# Patient Record
Sex: MALE | Race: WHITE | HISPANIC OR LATINO | URBAN - METROPOLITAN AREA
[De-identification: names, ages, dates, MRNs, and addresses within clinical notes are randomized per-mention and may not be internally consistent; named-entity substitution may affect disease eponyms.]

---

## 2017-10-23 ENCOUNTER — EMERGENCY (EMERGENCY)
Facility: HOSPITAL | Age: 82
LOS: 1 days | Discharge: HOME | End: 2017-10-23
Attending: EMERGENCY MEDICINE | Admitting: EMERGENCY MEDICINE
Payer: MEDICAID

## 2017-10-23 VITALS
HEART RATE: 89 BPM | SYSTOLIC BLOOD PRESSURE: 142 MMHG | DIASTOLIC BLOOD PRESSURE: 78 MMHG | RESPIRATION RATE: 18 BRPM | TEMPERATURE: 98 F | OXYGEN SATURATION: 99 %

## 2017-10-23 VITALS
WEIGHT: 139.99 LBS | TEMPERATURE: 98 F | HEIGHT: 68 IN | HEART RATE: 90 BPM | DIASTOLIC BLOOD PRESSURE: 76 MMHG | SYSTOLIC BLOOD PRESSURE: 145 MMHG | OXYGEN SATURATION: 100 % | RESPIRATION RATE: 16 BRPM

## 2017-10-23 DIAGNOSIS — S98.139A COMPLETE TRAUMATIC AMPUTATION OF ONE UNSPECIFIED LESSER TOE, INITIAL ENCOUNTER: Chronic | ICD-10-CM

## 2017-10-23 LAB
ALBUMIN SERPL ELPH-MCNC: 3.4 G/DL — SIGNIFICANT CHANGE UP (ref 3.3–5.2)
ALP SERPL-CCNC: 92 U/L — SIGNIFICANT CHANGE UP (ref 40–120)
ALT FLD-CCNC: 12 U/L — SIGNIFICANT CHANGE UP
ANION GAP SERPL CALC-SCNC: 13 MMOL/L — SIGNIFICANT CHANGE UP (ref 5–17)
AST SERPL-CCNC: 12 U/L — SIGNIFICANT CHANGE UP
BASOPHILS # BLD AUTO: 0 K/UL — SIGNIFICANT CHANGE UP (ref 0–0.2)
BASOPHILS NFR BLD AUTO: 0.1 % — SIGNIFICANT CHANGE UP (ref 0–2)
BILIRUB SERPL-MCNC: 0.1 MG/DL — LOW (ref 0.4–2)
BUN SERPL-MCNC: 27 MG/DL — HIGH (ref 8–20)
CALCIUM SERPL-MCNC: 8.4 MG/DL — LOW (ref 8.6–10.2)
CHLORIDE SERPL-SCNC: 101 MMOL/L — SIGNIFICANT CHANGE UP (ref 98–107)
CO2 SERPL-SCNC: 26 MMOL/L — SIGNIFICANT CHANGE UP (ref 22–29)
CREAT SERPL-MCNC: 2.1 MG/DL — HIGH (ref 0.5–1.3)
CRP SERPL-MCNC: 2.1 MG/DL — HIGH (ref 0–0.4)
EOSINOPHIL # BLD AUTO: 0.2 K/UL — SIGNIFICANT CHANGE UP (ref 0–0.5)
EOSINOPHIL NFR BLD AUTO: 1.7 % — SIGNIFICANT CHANGE UP (ref 0–5)
ERYTHROCYTE [SEDIMENTATION RATE] IN BLOOD: 56 MM/HR — HIGH (ref 0–20)
GLUCOSE SERPL-MCNC: 204 MG/DL — HIGH (ref 70–115)
HCT VFR BLD CALC: 30.5 % — LOW (ref 42–52)
HGB BLD-MCNC: 10 G/DL — LOW (ref 14–18)
LACTATE BLDV-MCNC: 2.1 MMOL/L — HIGH (ref 0.5–2)
LYMPHOCYTES # BLD AUTO: 16 % — LOW (ref 20–55)
LYMPHOCYTES # BLD AUTO: 2.4 K/UL — SIGNIFICANT CHANGE UP (ref 1–4.8)
MCHC RBC-ENTMCNC: 29.3 PG — SIGNIFICANT CHANGE UP (ref 27–31)
MCHC RBC-ENTMCNC: 32.8 G/DL — SIGNIFICANT CHANGE UP (ref 32–36)
MCV RBC AUTO: 89.4 FL — SIGNIFICANT CHANGE UP (ref 80–94)
MONOCYTES # BLD AUTO: 1 K/UL — HIGH (ref 0–0.8)
MONOCYTES NFR BLD AUTO: 6.8 % — SIGNIFICANT CHANGE UP (ref 3–10)
NEUTROPHILS # BLD AUTO: 11.1 K/UL — HIGH (ref 1.8–8)
NEUTROPHILS NFR BLD AUTO: 75.1 % — HIGH (ref 37–73)
PLATELET # BLD AUTO: 294 K/UL — SIGNIFICANT CHANGE UP (ref 150–400)
POTASSIUM SERPL-MCNC: 4.4 MMOL/L — SIGNIFICANT CHANGE UP (ref 3.5–5.3)
POTASSIUM SERPL-SCNC: 4.4 MMOL/L — SIGNIFICANT CHANGE UP (ref 3.5–5.3)
PROT SERPL-MCNC: 6.9 G/DL — SIGNIFICANT CHANGE UP (ref 6.6–8.7)
RBC # BLD: 3.41 M/UL — LOW (ref 4.6–6.2)
RBC # FLD: 17.1 % — HIGH (ref 11–15.6)
SODIUM SERPL-SCNC: 140 MMOL/L — SIGNIFICANT CHANGE UP (ref 135–145)
WBC # BLD: 14.8 K/UL — HIGH (ref 4.8–10.8)
WBC # FLD AUTO: 14.8 K/UL — HIGH (ref 4.8–10.8)

## 2017-10-23 PROCEDURE — 85652 RBC SED RATE AUTOMATED: CPT

## 2017-10-23 PROCEDURE — 73630 X-RAY EXAM OF FOOT: CPT

## 2017-10-23 PROCEDURE — 86140 C-REACTIVE PROTEIN: CPT

## 2017-10-23 PROCEDURE — 93925 LOWER EXTREMITY STUDY: CPT | Mod: 26

## 2017-10-23 PROCEDURE — 71046 X-RAY EXAM CHEST 2 VIEWS: CPT

## 2017-10-23 PROCEDURE — 83605 ASSAY OF LACTIC ACID: CPT

## 2017-10-23 PROCEDURE — 85027 COMPLETE CBC AUTOMATED: CPT

## 2017-10-23 PROCEDURE — 99285 EMERGENCY DEPT VISIT HI MDM: CPT

## 2017-10-23 PROCEDURE — 80053 COMPREHEN METABOLIC PANEL: CPT

## 2017-10-23 PROCEDURE — 99284 EMERGENCY DEPT VISIT MOD MDM: CPT | Mod: 25

## 2017-10-23 PROCEDURE — 73630 X-RAY EXAM OF FOOT: CPT | Mod: 26,LT

## 2017-10-23 PROCEDURE — T1013: CPT

## 2017-10-23 PROCEDURE — 36415 COLL VENOUS BLD VENIPUNCTURE: CPT

## 2017-10-23 PROCEDURE — 83690 ASSAY OF LIPASE: CPT

## 2017-10-23 PROCEDURE — 71020: CPT | Mod: 26

## 2017-10-23 PROCEDURE — 87040 BLOOD CULTURE FOR BACTERIA: CPT

## 2017-10-23 PROCEDURE — 93925 LOWER EXTREMITY STUDY: CPT

## 2017-10-23 RX ORDER — MUPIROCIN 20 MG/G
1 OINTMENT TOPICAL
Qty: 1 | Refills: 0 | OUTPATIENT
Start: 2017-10-23 | End: 2017-11-02

## 2017-10-23 RX ORDER — MUPIROCIN 20 MG/G
1 OINTMENT TOPICAL ONCE
Qty: 0 | Refills: 0 | Status: COMPLETED | OUTPATIENT
Start: 2017-10-23 | End: 2017-10-23

## 2017-10-23 RX ADMIN — MUPIROCIN 1 APPLICATION(S): 20 OINTMENT TOPICAL at 21:55

## 2017-10-23 NOTE — ED ADULT TRIAGE NOTE - CHIEF COMPLAINT QUOTE
Pt moved here from [facility] 8 days ago, and had the toes off the left foot removed.  Daughter states the wound has a "foul smell and looks very fresh"

## 2017-10-23 NOTE — ED STATDOCS - MEDICAL DECISION MAKING DETAILS
I, Reta Pate, performed the initial face to face bedside interview with this patient regarding history of present illness and determined that the patient should be seen in the main ED.  The history, was documented by the scribe in my presence and I attest to the accuracy of the documentation.

## 2017-10-23 NOTE — ED ADULT NURSE NOTE - CHIEF COMPLAINT QUOTE
Pt moved here from [facility] 8 days ago, and had the toes off the left foot removed.  Daughter states the wound has a "foul smell and looks very fresh"

## 2017-10-23 NOTE — ED ADULT NURSE NOTE - OBJECTIVE STATEMENT
Pt moved here from [place] 8 days ago, and had the all his toes amputated from his left foot.  Daughter stated that his wound has a "foul smell and looks very fresh".  pt is A/Ox3.

## 2017-10-23 NOTE — ED PROVIDER NOTE - OBJECTIVE STATEMENT
84 yo male hx of diabetes, BPH, and PVD; s/p mid-foot amputation approx 3 months ago while in [place]; healed well; recently moved to [place] to live with daughter; BIB daughter for concerns of 1 day of loose stools, and noted wound to left foot that's been draining clear liquid; +has some mild to moderate aching pain when pressure is applied while walking with walker; no fever; non radiating

## 2017-10-23 NOTE — ED STATDOCS - PROGRESS NOTE DETAILS
83 year old male, with hx of DM, HTN, and HLD, with daughter at bedside presenting to the ED for a wound check. Pt's daughter states that the pt had an amputation to his left foot approximately 2 months and 2 weeks ago. His daughter states that the pt moved from [place] to [place] 8 days ago. Pt's daughter also states that she noticed an ulcer to the amputated foot when the pt arrived from [place]. As per daughter, the pt has had a foul odor from the amputation site that she noticed yesterday. His daughter states that the pt referred to a nursing home, but did not want to go there. Pt denies having any nausea, vomiting, or pain to the area. Upon examination in the intake area, pt was found to have an ulceration to his lateral left foot with slight surrounding erythema. Will transfer to the main ED for further evaluation by another provider.

## 2017-10-23 NOTE — ED PROVIDER NOTE - PROGRESS NOTE DETAILS
no acute findings on labs or imaging today; OK for d/c; will send home with bactroban cream and outpt f/u with [facility] and podiatry

## 2017-10-23 NOTE — ED PROVIDER NOTE - SKIN, MLM
left foot with amputation at midfoot; lateral aspect of well-healed surgical wound with 1cm round ulceration with +granulation tissue

## 2017-10-28 LAB
CULTURE RESULTS: SIGNIFICANT CHANGE UP
CULTURE RESULTS: SIGNIFICANT CHANGE UP
SPECIMEN SOURCE: SIGNIFICANT CHANGE UP
SPECIMEN SOURCE: SIGNIFICANT CHANGE UP

## 2017-11-02 ENCOUNTER — INPATIENT (INPATIENT)
Facility: HOSPITAL | Age: 82
LOS: 13 days | Discharge: NURSING, REHAB OR LONG-TERM CARE | DRG: 638 | End: 2017-11-16
Attending: HOSPITALIST | Admitting: HOSPITALIST
Payer: MEDICAID

## 2017-11-02 VITALS — WEIGHT: 130.07 LBS | HEIGHT: 67 IN

## 2017-11-02 DIAGNOSIS — S98.139A COMPLETE TRAUMATIC AMPUTATION OF ONE UNSPECIFIED LESSER TOE, INITIAL ENCOUNTER: Chronic | ICD-10-CM

## 2017-11-02 LAB
ALBUMIN SERPL ELPH-MCNC: 3.2 G/DL — LOW (ref 3.3–5.2)
ALP SERPL-CCNC: 88 U/L — SIGNIFICANT CHANGE UP (ref 40–120)
ALT FLD-CCNC: 8 U/L — SIGNIFICANT CHANGE UP
ANION GAP SERPL CALC-SCNC: 14 MMOL/L — SIGNIFICANT CHANGE UP (ref 5–17)
APPEARANCE UR: CLEAR — SIGNIFICANT CHANGE UP
AST SERPL-CCNC: 14 U/L — SIGNIFICANT CHANGE UP
BASOPHILS # BLD AUTO: 0 K/UL — SIGNIFICANT CHANGE UP (ref 0–0.2)
BASOPHILS NFR BLD AUTO: 0.2 % — SIGNIFICANT CHANGE UP (ref 0–2)
BILIRUB SERPL-MCNC: 0.1 MG/DL — LOW (ref 0.4–2)
BILIRUB UR-MCNC: NEGATIVE — SIGNIFICANT CHANGE UP
BUN SERPL-MCNC: 37 MG/DL — HIGH (ref 8–20)
CALCIUM SERPL-MCNC: 8.9 MG/DL — SIGNIFICANT CHANGE UP (ref 8.6–10.2)
CHLORIDE SERPL-SCNC: 100 MMOL/L — SIGNIFICANT CHANGE UP (ref 98–107)
CO2 SERPL-SCNC: 21 MMOL/L — LOW (ref 22–29)
COLOR SPEC: SIGNIFICANT CHANGE UP
CREAT SERPL-MCNC: 2.5 MG/DL — HIGH (ref 0.5–1.3)
DIFF PNL FLD: ABNORMAL
EOSINOPHIL # BLD AUTO: 0.4 K/UL — SIGNIFICANT CHANGE UP (ref 0–0.5)
EOSINOPHIL NFR BLD AUTO: 4.2 % — SIGNIFICANT CHANGE UP (ref 0–5)
EPI CELLS # UR: SIGNIFICANT CHANGE UP
GLUCOSE SERPL-MCNC: 312 MG/DL — HIGH (ref 70–115)
GLUCOSE UR QL: 100 MG/DL
HCT VFR BLD CALC: 30.7 % — LOW (ref 42–52)
HGB BLD-MCNC: 10.1 G/DL — LOW (ref 14–18)
KETONES UR-MCNC: NEGATIVE — SIGNIFICANT CHANGE UP
LACTATE BLDV-MCNC: 1.2 MMOL/L — SIGNIFICANT CHANGE UP (ref 0.5–2)
LEUKOCYTE ESTERASE UR-ACNC: NEGATIVE — SIGNIFICANT CHANGE UP
LIDOCAIN IGE QN: 71 U/L — HIGH (ref 22–51)
LYMPHOCYTES # BLD AUTO: 2.2 K/UL — SIGNIFICANT CHANGE UP (ref 1–4.8)
LYMPHOCYTES # BLD AUTO: 25.1 % — SIGNIFICANT CHANGE UP (ref 20–55)
MCHC RBC-ENTMCNC: 29.1 PG — SIGNIFICANT CHANGE UP (ref 27–31)
MCHC RBC-ENTMCNC: 32.9 G/DL — SIGNIFICANT CHANGE UP (ref 32–36)
MCV RBC AUTO: 88.5 FL — SIGNIFICANT CHANGE UP (ref 80–94)
MONOCYTES # BLD AUTO: 0.8 K/UL — SIGNIFICANT CHANGE UP (ref 0–0.8)
MONOCYTES NFR BLD AUTO: 9.6 % — SIGNIFICANT CHANGE UP (ref 3–10)
NEUTROPHILS # BLD AUTO: 5.3 K/UL — SIGNIFICANT CHANGE UP (ref 1.8–8)
NEUTROPHILS NFR BLD AUTO: 60.6 % — SIGNIFICANT CHANGE UP (ref 37–73)
NITRITE UR-MCNC: NEGATIVE — SIGNIFICANT CHANGE UP
PH UR: 6.5 — SIGNIFICANT CHANGE UP (ref 5–8)
PLATELET # BLD AUTO: 335 K/UL — SIGNIFICANT CHANGE UP (ref 150–400)
POTASSIUM SERPL-MCNC: 4.8 MMOL/L — SIGNIFICANT CHANGE UP (ref 3.5–5.3)
POTASSIUM SERPL-SCNC: 4.8 MMOL/L — SIGNIFICANT CHANGE UP (ref 3.5–5.3)
PROT SERPL-MCNC: 7.2 G/DL — SIGNIFICANT CHANGE UP (ref 6.6–8.7)
PROT UR-MCNC: 500 MG/DL
RBC # BLD: 3.47 M/UL — LOW (ref 4.6–6.2)
RBC # FLD: 16.8 % — HIGH (ref 11–15.6)
RBC CASTS # UR COMP ASSIST: SIGNIFICANT CHANGE UP /HPF (ref 0–4)
SODIUM SERPL-SCNC: 135 MMOL/L — SIGNIFICANT CHANGE UP (ref 135–145)
SP GR SPEC: 1.01 — SIGNIFICANT CHANGE UP (ref 1.01–1.02)
TROPONIN T SERPL-MCNC: 0.03 NG/ML — SIGNIFICANT CHANGE UP (ref 0–0.06)
UROBILINOGEN FLD QL: NEGATIVE MG/DL — SIGNIFICANT CHANGE UP
WBC # BLD: 8.7 K/UL — SIGNIFICANT CHANGE UP (ref 4.8–10.8)
WBC # FLD AUTO: 8.7 K/UL — SIGNIFICANT CHANGE UP (ref 4.8–10.8)
WBC UR QL: SIGNIFICANT CHANGE UP

## 2017-11-02 PROCEDURE — 93010 ELECTROCARDIOGRAM REPORT: CPT

## 2017-11-02 PROCEDURE — 99285 EMERGENCY DEPT VISIT HI MDM: CPT

## 2017-11-02 PROCEDURE — 71010: CPT | Mod: 26

## 2017-11-02 PROCEDURE — 74176 CT ABD & PELVIS W/O CONTRAST: CPT | Mod: 26

## 2017-11-02 RX ORDER — FAMOTIDINE 10 MG/ML
20 INJECTION INTRAVENOUS ONCE
Qty: 0 | Refills: 0 | Status: COMPLETED | OUTPATIENT
Start: 2017-11-02 | End: 2017-11-02

## 2017-11-02 RX ADMIN — Medication 30 MILLILITER(S): at 22:27

## 2017-11-02 RX ADMIN — FAMOTIDINE 20 MILLIGRAM(S): 10 INJECTION INTRAVENOUS at 22:27

## 2017-11-02 NOTE — ED ADULT NURSE NOTE - OBJECTIVE STATEMENT
pt arrived in from [place], as per family pt has had abd pain with nausea for several month pt does not really have a doctor and is in from [place], has a history of diabetes pt arrived in from [place], as per family pt has had abd pain with nausea for several month pt does not really have a doctor and is in from [place], has a history of diabetes along with left foot and leg pain and left foot amputation,

## 2017-11-02 NOTE — ED PROVIDER NOTE - MUSCULOSKELETAL, MLM
Spine appears normal, range of motion is not limited, no muscle or joint tenderness. S/p amputation at left midfoot

## 2017-11-02 NOTE — CHART NOTE - NSCHARTNOTEFT_GEN_A_CORE
WILMER NOTE: WILMER requested to meet with pt and dtr Camilla for pt concerns. pt was a [place] resident until 2014 when he moved to [place] he had medicaid in [place] and then in [place]. In October pt returned to [place] to live with dtr Camilla (medicaid application filed)  and his medicaid is pending. pt has poor ambulation skills which have deteriorated further more recently (toe amputation in Sept. in [place]) pt has become weakened and bedbound. pt is unable to care for self and dtr is finding it difficult to manage as she herself is also disabled and uses a cane to ambulate. pt had been walking with RW s/p amputation but recent issues have kept pt in bed. pt dtr states pt has not been compliant with meds due to insurance issues. WILMER did discuss generic prescriptions with MD who agreed that was a partial solution. family would like help at home or some assistance with pt care. pt may require a PT consult for poss placement.  Discussed same with MD.

## 2017-11-02 NOTE — ED PROVIDER NOTE - MEDICAL DECISION MAKING DETAILS
Pt unstable on feet, with multiple falls. Unsafe for dischaerge as unabel to care for self and daughter, who is cancer pt with chonic back pain, cannot care for him either. SW to assist with placement

## 2017-11-02 NOTE — ED PROVIDER NOTE - OBJECTIVE STATEMENT
83 year old male with PMh DM, HTN, PVD s/p amputation who presents with abd pain and leg pain. Sx began yesterday as a cramping epigastric abd pain, constant, with associated diarrhea. no associated nausea, vomiting, fever, chills, chest pain, SOB, numbness, tinglign, weakness. pt also reports chronic pain to his left leg since his surgery, mildly worse than baseline.  Of note, the pt's daughter states that the pt was ambulatory up to 2 weeks ago, now bed bound, frequent falls, unable to care for self.

## 2017-11-03 DIAGNOSIS — R27.0 ATAXIA, UNSPECIFIED: ICD-10-CM

## 2017-11-03 DIAGNOSIS — R06.02 SHORTNESS OF BREATH: ICD-10-CM

## 2017-11-03 DIAGNOSIS — E11.9 TYPE 2 DIABETES MELLITUS WITHOUT COMPLICATIONS: ICD-10-CM

## 2017-11-03 DIAGNOSIS — I73.9 PERIPHERAL VASCULAR DISEASE, UNSPECIFIED: ICD-10-CM

## 2017-11-03 DIAGNOSIS — R29.6 REPEATED FALLS: ICD-10-CM

## 2017-11-03 DIAGNOSIS — N18.3 CHRONIC KIDNEY DISEASE, STAGE 3 (MODERATE): ICD-10-CM

## 2017-11-03 DIAGNOSIS — I10 ESSENTIAL (PRIMARY) HYPERTENSION: ICD-10-CM

## 2017-11-03 DIAGNOSIS — E11.8 TYPE 2 DIABETES MELLITUS WITH UNSPECIFIED COMPLICATIONS: ICD-10-CM

## 2017-11-03 DIAGNOSIS — L97.509 NON-PRESSURE CHRONIC ULCER OF OTHER PART OF UNSPECIFIED FOOT WITH UNSPECIFIED SEVERITY: ICD-10-CM

## 2017-11-03 LAB
GLUCOSE BLDC GLUCOMTR-MCNC: 162 MG/DL — HIGH (ref 70–99)
GLUCOSE BLDC GLUCOMTR-MCNC: 170 MG/DL — HIGH (ref 70–99)
GLUCOSE BLDC GLUCOMTR-MCNC: 196 MG/DL — HIGH (ref 70–99)
GLUCOSE BLDC GLUCOMTR-MCNC: 290 MG/DL — HIGH (ref 70–99)
T3 SERPL-MCNC: 86 NG/DL — SIGNIFICANT CHANGE UP (ref 80–200)
T4 AB SER-ACNC: 5.2 UG/DL — SIGNIFICANT CHANGE UP (ref 4.5–12)
TSH SERPL-MCNC: 0.42 UIU/ML — SIGNIFICANT CHANGE UP (ref 0.27–4.2)

## 2017-11-03 PROCEDURE — 99223 1ST HOSP IP/OBS HIGH 75: CPT

## 2017-11-03 PROCEDURE — 73620 X-RAY EXAM OF FOOT: CPT | Mod: 26,50

## 2017-11-03 RX ORDER — ACETAMINOPHEN 500 MG
650 TABLET ORAL EVERY 6 HOURS
Qty: 0 | Refills: 0 | Status: DISCONTINUED | OUTPATIENT
Start: 2017-11-03 | End: 2017-11-16

## 2017-11-03 RX ORDER — METOPROLOL TARTRATE 50 MG
25 TABLET ORAL
Qty: 0 | Refills: 0 | Status: DISCONTINUED | OUTPATIENT
Start: 2017-11-03 | End: 2017-11-04

## 2017-11-03 RX ORDER — INSULIN LISPRO 100/ML
VIAL (ML) SUBCUTANEOUS
Qty: 0 | Refills: 0 | Status: DISCONTINUED | OUTPATIENT
Start: 2017-11-03 | End: 2017-11-16

## 2017-11-03 RX ORDER — DEXTROSE 50 % IN WATER 50 %
25 SYRINGE (ML) INTRAVENOUS ONCE
Qty: 0 | Refills: 0 | Status: DISCONTINUED | OUTPATIENT
Start: 2017-11-03 | End: 2017-11-16

## 2017-11-03 RX ORDER — ASPIRIN/CALCIUM CARB/MAGNESIUM 324 MG
81 TABLET ORAL DAILY
Qty: 0 | Refills: 0 | Status: DISCONTINUED | OUTPATIENT
Start: 2017-11-03 | End: 2017-11-04

## 2017-11-03 RX ORDER — DEXTROSE 50 % IN WATER 50 %
1 SYRINGE (ML) INTRAVENOUS ONCE
Qty: 0 | Refills: 0 | Status: DISCONTINUED | OUTPATIENT
Start: 2017-11-03 | End: 2017-11-16

## 2017-11-03 RX ORDER — INSULIN GLARGINE 100 [IU]/ML
10 INJECTION, SOLUTION SUBCUTANEOUS EVERY MORNING
Qty: 0 | Refills: 0 | Status: DISCONTINUED | OUTPATIENT
Start: 2017-11-03 | End: 2017-11-07

## 2017-11-03 RX ORDER — ATORVASTATIN CALCIUM 80 MG/1
10 TABLET, FILM COATED ORAL AT BEDTIME
Qty: 0 | Refills: 0 | Status: DISCONTINUED | OUTPATIENT
Start: 2017-11-03 | End: 2017-11-04

## 2017-11-03 RX ORDER — TAMSULOSIN HYDROCHLORIDE 0.4 MG/1
0.4 CAPSULE ORAL AT BEDTIME
Qty: 0 | Refills: 0 | Status: DISCONTINUED | OUTPATIENT
Start: 2017-11-03 | End: 2017-11-16

## 2017-11-03 RX ORDER — AMLODIPINE BESYLATE 2.5 MG/1
5 TABLET ORAL DAILY
Qty: 0 | Refills: 0 | Status: DISCONTINUED | OUTPATIENT
Start: 2017-11-03 | End: 2017-11-03

## 2017-11-03 RX ORDER — SODIUM CHLORIDE 9 MG/ML
1000 INJECTION, SOLUTION INTRAVENOUS
Qty: 0 | Refills: 0 | Status: DISCONTINUED | OUTPATIENT
Start: 2017-11-03 | End: 2017-11-16

## 2017-11-03 RX ORDER — AMLODIPINE BESYLATE 2.5 MG/1
10 TABLET ORAL DAILY
Qty: 0 | Refills: 0 | Status: DISCONTINUED | OUTPATIENT
Start: 2017-11-03 | End: 2017-11-16

## 2017-11-03 RX ORDER — ENOXAPARIN SODIUM 100 MG/ML
30 INJECTION SUBCUTANEOUS DAILY
Qty: 0 | Refills: 0 | Status: DISCONTINUED | OUTPATIENT
Start: 2017-11-03 | End: 2017-11-16

## 2017-11-03 RX ORDER — DEXTROSE 50 % IN WATER 50 %
12.5 SYRINGE (ML) INTRAVENOUS ONCE
Qty: 0 | Refills: 0 | Status: DISCONTINUED | OUTPATIENT
Start: 2017-11-03 | End: 2017-11-16

## 2017-11-03 RX ORDER — HYDRALAZINE HCL 50 MG
10 TABLET ORAL EVERY 4 HOURS
Qty: 0 | Refills: 0 | Status: DISCONTINUED | OUTPATIENT
Start: 2017-11-03 | End: 2017-11-16

## 2017-11-03 RX ORDER — GLUCAGON INJECTION, SOLUTION 0.5 MG/.1ML
1 INJECTION, SOLUTION SUBCUTANEOUS ONCE
Qty: 0 | Refills: 0 | Status: DISCONTINUED | OUTPATIENT
Start: 2017-11-03 | End: 2017-11-16

## 2017-11-03 RX ORDER — PANTOPRAZOLE SODIUM 20 MG/1
40 TABLET, DELAYED RELEASE ORAL
Qty: 0 | Refills: 0 | Status: DISCONTINUED | OUTPATIENT
Start: 2017-11-03 | End: 2017-11-16

## 2017-11-03 RX ADMIN — Medication 3: at 12:54

## 2017-11-03 RX ADMIN — TAMSULOSIN HYDROCHLORIDE 0.4 MILLIGRAM(S): 0.4 CAPSULE ORAL at 22:30

## 2017-11-03 RX ADMIN — INSULIN GLARGINE 10 UNIT(S): 100 INJECTION, SOLUTION SUBCUTANEOUS at 08:01

## 2017-11-03 RX ADMIN — Medication 81 MILLIGRAM(S): at 11:39

## 2017-11-03 RX ADMIN — ENOXAPARIN SODIUM 30 MILLIGRAM(S): 100 INJECTION SUBCUTANEOUS at 22:30

## 2017-11-03 RX ADMIN — Medication 25 MILLIGRAM(S): at 06:08

## 2017-11-03 RX ADMIN — Medication 10 MILLIGRAM(S): at 22:32

## 2017-11-03 RX ADMIN — ATORVASTATIN CALCIUM 10 MILLIGRAM(S): 80 TABLET, FILM COATED ORAL at 22:30

## 2017-11-03 RX ADMIN — Medication 25 MILLIGRAM(S): at 17:08

## 2017-11-03 RX ADMIN — AMLODIPINE BESYLATE 5 MILLIGRAM(S): 2.5 TABLET ORAL at 06:08

## 2017-11-03 RX ADMIN — Medication 1: at 17:08

## 2017-11-03 RX ADMIN — Medication 1: at 08:01

## 2017-11-03 RX ADMIN — PANTOPRAZOLE SODIUM 40 MILLIGRAM(S): 20 TABLET, DELAYED RELEASE ORAL at 06:08

## 2017-11-03 NOTE — PROGRESS NOTE ADULT - SUBJECTIVE AND OBJECTIVE BOX
HPI: 82 y/o male with h/o DM II, HTN, PVD, CRI, pacemaker  who was recently moved from [place], started to have generalized abdominal pain and diarrhea 1 day ago. no fever. no nausea or vomiting. no blood in the stools. symptoms resolved spontaneously today. patient also c/o SOB on walking. as per ER notes, Daughter added that the patient is bed for the last 2 weeks feeling weak and had multiple falls. Labs shows Glu: 312 mg/dl (2017 01:46)    Interval: Pt seen and examined by me in ERHR. No acute overnight events as per nursing staff. Pt is in no acute distress at time of exam, sitting up in stretcher. Pt interviewed through interpeter. Pt is mildly confused but answers most questions appropriately. Only complaint offered is pain of left foot. Pt has a midfoot amputation w v  d     REVIEW OF SYSTEMS:    Patient admits left foot pain. Pt denies fever, chills, abdominal pain, nausea, vomiting, cough, shortness of breath, chest pain or palpitations    Vital Signs Last 24 Hrs  T(C): 37.2 (2017 07:13), Max: 37.2 (2017 07:13)  T(F): 98.9 (2017 07:13), Max: 98.9 (2017 07:13)  HR: 62 (2017 08:02) (60 - 73)  BP: 147/87 (2017 08:02) (147/87 - 179/78)  BP(mean): --  RR: 20 (2017 07:13) (18 - 20)  SpO2: 98% (2017 07:13) (98% - 100%)I&O's Summary  CAPILLARY BLOOD GLUCOSE    PHYSICAL EXAM:  GENERAL: NAD, well-groomed  HEENT: PERRL, +EOMI, anicteric  NECK: Supple, No JVD   CHEST/LUNG: CTA bilaterally; Normal effort  HEART: S1S2 Normal intensity, no murmurs, gallops or rubs noted  ABDOMEN: Soft, BS Normoactive, NT, ND, no HSM noted  EXTREMITIES:  left mid foot amputation, surgical incision healed, 1-2 cm diameter round ulcer to lateral proximal foot, covered eschcar. Right foot noted w medial great toe dry ulcer 1 cm diameter. LLE noted w longitudinal scar from bypass surgery, well healed.  2+ radial and 1+ DP pulses noted, no clubbing, cyanosis, or edema noted, FROM x 4.   SKIN: No rashes or lesions noted  NEURO: A&Ox3, no focal deficits noted, CN II-XII intact  PSYCH: normal mood and affect; insight/judgement appropriate  LABS:                        10.1   8.7   )-----------( 335      ( 2017 17:15 )             30.7         135  |  100  |  37.0<H>  ----------------------------<  312<H>  4.8   |  21.0<L>  |  2.50<H>    Ca    8.9      2017 17:15    TPro  7.2  /  Alb  3.2<L>  /  TBili  0.1<L>  /  DBili  x   /  AST  14  /  ALT  8   /  AlkPhos  88        Urinalysis Basic - ( 2017 18:38 )    Color: Pale Yellow / Appearance: Clear / S.010 / pH: x  Gluc: x / Ketone: Negative  / Bili: Negative / Urobili: Negative mg/dL   Blood: x / Protein: 500 mg/dL / Nitrite: Negative   Leuk Esterase: Negative / RBC: 0-2 /HPF / WBC 0-2   Sq Epi: x / Non Sq Epi: Occasional / Bacteria: x      RADIOLOGY & ADDITIONAL TESTS:  CT abd/pel 17  IMPRESSION:  No acute abdomen or pelvic pathology. Additional findings above.    CXR 17  Impression: Interstitial pulmonary edema. Pacemaker . Findings compatible   with left ventricular failure. Exclude interstitial pneumonia              MEDICATIONS:  MEDICATIONS  (STANDING):  amLODIPine   Tablet 10 milliGRAM(s) Oral daily  aspirin  chewable 81 milliGRAM(s) Oral daily  atorvastatin 10 milliGRAM(s) Oral at bedtime  dextrose 5%. 1000 milliLiter(s) (50 mL/Hr) IV Continuous <Continuous>  dextrose 50% Injectable 12.5 Gram(s) IV Push once  dextrose 50% Injectable 25 Gram(s) IV Push once  dextrose 50% Injectable 25 Gram(s) IV Push once  enoxaparin Injectable 30 milliGRAM(s) SubCutaneous daily  insulin glargine Injectable (LANTUS) 10 Unit(s) SubCutaneous every morning  insulin lispro (HumaLOG) corrective regimen sliding scale   SubCutaneous three times a day before meals  metoprolol     tartrate 25 milliGRAM(s) Oral two times a day  pantoprazole    Tablet 40 milliGRAM(s) Oral before breakfast  tamsulosin 0.4 milliGRAM(s) Oral at bedtime    MEDICATIONS  (PRN):  dextrose Gel 1 Dose(s) Oral once PRN Blood Glucose LESS THAN 70 milliGRAM(s)/deciliter  glucagon  Injectable 1 milliGRAM(s) IntraMuscular once PRN Glucose LESS THAN 70 milligrams/deciliter HPI: 82 y/o male with h/o DM II, HTN, PVD, CRI, pacemaker  who was recently moved from [place], started to have generalized abdominal pain and diarrhea 1 day ago. no fever. no nausea or vomiting. no blood in the stools. symptoms resolved spontaneously today. patient also c/o SOB on walking. as per ER notes, Daughter added that the patient is bed for the last 2 weeks feeling weak and had multiple falls. Labs shows Glu: 312 mg/dl (2017 01:46)    Interval: Pt seen and examined by me in ERHR. No acute overnight events as per nursing staff. Pt is in no acute distress at time of exam, sitting up in stretcher. Pt interviewed through  Pt is mildly confused but answers most questions appropriately. Only complaint offered is pain of left foot. Pt had a midfoot amputation four weeks ago in [place], recently moved to [place].     REVIEW OF SYSTEMS:    Patient admits left foot pain. Pt denies fever, chills, abdominal pain, nausea, vomiting, cough, shortness of breath, chest pain or palpitations    Vital Signs Last 24 Hrs  T(C): 37.2 (2017 07:13), Max: 37.2 (2017 07:13)  T(F): 98.9 (2017 07:13), Max: 98.9 (2017 07:13)  HR: 62 (2017 08:02) (60 - 73)  BP: 147/87 (2017 08:02) (147/87 - 179/78)  BP(mean): --  RR: 20 (2017 07:13) (18 - 20)  SpO2: 98% (2017 07:13) (98% - 100%)I&O's Summary  CAPILLARY BLOOD GLUCOSE    PHYSICAL EXAM:  GENERAL: NAD, well-groomed  HEENT: PERRL, +EOMI, anicteric  NECK: Supple, No JVD   CHEST/LUNG: CTA bilaterally; Normal effort  HEART: S1S2 Normal intensity, no murmurs, gallops or rubs noted  ABDOMEN: Soft, BS Normoactive, NT, ND, no HSM noted  EXTREMITIES:  left mid foot amputation, surgical incision healed, 1-2 cm diameter round ulcer to lateral proximal foot, covered eschcar. Right foot noted w medial great toe dry ulcer 1 cm diameter. LLE noted w longitudinal scar from bypass surgery, well healed.  2+ radial and 1+ DP pulses noted, no clubbing, cyanosis, or edema noted, FROM x 4.   SKIN: No rashes or lesions noted  NEURO: A&Ox3, no focal deficits noted, CN II-XII intact  PSYCH: normal mood and affect; insight/judgement appropriate  LABS:                        10.1   8.7   )-----------( 335      ( 2017 17:15 )             30.7         135  |  100  |  37.0<H>  ----------------------------<  312<H>  4.8   |  21.0<L>  |  2.50<H>    Ca    8.9      2017 17:15    TPro  7.2  /  Alb  3.2<L>  /  TBili  0.1<L>  /  DBili  x   /  AST  14  /  ALT  8   /  AlkPhos  88        Urinalysis Basic - ( 2017 18:38 )    Color: Pale Yellow / Appearance: Clear / S.010 / pH: x  Gluc: x / Ketone: Negative  / Bili: Negative / Urobili: Negative mg/dL   Blood: x / Protein: 500 mg/dL / Nitrite: Negative   Leuk Esterase: Negative / RBC: 0-2 /HPF / WBC 0-2   Sq Epi: x / Non Sq Epi: Occasional / Bacteria: x      RADIOLOGY & ADDITIONAL TESTS:  CT abd/pel 17  IMPRESSION:  No acute abdomen or pelvic pathology. Additional findings above.    CXR 17  Impression: Interstitial pulmonary edema. Pacemaker . Findings compatible   with left ventricular failure. Exclude interstitial pneumonia              MEDICATIONS:  MEDICATIONS  (STANDING):  amLODIPine   Tablet 10 milliGRAM(s) Oral daily  aspirin  chewable 81 milliGRAM(s) Oral daily  atorvastatin 10 milliGRAM(s) Oral at bedtime  dextrose 5%. 1000 milliLiter(s) (50 mL/Hr) IV Continuous <Continuous>  dextrose 50% Injectable 12.5 Gram(s) IV Push once  dextrose 50% Injectable 25 Gram(s) IV Push once  dextrose 50% Injectable 25 Gram(s) IV Push once  enoxaparin Injectable 30 milliGRAM(s) SubCutaneous daily  insulin glargine Injectable (LANTUS) 10 Unit(s) SubCutaneous every morning  insulin lispro (HumaLOG) corrective regimen sliding scale   SubCutaneous three times a day before meals  metoprolol     tartrate 25 milliGRAM(s) Oral two times a day  pantoprazole    Tablet 40 milliGRAM(s) Oral before breakfast  tamsulosin 0.4 milliGRAM(s) Oral at bedtime    MEDICATIONS  (PRN):  dextrose Gel 1 Dose(s) Oral once PRN Blood Glucose LESS THAN 70 milliGRAM(s)/deciliter  glucagon  Injectable 1 milliGRAM(s) IntraMuscular once PRN Glucose LESS THAN 70 milligrams/deciliter

## 2017-11-03 NOTE — PATIENT PROFILE ADULT. - FALL HARM RISK
coagulation(Bleeding disorder R/T clinical cond/anti-coags)/other/age(85 years old or older)/weakness hx od falls

## 2017-11-03 NOTE — H&P ADULT - PMH
CRI (chronic renal insufficiency), stage 3 (moderate)    Diabetes    HTN (hypertension)    Pacemaker    PVD (peripheral vascular disease)

## 2017-11-03 NOTE — H&P ADULT - HISTORY OF PRESENT ILLNESS
82 y/o male with h/o DM II, HTN, PVD, CRI, pacemaker  who was recently moved from [place], started to have generalized abdominal pain and diarrhea 1 day ago. no fever. no nausea or vomiting. no blood in the stools. symptoms resolved spontaneously today. patient also c/o SOB on walking. as per ER notes, Daughter added that the patient is bed for the last 2 weeks feeling weak and had multiple falls. Labs shows Glu: 312 mg/dl

## 2017-11-03 NOTE — CONSULT NOTE ADULT - ATTENDING COMMENTS
Imaging reviewed. Patient examine at bedside. Palpable pulses. Nonhealing ulcers likely related to uncontrolled diabetes.    Continue local wound care  Consult podiatry  No revascularization needed at this time

## 2017-11-04 LAB
ANION GAP SERPL CALC-SCNC: 12 MMOL/L — SIGNIFICANT CHANGE UP (ref 5–17)
BUN SERPL-MCNC: 36 MG/DL — HIGH (ref 8–20)
CALCIUM SERPL-MCNC: 8.2 MG/DL — LOW (ref 8.6–10.2)
CHLORIDE SERPL-SCNC: 103 MMOL/L — SIGNIFICANT CHANGE UP (ref 98–107)
CHOLEST SERPL-MCNC: 211 MG/DL — HIGH (ref 110–199)
CO2 SERPL-SCNC: 21 MMOL/L — LOW (ref 22–29)
CREAT SERPL-MCNC: 2.29 MG/DL — HIGH (ref 0.5–1.3)
GLUCOSE BLDC GLUCOMTR-MCNC: 158 MG/DL — HIGH (ref 70–99)
GLUCOSE BLDC GLUCOMTR-MCNC: 172 MG/DL — HIGH (ref 70–99)
GLUCOSE BLDC GLUCOMTR-MCNC: 276 MG/DL — HIGH (ref 70–99)
GLUCOSE BLDC GLUCOMTR-MCNC: 287 MG/DL — HIGH (ref 70–99)
GLUCOSE SERPL-MCNC: 164 MG/DL — HIGH (ref 70–115)
HBA1C BLD-MCNC: 7 % — HIGH (ref 4–5.6)
HCT VFR BLD CALC: 29.9 % — LOW (ref 42–52)
HDLC SERPL-MCNC: 31 MG/DL — LOW
HGB BLD-MCNC: 9.7 G/DL — LOW (ref 14–18)
LIPID PNL WITH DIRECT LDL SERPL: 144 MG/DL — SIGNIFICANT CHANGE UP
MAGNESIUM SERPL-MCNC: 1.5 MG/DL — LOW (ref 1.6–2.6)
MCHC RBC-ENTMCNC: 29.3 PG — SIGNIFICANT CHANGE UP (ref 27–31)
MCHC RBC-ENTMCNC: 32.4 G/DL — SIGNIFICANT CHANGE UP (ref 32–36)
MCV RBC AUTO: 90.3 FL — SIGNIFICANT CHANGE UP (ref 80–94)
PLATELET # BLD AUTO: 302 K/UL — SIGNIFICANT CHANGE UP (ref 150–400)
POTASSIUM SERPL-MCNC: 3.8 MMOL/L — SIGNIFICANT CHANGE UP (ref 3.5–5.3)
POTASSIUM SERPL-SCNC: 3.8 MMOL/L — SIGNIFICANT CHANGE UP (ref 3.5–5.3)
RBC # BLD: 3.31 M/UL — LOW (ref 4.6–6.2)
RBC # FLD: 16.2 % — HIGH (ref 11–15.6)
SODIUM SERPL-SCNC: 136 MMOL/L — SIGNIFICANT CHANGE UP (ref 135–145)
TOTAL CHOLESTEROL/HDL RATIO MEASUREMENT: 7 RATIO — SIGNIFICANT CHANGE UP (ref 3.4–9.6)
TRIGL SERPL-MCNC: 178 MG/DL — SIGNIFICANT CHANGE UP (ref 10–200)
WBC # BLD: 9.3 K/UL — SIGNIFICANT CHANGE UP (ref 4.8–10.8)
WBC # FLD AUTO: 9.3 K/UL — SIGNIFICANT CHANGE UP (ref 4.8–10.8)

## 2017-11-04 PROCEDURE — 99233 SBSQ HOSP IP/OBS HIGH 50: CPT

## 2017-11-04 RX ORDER — MAGNESIUM SULFATE 500 MG/ML
2 VIAL (ML) INJECTION ONCE
Qty: 0 | Refills: 0 | Status: COMPLETED | OUTPATIENT
Start: 2017-11-04 | End: 2017-11-04

## 2017-11-04 RX ORDER — ATORVASTATIN CALCIUM 80 MG/1
40 TABLET, FILM COATED ORAL AT BEDTIME
Qty: 0 | Refills: 0 | Status: DISCONTINUED | OUTPATIENT
Start: 2017-11-04 | End: 2017-11-16

## 2017-11-04 RX ORDER — ASPIRIN/CALCIUM CARB/MAGNESIUM 324 MG
325 TABLET ORAL DAILY
Qty: 0 | Refills: 0 | Status: DISCONTINUED | OUTPATIENT
Start: 2017-11-04 | End: 2017-11-16

## 2017-11-04 RX ORDER — METOPROLOL TARTRATE 50 MG
50 TABLET ORAL
Qty: 0 | Refills: 0 | Status: DISCONTINUED | OUTPATIENT
Start: 2017-11-04 | End: 2017-11-16

## 2017-11-04 RX ADMIN — Medication 650 MILLIGRAM(S): at 06:36

## 2017-11-04 RX ADMIN — ATORVASTATIN CALCIUM 40 MILLIGRAM(S): 80 TABLET, FILM COATED ORAL at 21:45

## 2017-11-04 RX ADMIN — Medication 50 GRAM(S): at 17:40

## 2017-11-04 RX ADMIN — PANTOPRAZOLE SODIUM 40 MILLIGRAM(S): 20 TABLET, DELAYED RELEASE ORAL at 06:06

## 2017-11-04 RX ADMIN — ENOXAPARIN SODIUM 30 MILLIGRAM(S): 100 INJECTION SUBCUTANEOUS at 21:46

## 2017-11-04 RX ADMIN — Medication 1: at 09:02

## 2017-11-04 RX ADMIN — INSULIN GLARGINE 10 UNIT(S): 100 INJECTION, SOLUTION SUBCUTANEOUS at 09:02

## 2017-11-04 RX ADMIN — Medication 325 MILLIGRAM(S): at 17:40

## 2017-11-04 RX ADMIN — Medication 50 MILLIGRAM(S): at 17:41

## 2017-11-04 RX ADMIN — Medication 25 MILLIGRAM(S): at 06:06

## 2017-11-04 RX ADMIN — Medication 650 MILLIGRAM(S): at 06:06

## 2017-11-04 RX ADMIN — Medication 1: at 17:41

## 2017-11-04 RX ADMIN — Medication 3: at 12:47

## 2017-11-04 RX ADMIN — TAMSULOSIN HYDROCHLORIDE 0.4 MILLIGRAM(S): 0.4 CAPSULE ORAL at 21:45

## 2017-11-04 RX ADMIN — AMLODIPINE BESYLATE 10 MILLIGRAM(S): 2.5 TABLET ORAL at 06:06

## 2017-11-04 NOTE — PROGRESS NOTE ADULT - SUBJECTIVE AND OBJECTIVE BOX
JUAN MIGUEL PARADA    [id number]    83y      Male    INTERVAL HPI/OVERNIGHT EVENTS:    patient being seen for abd pain, ckd, dm2 and pvd. Patient seen at bedside and complains of cough.     REVIEW OF SYSTEMS:    CONSTITUTIONAL: No fever, weight loss, or fatigue  RESPIRATORY: No cough, wheezing, hemoptysis; No shortness of breath  CARDIOVASCULAR: No chest pain, palpitations  GASTROINTESTINAL: No abdominal or epigastric pain. No nausea, vomiting  NEUROLOGICAL: No headaches, memory loss, loss of strength.  MISCELLANEOUS:      Vital Signs Last 24 Hrs  T(C): 37.3 (2017 06:05), Max: 37.3 (2017 06:05)  T(F): 99.2 (2017 06:05), Max: 99.2 (2017 06:05)  HR: 62 (2017 06:05) (60 - 62)  BP: 152/60 (2017 06:05) (152/60 - 164/66)  BP(mean): --  RR: 16 (2017 06:05) (16 - 16)  SpO2: --    PHYSICAL EXAM:    GENERAL: NAD, well-groomed  HEENT: PERRL, +EOMI  NECK: soft, Supple, No JVD,   CHEST/LUNG: Clear to auscultation bilaterally; No wheezing  HEART: S1S2+, Regular rate and rhythm; No murmurs, rubs, or gallops  ABDOMEN: Soft, Nontender, Nondistended; Bowel sounds present  EXTREMITIES:  2+ Peripheral Pulses, No clubbing, cyanosis, or edema  SKIN: No rashes or lesions  NEURO: AAOX3, no focal deficits, no motor r sensory loss  PSYCH: normal mood      LABS:                        9.7    9.3   )-----------( 302      ( 2017 07:11 )             29.9     11-04    136  |  103  |  36.0<H>  ----------------------------<  164<H>  3.8   |  21.0<L>  |  2.29<H>    Ca    8.2<L>      2017 07:11  Mg     1.5     11-04    TPro  7.2  /  Alb  3.2<L>  /  TBili  0.1<L>  /  DBili  x   /  AST  14  /  ALT  8   /  AlkPhos  88  11-02      Urinalysis Basic - ( 2017 18:38 )    Color: Pale Yellow / Appearance: Clear / S.010 / pH: x  Gluc: x / Ketone: Negative  / Bili: Negative / Urobili: Negative mg/dL   Blood: x / Protein: 500 mg/dL / Nitrite: Negative   Leuk Esterase: Negative / RBC: 0-2 /HPF / WBC 0-2   Sq Epi: x / Non Sq Epi: Occasional / Bacteria: x          MEDICATIONS  (STANDING):  amLODIPine   Tablet 10 milliGRAM(s) Oral daily  aspirin 325 milliGRAM(s) Oral daily  atorvastatin 40 milliGRAM(s) Oral at bedtime  dextrose 5%. 1000 milliLiter(s) (50 mL/Hr) IV Continuous <Continuous>  dextrose 50% Injectable 12.5 Gram(s) IV Push once  dextrose 50% Injectable 25 Gram(s) IV Push once  dextrose 50% Injectable 25 Gram(s) IV Push once  enoxaparin Injectable 30 milliGRAM(s) SubCutaneous daily  HYDROcodone/homatropine Syrup 5 milliLiter(s) Oral three times a day  insulin glargine Injectable (LANTUS) 10 Unit(s) SubCutaneous every morning  insulin lispro (HumaLOG) corrective regimen sliding scale   SubCutaneous three times a day before meals  magnesium sulfate  IVPB 2 Gram(s) IV Intermittent once  metoprolol     tartrate 50 milliGRAM(s) Oral two times a day  pantoprazole    Tablet 40 milliGRAM(s) Oral before breakfast  tamsulosin 0.4 milliGRAM(s) Oral at bedtime    MEDICATIONS  (PRN):  acetaminophen   Tablet. 650 milliGRAM(s) Oral every 6 hours PRN Mild Pain (1 - 3)  dextrose Gel 1 Dose(s) Oral once PRN Blood Glucose LESS THAN 70 milliGRAM(s)/deciliter  glucagon  Injectable 1 milliGRAM(s) IntraMuscular once PRN Glucose LESS THAN 70 milligrams/deciliter  hydrALAZINE Injectable 10 milliGRAM(s) IV Push every 4 hours PRN forsbp above 160 mmhg      RADIOLOGY & ADDITIONAL TESTS: JUAN MIGUEL PARADA    [id number]    83y      Male    INTERVAL HPI/OVERNIGHT EVENTS:    patient being seen for abd pain, ckd, dm2 and pvd. Patient seen at bedside and complains of cough.     REVIEW OF SYSTEMS:    CONSTITUTIONAL: No fever, weight loss, or fatigue  RESPIRATORY: cough   CARDIOVASCULAR: No chest pain, palpitations  GASTROINTESTINAL: No abdominal or epigastric pain. No nausea, vomiting  NEUROLOGICAL: No headaches, memory loss, loss of strength.  MISCELLANEOUS:      Vital Signs Last 24 Hrs  T(C): 37.3 (2017 06:05), Max: 37.3 (2017 06:05)  T(F): 99.2 (2017 06:05), Max: 99.2 (2017 06:05)  HR: 62 (2017 06:05) (60 - 62)  BP: 152/60 (2017 06:05) (152/60 - 164/66)  BP(mean): --  RR: 16 (2017 06:05) (16 - 16)  SpO2: --    PHYSICAL EXAM:    GENERAL: NAD, pleasant  HEENT: PERRL, +EOMI  NECK: soft, Supple, No JVD,   CHEST/LUNG: Clear to auscultation bilaterally; No wheezing  HEART: S1S2+, Regular rate and rhythm; No murmurs  ABDOMEN: Soft, Nontender,  EXTREMITIES:  left tma, multiple old scars from prior surgeries  NEURO: AAOX3,   PSYCH: normal mood      LABS:                        9.7    9.3   )-----------( 302      ( 2017 07:11 )             29.9     11-04    136  |  103  |  36.0<H>  ----------------------------<  164<H>  3.8   |  21.0<L>  |  2.29<H>    Ca    8.2<L>      2017 07:11  Mg     1.5     11-04    TPro  7.2  /  Alb  3.2<L>  /  TBili  0.1<L>  /  DBili  x   /  AST  14  /  ALT  8   /  AlkPhos  88  11-02      Urinalysis Basic - ( 2017 18:38 )    Color: Pale Yellow / Appearance: Clear / S.010 / pH: x  Gluc: x / Ketone: Negative  / Bili: Negative / Urobili: Negative mg/dL   Blood: x / Protein: 500 mg/dL / Nitrite: Negative   Leuk Esterase: Negative / RBC: 0-2 /HPF / WBC 0-2   Sq Epi: x / Non Sq Epi: Occasional / Bacteria: x          MEDICATIONS  (STANDING):  amLODIPine   Tablet 10 milliGRAM(s) Oral daily  aspirin 325 milliGRAM(s) Oral daily  atorvastatin 40 milliGRAM(s) Oral at bedtime  dextrose 5%. 1000 milliLiter(s) (50 mL/Hr) IV Continuous <Continuous>  dextrose 50% Injectable 12.5 Gram(s) IV Push once  dextrose 50% Injectable 25 Gram(s) IV Push once  dextrose 50% Injectable 25 Gram(s) IV Push once  enoxaparin Injectable 30 milliGRAM(s) SubCutaneous daily  HYDROcodone/homatropine Syrup 5 milliLiter(s) Oral three times a day  insulin glargine Injectable (LANTUS) 10 Unit(s) SubCutaneous every morning  insulin lispro (HumaLOG) corrective regimen sliding scale   SubCutaneous three times a day before meals  magnesium sulfate  IVPB 2 Gram(s) IV Intermittent once  metoprolol     tartrate 50 milliGRAM(s) Oral two times a day  pantoprazole    Tablet 40 milliGRAM(s) Oral before breakfast  tamsulosin 0.4 milliGRAM(s) Oral at bedtime    MEDICATIONS  (PRN):  acetaminophen   Tablet. 650 milliGRAM(s) Oral every 6 hours PRN Mild Pain (1 - 3)  dextrose Gel 1 Dose(s) Oral once PRN Blood Glucose LESS THAN 70 milliGRAM(s)/deciliter  glucagon  Injectable 1 milliGRAM(s) IntraMuscular once PRN Glucose LESS THAN 70 milligrams/deciliter  hydrALAZINE Injectable 10 milliGRAM(s) IV Push every 4 hours PRN forsbp above 160 mmhg      RADIOLOGY & ADDITIONAL TESTS:

## 2017-11-04 NOTE — PROGRESS NOTE ADULT - ASSESSMENT
84 y/o male with uncontrolled DM, HTN, CRI, PVD, pacemaker, multiple falls admitted for abd pain      Problem/Plan - 1:  ·  Problem: Multiple falls.   --> pt consult   --> may  need sandy     Problem/Plan - 2:  ·  Problem: Type 2 diabetes mellitus with complication, without long-term current use of insulin.  Plan: insulin therapy protocol.   --> a1c 7.0     Problem/Plan - 3:  ·  Problem: Essential hypertension.  Plan: Amlodipine and metoprolol.  --> inc metoprolol       Problem/Plan - 4:  ·  Problem: CRI (chronic renal insufficiency), stage 3 (moderate).  Plan: will avoid metformin and ACEI.   --> secondary to diabetes     Problem/Plan - 5:  ·  Problem: PVD (peripheral vascular disease).  Plan: aspirin and atorvastatin.   --> inc lipitor dose     Problem/Plan - 6:  Problem: SOB (shortness of breath) on exertion. Plan: Echo cardiogram pending     7) cough --> hycodan    8) hypomagnesemia --> replete    9) dvt prop --> lovenox sub q

## 2017-11-05 LAB
ANION GAP SERPL CALC-SCNC: 13 MMOL/L — SIGNIFICANT CHANGE UP (ref 5–17)
APPEARANCE UR: CLEAR — SIGNIFICANT CHANGE UP
BILIRUB UR-MCNC: NEGATIVE — SIGNIFICANT CHANGE UP
BUN SERPL-MCNC: 44 MG/DL — HIGH (ref 8–20)
CALCIUM SERPL-MCNC: 8.4 MG/DL — LOW (ref 8.6–10.2)
CHLORIDE SERPL-SCNC: 102 MMOL/L — SIGNIFICANT CHANGE UP (ref 98–107)
CO2 SERPL-SCNC: 21 MMOL/L — LOW (ref 22–29)
COLOR SPEC: YELLOW — SIGNIFICANT CHANGE UP
CREAT SERPL-MCNC: 2.64 MG/DL — HIGH (ref 0.5–1.3)
DIFF PNL FLD: ABNORMAL
EPI CELLS # UR: SIGNIFICANT CHANGE UP
GLUCOSE BLDC GLUCOMTR-MCNC: 152 MG/DL — HIGH (ref 70–99)
GLUCOSE BLDC GLUCOMTR-MCNC: 161 MG/DL — HIGH (ref 70–99)
GLUCOSE BLDC GLUCOMTR-MCNC: 161 MG/DL — HIGH (ref 70–99)
GLUCOSE BLDC GLUCOMTR-MCNC: 164 MG/DL — HIGH (ref 70–99)
GLUCOSE SERPL-MCNC: 145 MG/DL — HIGH (ref 70–115)
GLUCOSE UR QL: 100 MG/DL
HCT VFR BLD CALC: 29.7 % — LOW (ref 42–52)
HGB BLD-MCNC: 9.7 G/DL — LOW (ref 14–18)
KETONES UR-MCNC: NEGATIVE — SIGNIFICANT CHANGE UP
LEUKOCYTE ESTERASE UR-ACNC: ABNORMAL
MAGNESIUM SERPL-MCNC: 2.1 MG/DL — SIGNIFICANT CHANGE UP (ref 1.6–2.6)
MCHC RBC-ENTMCNC: 29.5 PG — SIGNIFICANT CHANGE UP (ref 27–31)
MCHC RBC-ENTMCNC: 32.7 G/DL — SIGNIFICANT CHANGE UP (ref 32–36)
MCV RBC AUTO: 90.3 FL — SIGNIFICANT CHANGE UP (ref 80–94)
NITRITE UR-MCNC: NEGATIVE — SIGNIFICANT CHANGE UP
PH UR: 6 — SIGNIFICANT CHANGE UP (ref 5–8)
PLATELET # BLD AUTO: 324 K/UL — SIGNIFICANT CHANGE UP (ref 150–400)
POTASSIUM SERPL-MCNC: 4.1 MMOL/L — SIGNIFICANT CHANGE UP (ref 3.5–5.3)
POTASSIUM SERPL-SCNC: 4.1 MMOL/L — SIGNIFICANT CHANGE UP (ref 3.5–5.3)
PROT UR-MCNC: 500 MG/DL
RBC # BLD: 3.29 M/UL — LOW (ref 4.6–6.2)
RBC # FLD: 16.3 % — HIGH (ref 11–15.6)
RBC CASTS # UR COMP ASSIST: ABNORMAL /HPF (ref 0–4)
SODIUM SERPL-SCNC: 136 MMOL/L — SIGNIFICANT CHANGE UP (ref 135–145)
SP GR SPEC: 1.01 — SIGNIFICANT CHANGE UP (ref 1.01–1.02)
UROBILINOGEN FLD QL: NEGATIVE MG/DL — SIGNIFICANT CHANGE UP
VIT B12 SERPL-MCNC: 671 PG/ML — SIGNIFICANT CHANGE UP (ref 180–914)
WBC # BLD: 10.7 K/UL — SIGNIFICANT CHANGE UP (ref 4.8–10.8)
WBC # FLD AUTO: 10.7 K/UL — SIGNIFICANT CHANGE UP (ref 4.8–10.8)
WBC UR QL: ABNORMAL

## 2017-11-05 PROCEDURE — 99233 SBSQ HOSP IP/OBS HIGH 50: CPT

## 2017-11-05 RX ORDER — AZTREONAM 2 G
VIAL (EA) INJECTION
Qty: 0 | Refills: 0 | Status: DISCONTINUED | OUTPATIENT
Start: 2017-11-05 | End: 2017-11-06

## 2017-11-05 RX ORDER — AZTREONAM 2 G
1000 VIAL (EA) INJECTION ONCE
Qty: 0 | Refills: 0 | Status: COMPLETED | OUTPATIENT
Start: 2017-11-05 | End: 2017-11-05

## 2017-11-05 RX ADMIN — ATORVASTATIN CALCIUM 40 MILLIGRAM(S): 80 TABLET, FILM COATED ORAL at 21:50

## 2017-11-05 RX ADMIN — AMLODIPINE BESYLATE 10 MILLIGRAM(S): 2.5 TABLET ORAL at 06:22

## 2017-11-05 RX ADMIN — Medication 50 MILLIGRAM(S): at 21:49

## 2017-11-05 RX ADMIN — TAMSULOSIN HYDROCHLORIDE 0.4 MILLIGRAM(S): 0.4 CAPSULE ORAL at 21:50

## 2017-11-05 RX ADMIN — Medication 50 MILLIGRAM(S): at 06:22

## 2017-11-05 RX ADMIN — PANTOPRAZOLE SODIUM 40 MILLIGRAM(S): 20 TABLET, DELAYED RELEASE ORAL at 06:22

## 2017-11-05 RX ADMIN — Medication 325 MILLIGRAM(S): at 11:11

## 2017-11-05 RX ADMIN — Medication 50 MILLIGRAM(S): at 17:10

## 2017-11-05 RX ADMIN — Medication 1: at 17:09

## 2017-11-05 RX ADMIN — INSULIN GLARGINE 10 UNIT(S): 100 INJECTION, SOLUTION SUBCUTANEOUS at 08:33

## 2017-11-05 RX ADMIN — ENOXAPARIN SODIUM 30 MILLIGRAM(S): 100 INJECTION SUBCUTANEOUS at 21:49

## 2017-11-05 RX ADMIN — Medication 1: at 08:33

## 2017-11-05 NOTE — PROGRESS NOTE ADULT - ASSESSMENT
82 y/o male with uncontrolled DM, HTN, CRI, PVD, pacemaker, multiple falls admitted for abd pain      Problem/Plan - 1:  ·  Problem: Multiple falls.   --> pt consult appreciated  --> may  need sandy     Problem/Plan - 2:  ·  Problem: Type 2 diabetes mellitus with complication, without long-term current use of insulin.  Plan: insulin therapy protocol.   --> a1c 7.0     Problem/Plan - 3:  ·  Problem: Essential hypertension.  Plan: Amlodipine and metoprolol.     Problem/Plan - 4:  ·  Problem: CRI (chronic renal insufficiency), stage 3 (moderate).  Plan: will avoid metformin and ACEI.   --> secondary to diabetes     Problem/Plan - 5:  ·  Problem: PVD (peripheral vascular disease).  Plan: aspirin and atorvastatin.     dispo --> patient needs insurance and placement  had long conversation with family and needs medications including insulin and needs diabetic teaching    transfer to medical floor

## 2017-11-05 NOTE — PHYSICAL THERAPY INITIAL EVALUATION ADULT - ADDITIONAL COMMENTS
Pt. is a poor historian. As per daughter Camilla at bedside, pt. lives with her and her spouse in a house with 20 CARL[address] and one rail. Pt. has no stairs inside. Daughter reports pt. was amb modified independent with a SAC in March, but has been declining since and is only able to amb short distances with assist at home. Daughter reports she is on a medical leave from work but is unable to assist and no body else is available at home. Also reports pt. spends most of his time in bed and occasionally is up to use the bathroom or sit andressa  chair/ w/c.

## 2017-11-05 NOTE — PHYSICAL THERAPY INITIAL EVALUATION ADULT - CRITERIA FOR SKILLED THERAPEUTIC INTERVENTIONS
anticipated equipment needs at discharge/impairments found/anticipated discharge recommendation/functional limitations in following categories/therapy frequency/risk reduction/prevention

## 2017-11-05 NOTE — PROGRESS NOTE ADULT - SUBJECTIVE AND OBJECTIVE BOX
JUAN MIGUEL PARADA    [id number]    83y      Male    INTERVAL HPI/OVERNIGHT EVENTS:    patient being seen for abd pain, ckd, dm2 and pvd. patient seen at bedside with family.       REVIEW OF SYSTEMS:    CONSTITUTIONAL: No fever, weight loss, or fatigue  RESPIRATORY: No cough, wheezing, hemoptysis; No shortness of breath  CARDIOVASCULAR: No chest pain, palpitations  GASTROINTESTINAL: No abdominal or epigastric pain. No nausea, vomiting  NEUROLOGICAL: No headaches, memory loss, loss of strength.  MISCELLANEOUS:      Vital Signs Last 24 Hrs  T(C): 36.5 (2017 10:58), Max: 37.2 (2017 15:40)  T(F): 97.7 (2017 10:58), Max: 99 (2017 15:40)  HR: 100 (2017 10:58) (62 - 100)  BP: 157/70 (2017 10:58) (134/65 - 157/70)  BP(mean): --  RR: 16 (2017 10:58) (16 - 18)  SpO2: 96% (2017 10:58) (96% - 96%)    PHYSICAL EXAM:    GENERAL: NAD, pleasant  HEENT: PERRL, +EOMI  NECK: soft, Supple, No JVD,   CHEST/LUNG: Clear to auscultation bilaterally;  HEART: S1S2+, Regular rate and rhythm; No murmurs  ABDOMEN: Soft, Nontender,  EXTREMITIES:  left tma, multiple old scars from prior surgeries  NEURO: AAOX3,   PSYCH: normal mood      LABS:                        9.7    10.7  )-----------( 324      ( 2017 06:19 )             29.7     11-05    136  |  102  |  44.0<H>  ----------------------------<  145<H>  4.1   |  21.0<L>  |  2.64<H>    Ca    8.4<L>      2017 06:19  Mg     2.1     11-05        Urinalysis Basic - ( 2017 00:28 )    Color: Yellow / Appearance: Clear / S.015 / pH: x  Gluc: x / Ketone: Negative  / Bili: Negative / Urobili: Negative mg/dL   Blood: x / Protein: 500 mg/dL / Nitrite: Negative   Leuk Esterase: Small / RBC: 3-5 /HPF / WBC 6-10   Sq Epi: x / Non Sq Epi: Few / Bacteria: x          MEDICATIONS  (STANDING):  amLODIPine   Tablet 10 milliGRAM(s) Oral daily  aspirin 325 milliGRAM(s) Oral daily  atorvastatin 40 milliGRAM(s) Oral at bedtime  dextrose 5%. 1000 milliLiter(s) (50 mL/Hr) IV Continuous <Continuous>  dextrose 50% Injectable 12.5 Gram(s) IV Push once  dextrose 50% Injectable 25 Gram(s) IV Push once  dextrose 50% Injectable 25 Gram(s) IV Push once  enoxaparin Injectable 30 milliGRAM(s) SubCutaneous daily  insulin glargine Injectable (LANTUS) 10 Unit(s) SubCutaneous every morning  insulin lispro (HumaLOG) corrective regimen sliding scale   SubCutaneous three times a day before meals  metoprolol     tartrate 50 milliGRAM(s) Oral two times a day  pantoprazole    Tablet 40 milliGRAM(s) Oral before breakfast  tamsulosin 0.4 milliGRAM(s) Oral at bedtime    MEDICATIONS  (PRN):  acetaminophen   Tablet. 650 milliGRAM(s) Oral every 6 hours PRN Mild Pain (1 - 3)  dextrose Gel 1 Dose(s) Oral once PRN Blood Glucose LESS THAN 70 milliGRAM(s)/deciliter  glucagon  Injectable 1 milliGRAM(s) IntraMuscular once PRN Glucose LESS THAN 70 milligrams/deciliter  hydrALAZINE Injectable 10 milliGRAM(s) IV Push every 4 hours PRN forsbp above 160 mmhg      RADIOLOGY & ADDITIONAL TESTS:

## 2017-11-05 NOTE — PHYSICAL THERAPY INITIAL EVALUATION ADULT - IMPAIRMENTS CONTRIBUTING TO GAIT DEVIATIONS, PT EVAL
cognition/impaired balance/decreased strength/pt. reporting dizziness during mab., limiting functional assessment. Subsided in supine

## 2017-11-06 ENCOUNTER — TRANSCRIPTION ENCOUNTER (OUTPATIENT)
Age: 82
End: 2017-11-06

## 2017-11-06 LAB
ANION GAP SERPL CALC-SCNC: 16 MMOL/L — SIGNIFICANT CHANGE UP (ref 5–17)
BUN SERPL-MCNC: 51 MG/DL — HIGH (ref 8–20)
CALCIUM SERPL-MCNC: 8.5 MG/DL — LOW (ref 8.6–10.2)
CHLORIDE SERPL-SCNC: 102 MMOL/L — SIGNIFICANT CHANGE UP (ref 98–107)
CO2 SERPL-SCNC: 19 MMOL/L — LOW (ref 22–29)
CREAT SERPL-MCNC: 2.4 MG/DL — HIGH (ref 0.5–1.3)
GLUCOSE BLDC GLUCOMTR-MCNC: 108 MG/DL — HIGH (ref 70–99)
GLUCOSE BLDC GLUCOMTR-MCNC: 122 MG/DL — HIGH (ref 70–99)
GLUCOSE BLDC GLUCOMTR-MCNC: 148 MG/DL — HIGH (ref 70–99)
GLUCOSE BLDC GLUCOMTR-MCNC: 208 MG/DL — HIGH (ref 70–99)
GLUCOSE SERPL-MCNC: 132 MG/DL — HIGH (ref 70–115)
HCT VFR BLD CALC: 30.2 % — LOW (ref 42–52)
HGB BLD-MCNC: 9.9 G/DL — LOW (ref 14–18)
MCHC RBC-ENTMCNC: 29.9 PG — SIGNIFICANT CHANGE UP (ref 27–31)
MCHC RBC-ENTMCNC: 32.8 G/DL — SIGNIFICANT CHANGE UP (ref 32–36)
MCV RBC AUTO: 91.2 FL — SIGNIFICANT CHANGE UP (ref 80–94)
PLATELET # BLD AUTO: 318 K/UL — SIGNIFICANT CHANGE UP (ref 150–400)
POTASSIUM SERPL-MCNC: 4.4 MMOL/L — SIGNIFICANT CHANGE UP (ref 3.5–5.3)
POTASSIUM SERPL-SCNC: 4.4 MMOL/L — SIGNIFICANT CHANGE UP (ref 3.5–5.3)
RBC # BLD: 3.31 M/UL — LOW (ref 4.6–6.2)
RBC # FLD: 16.2 % — HIGH (ref 11–15.6)
SODIUM SERPL-SCNC: 137 MMOL/L — SIGNIFICANT CHANGE UP (ref 135–145)
WBC # BLD: 11.4 K/UL — HIGH (ref 4.8–10.8)
WBC # FLD AUTO: 11.4 K/UL — HIGH (ref 4.8–10.8)

## 2017-11-06 RX ORDER — ASPIRIN/CALCIUM CARB/MAGNESIUM 324 MG
1 TABLET ORAL
Qty: 0 | Refills: 0 | COMMUNITY
Start: 2017-11-06

## 2017-11-06 RX ORDER — AMLODIPINE BESYLATE 2.5 MG/1
1 TABLET ORAL
Qty: 0 | Refills: 0 | COMMUNITY
Start: 2017-11-06

## 2017-11-06 RX ORDER — INSULIN GLARGINE 100 [IU]/ML
10 INJECTION, SOLUTION SUBCUTANEOUS
Qty: 0 | Refills: 0 | COMMUNITY
Start: 2017-11-06

## 2017-11-06 RX ORDER — CEFTRIAXONE 500 MG/1
INJECTION, POWDER, FOR SOLUTION INTRAMUSCULAR; INTRAVENOUS
Qty: 0 | Refills: 0 | Status: DISCONTINUED | OUTPATIENT
Start: 2017-11-06 | End: 2017-11-08

## 2017-11-06 RX ORDER — PANTOPRAZOLE SODIUM 20 MG/1
1 TABLET, DELAYED RELEASE ORAL
Qty: 0 | Refills: 0 | COMMUNITY
Start: 2017-11-06

## 2017-11-06 RX ORDER — CEFTRIAXONE 500 MG/1
1 INJECTION, POWDER, FOR SOLUTION INTRAMUSCULAR; INTRAVENOUS ONCE
Qty: 0 | Refills: 0 | Status: COMPLETED | OUTPATIENT
Start: 2017-11-06 | End: 2017-11-06

## 2017-11-06 RX ORDER — INSULIN GLARGINE 100 [IU]/ML
5 INJECTION, SOLUTION SUBCUTANEOUS
Qty: 0 | Refills: 0 | COMMUNITY
Start: 2017-11-06

## 2017-11-06 RX ORDER — INSULIN GLARGINE 100 [IU]/ML
12 INJECTION, SOLUTION SUBCUTANEOUS
Qty: 0 | Refills: 0 | COMMUNITY
Start: 2017-11-06

## 2017-11-06 RX ORDER — TAMSULOSIN HYDROCHLORIDE 0.4 MG/1
1 CAPSULE ORAL
Qty: 0 | Refills: 0 | COMMUNITY
Start: 2017-11-06

## 2017-11-06 RX ORDER — ATORVASTATIN CALCIUM 80 MG/1
1 TABLET, FILM COATED ORAL
Qty: 0 | Refills: 0 | COMMUNITY
Start: 2017-11-06

## 2017-11-06 RX ORDER — METOPROLOL TARTRATE 50 MG
1 TABLET ORAL
Qty: 0 | Refills: 0 | COMMUNITY
Start: 2017-11-06

## 2017-11-06 RX ADMIN — Medication 650 MILLIGRAM(S): at 11:05

## 2017-11-06 RX ADMIN — TAMSULOSIN HYDROCHLORIDE 0.4 MILLIGRAM(S): 0.4 CAPSULE ORAL at 22:40

## 2017-11-06 RX ADMIN — Medication 650 MILLIGRAM(S): at 10:05

## 2017-11-06 RX ADMIN — CEFTRIAXONE 100 GRAM(S): 500 INJECTION, POWDER, FOR SOLUTION INTRAMUSCULAR; INTRAVENOUS at 09:54

## 2017-11-06 RX ADMIN — AMLODIPINE BESYLATE 10 MILLIGRAM(S): 2.5 TABLET ORAL at 05:29

## 2017-11-06 RX ADMIN — ENOXAPARIN SODIUM 30 MILLIGRAM(S): 100 INJECTION SUBCUTANEOUS at 22:40

## 2017-11-06 RX ADMIN — Medication 2: at 12:04

## 2017-11-06 RX ADMIN — Medication 50 MILLIGRAM(S): at 05:29

## 2017-11-06 RX ADMIN — Medication 50 MILLIGRAM(S): at 17:41

## 2017-11-06 RX ADMIN — INSULIN GLARGINE 10 UNIT(S): 100 INJECTION, SOLUTION SUBCUTANEOUS at 09:49

## 2017-11-06 RX ADMIN — PANTOPRAZOLE SODIUM 40 MILLIGRAM(S): 20 TABLET, DELAYED RELEASE ORAL at 05:29

## 2017-11-06 RX ADMIN — ATORVASTATIN CALCIUM 40 MILLIGRAM(S): 80 TABLET, FILM COATED ORAL at 22:40

## 2017-11-06 RX ADMIN — Medication 325 MILLIGRAM(S): at 12:04

## 2017-11-06 NOTE — DISCHARGE NOTE ADULT - PATIENT PORTAL LINK FT
“You can access the [facility] Patient Portal, offered by [facility], by registering with the following website: [email or web address]”

## 2017-11-06 NOTE — DISCHARGE NOTE ADULT - SECONDARY DIAGNOSIS.
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

## 2017-11-06 NOTE — DISCHARGE NOTE ADULT - MEDICATION SUMMARY - MEDICATIONS TO TAKE
I will START or STAY ON the medications listed below when I get home from the hospital:    aspirin 325 mg oral tablet  -- 1 tab(s) by mouth once a day  -- Indication: For Heart health    tamsulosin 0.4 mg oral capsule  -- 1 cap(s) by mouth once a day (at bedtime)  -- Indication: For bph    insulin glargine  -- 12 unit(s) subcutaneous once a day (at bedtime)  -- Indication: For Diabetes    atorvastatin 40 mg oral tablet  -- 1 tab(s) by mouth once a day (at bedtime)  -- Indication: For Hyperlipidemia    metoprolol tartrate 50 mg oral tablet  -- 1 tab(s) by mouth 2 times a day  -- Indication: For HTN (hypertension)    amLODIPine 10 mg oral tablet  -- 1 tab(s) by mouth once a day  -- Indication: For HTN (hypertension)    Bactroban 2% topical cream  -- Apply on skin to affected area 2 times a day   -- For external use only.    -- Indication: For rash    pantoprazole 40 mg oral delayed release tablet  -- 1 tab(s) by mouth once a day (before a meal)  -- Indication: For gerd    Levaquin 750 mg oral tablet  -- 1 tab(s) by mouth every 24 hours   -- Avoid prolonged or excessive exposure to direct and/or artificial sunlight while taking this medication.  Do not take dairy products, antacids, or iron preparations within one hour of this medication.  Finish all this medication unless otherwise directed by prescriber.  May cause drowsiness or dizziness.  Medication should be taken with plenty of water.    -- Indication: For uti I will START or STAY ON the medications listed below when I get home from the hospital:    aspirin 325 mg oral tablet  -- 1 tab(s) by mouth once a day  -- Indication: For Heart health    tamsulosin 0.4 mg oral capsule  -- 1 cap(s) by mouth once a day (at bedtime)  -- Indication: For bph    insulin glargine  -- 12 unit(s) subcutaneous once a day (at bedtime)  -- Indication: For Diabetes    atorvastatin 40 mg oral tablet  -- 1 tab(s) by mouth once a day (at bedtime)  -- Indication: For Hyperlipidemia    metoprolol tartrate 50 mg oral tablet  -- 1 tab(s) by mouth 2 times a day  -- Indication: For HTN (hypertension)    amLODIPine 10 mg oral tablet  -- 1 tab(s) by mouth once a day  -- Indication: For HTN (hypertension)    Bactroban 2% topical cream  -- Apply on skin to affected area 2 times a day   -- For external use only.    -- Indication: For rash    pantoprazole 40 mg oral delayed release tablet  -- 1 tab(s) by mouth once a day (before a meal)  -- Indication: For gerd    Levaquin 250 mg oral tablet  -- 1 tab(s) by mouth every 24 hours   -- Avoid prolonged or excessive exposure to direct and/or artificial sunlight while taking this medication.  Do not take dairy products, antacids, or iron preparations within one hour of this medication.  Finish all this medication unless otherwise directed by prescriber.  May cause drowsiness or dizziness.  Medication should be taken with plenty of water.    -- Indication: For UTI (urinary tract infection) I will START or STAY ON the medications listed below when I get home from the hospital:    aspirin 325 mg oral tablet  -- 1 tab(s) by mouth once a day  -- Indication: For Heart health    tamsulosin 0.4 mg oral capsule  -- 1 cap(s) by mouth once a day (at bedtime)  -- Indication: For bph    insulin glargine  -- 10 unit(s) subcutaneous once a day (at bedtime)  -- Indication: For Diabetes    atorvastatin 40 mg oral tablet  -- 1 tab(s) by mouth once a day (at bedtime)  -- Indication: For Hyperlipidemia    metoprolol tartrate 50 mg oral tablet  -- 1 tab(s) by mouth 2 times a day  -- Indication: For HTN (hypertension)    amLODIPine 10 mg oral tablet  -- 1 tab(s) by mouth once a day  -- Indication: For HTN (hypertension)    Bactroban 2% topical cream  -- Apply on skin to affected area 2 times a day   -- For external use only.    -- Indication: For rash    nystatin-triamcinolone 100,000 units/g-0.1% topical cream  -- 1 application on skin 2 times a day  -- Indication: For RASH    vancomycin 125 mg oral capsule  -- 1 cap(s) by mouth every 6 hours  -- Indication: For C. difficile diarrhea    lactobacillus acidophilus oral capsule  -- 1 cap(s) by mouth once a day  -- Indication: For C. difficile diarrhea    pantoprazole 40 mg oral delayed release tablet  -- 1 tab(s) by mouth once a day (before a meal)  -- Indication: For gerd

## 2017-11-06 NOTE — DISCHARGE NOTE ADULT - PLAN OF CARE
resolved qualified for [facility] home [facility] low carb diet stable secondary to dm2  advised family to avoid nsaids or any meds that injure the kideny home meds dc with levaquin follow up with podiatry Infectinon resolution Continue with vancomycin 125mg orally every 6 hours until November 23. Follow up with your primary care doctor in one week. Wound Care Dressing Orders  1. Remove old dressing  2. Apply gauze  3. Wrap with kerlix  4. Keep dressing clean, dry, and intact to the left foot  5. Follow up with Dr. Jain as outpatient for further wound care.     Patient without signs of infection of foot. Would not give systemic antibiotic therapy in a person with acute c. diff colitis. Would benefit from Local wound care.

## 2017-11-06 NOTE — DISCHARGE NOTE ADULT - HOSPITAL COURSE
82 y/o male with h/o DM II, HTN, PVD, CRI, pacemaker  who was recently moved from [place], started to have generalized abdominal pain and diarrhea 1 day ago. no fever. no nausea or vomiting. no blood in the stools. symptoms resolved spontaneously patient also c/o SOB on walking. as per ER notes, Daughter added that the patient is bed for the last 2 weeks feeling weak and had multiple falls. Labs shows Glu: 312 mg/dl    patient admitted to medicine and seen by vascular in consult. Patient cleared by vascular. Hemoglobin a1c was 7.0 and seen by pt who recommends [facility]. Patient also noticed to have a uti and will be discharged on abx,. Patients ckd was stable and is now medically stable for dc to [facility].       time spent on dc 32 minutes 84 y/o male with h/o DM II, HTN, PVD, CRI, pacemaker  who was recently moved from [place], started to have generalized abdominal pain and diarrhea 1 day ago. no fever. no nausea or vomiting. no blood in the stools. symptoms resolved spontaneously patient also c/o SOB on walking. as per ER notes, Daughter added that the patient is bed for the last 2 weeks feeling weak and had multiple falls. Labs shows Glu: 312 mg/dl     patient admitted to medicine and seen by vascular in consult. Patient cleared by vascular. Hemoglobin a1c was 7.0 and seen by pt who recommends [facility]. Found to have proteus UTI and treated with augmentin. Was C. diff positive and started on vancomycin,. Patients is now medically stable for dc to [facility].       time spent on dc >35 minutes

## 2017-11-06 NOTE — DISCHARGE NOTE ADULT - CARE PROVIDER_API CALL
Kemar Jain (DPSHAR), Podiatric Medicine and Surgery  [address] [place] [address]  Phone: [phone]  Fax: [phone]    primary care,   Phone: (   )    -  Fax: (   )    -

## 2017-11-06 NOTE — DISCHARGE NOTE ADULT - CARE PLAN
Principal Discharge DX:	Ataxia  Goal:	resolved  Instructions for follow-up, activity and diet:	qualified for [facility]  Secondary Diagnosis:	Diabetes  Goal:	home lantus  Instructions for follow-up, activity and diet:	low carb diet  Secondary Diagnosis:	CRI (chronic renal insufficiency), stage 3 (moderate)  Goal:	stable  Instructions for follow-up, activity and diet:	secondary to dm2  advised family to avoid nsaids or any meds that injure the kideny  Secondary Diagnosis:	HTN (hypertension)  Goal:	home meds  Secondary Diagnosis:	UTI (urinary tract infection)  Goal:	dc with levaquin  Secondary Diagnosis:	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 difficile diarrhea  Goal:	Infectinon resolution  Instructions for follow-up, activity and diet:	Continue with vancomycin 125mg orally every 6 hours until November 23. Follow up with your primary care doctor in one week.  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 difficile diarrhea  Goal:	Infectinon resolution  Instructions for follow-up, activity and diet:	Continue with vancomycin 125mg orally every 6 hours until November 23. Follow up with your primary care doctor in one week.  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 Remove old dressing  2. Apply gauze  3. Wrap with kerlix  4. Keep dressing clean, dry, and intact to the left foot  5. Follow up with Dr. Jain as outpatient for further wound care.     Patient without signs of infection of foot. Would not give systemic antibiotic therapy in a person with acute c. diff colitis. Would benefit from Local wound care.  Secondary Diagnosis:	Foot ulcer

## 2017-11-07 LAB
-  AMIKACIN: SIGNIFICANT CHANGE UP
-  AMPICILLIN/SULBACTAM: SIGNIFICANT CHANGE UP
-  AMPICILLIN: SIGNIFICANT CHANGE UP
-  AZTREONAM: SIGNIFICANT CHANGE UP
-  CEFAZOLIN: SIGNIFICANT CHANGE UP
-  CEFEPIME: SIGNIFICANT CHANGE UP
-  CEFOXITIN: SIGNIFICANT CHANGE UP
-  CEFTAZIDIME: SIGNIFICANT CHANGE UP
-  CEFTRIAXONE: SIGNIFICANT CHANGE UP
-  CIPROFLOXACIN: SIGNIFICANT CHANGE UP
-  ERTAPENEM: SIGNIFICANT CHANGE UP
-  GENTAMICIN: SIGNIFICANT CHANGE UP
-  LEVOFLOXACIN: SIGNIFICANT CHANGE UP
-  MEROPENEM: SIGNIFICANT CHANGE UP
-  NITROFURANTOIN: SIGNIFICANT CHANGE UP
-  PIPERACILLIN/TAZOBACTAM: SIGNIFICANT CHANGE UP
-  TOBRAMYCIN: SIGNIFICANT CHANGE UP
-  TRIMETHOPRIM/SULFAMETHOXAZOLE: SIGNIFICANT CHANGE UP
24R-OH-CALCIDIOL SERPL-MCNC: 21.8 NG/ML — LOW (ref 30–80)
CULTURE RESULTS: SIGNIFICANT CHANGE UP
GLUCOSE BLDC GLUCOMTR-MCNC: 104 MG/DL — HIGH (ref 70–99)
GLUCOSE BLDC GLUCOMTR-MCNC: 161 MG/DL — HIGH (ref 70–99)
GLUCOSE BLDC GLUCOMTR-MCNC: 166 MG/DL — HIGH (ref 70–99)
GLUCOSE BLDC GLUCOMTR-MCNC: 167 MG/DL — HIGH (ref 70–99)
METHOD TYPE: SIGNIFICANT CHANGE UP
ORGANISM # SPEC MICROSCOPIC CNT: SIGNIFICANT CHANGE UP
ORGANISM # SPEC MICROSCOPIC CNT: SIGNIFICANT CHANGE UP
SPECIMEN SOURCE: SIGNIFICANT CHANGE UP

## 2017-11-07 RX ORDER — INSULIN GLARGINE 100 [IU]/ML
7 INJECTION, SOLUTION SUBCUTANEOUS AT BEDTIME
Qty: 0 | Refills: 0 | Status: DISCONTINUED | OUTPATIENT
Start: 2017-11-07 | End: 2017-11-16

## 2017-11-07 RX ADMIN — ENOXAPARIN SODIUM 30 MILLIGRAM(S): 100 INJECTION SUBCUTANEOUS at 22:20

## 2017-11-07 RX ADMIN — Medication 1: at 12:46

## 2017-11-07 RX ADMIN — ATORVASTATIN CALCIUM 40 MILLIGRAM(S): 80 TABLET, FILM COATED ORAL at 22:19

## 2017-11-07 RX ADMIN — Medication 50 MILLIGRAM(S): at 17:46

## 2017-11-07 RX ADMIN — INSULIN GLARGINE 7 UNIT(S): 100 INJECTION, SOLUTION SUBCUTANEOUS at 22:20

## 2017-11-07 RX ADMIN — PANTOPRAZOLE SODIUM 40 MILLIGRAM(S): 20 TABLET, DELAYED RELEASE ORAL at 05:54

## 2017-11-07 RX ADMIN — Medication 50 MILLIGRAM(S): at 05:54

## 2017-11-07 RX ADMIN — AMLODIPINE BESYLATE 10 MILLIGRAM(S): 2.5 TABLET ORAL at 05:55

## 2017-11-07 RX ADMIN — TAMSULOSIN HYDROCHLORIDE 0.4 MILLIGRAM(S): 0.4 CAPSULE ORAL at 22:21

## 2017-11-07 RX ADMIN — Medication 1: at 17:44

## 2017-11-07 RX ADMIN — Medication 325 MILLIGRAM(S): at 12:46

## 2017-11-07 NOTE — PROGRESS NOTE ADULT - ASSESSMENT
82 y/o male with uncontrolled DM, HTN, CRI, PVD, pacemaker, multiple falls admitted for abd pain      Problem/Plan - 1:  ·  Problem: Multiple falls.   --> pt consult appreciated  -->KYLEE     Problem/Plan - 2:  ·  Problem: Type 2 diabetes mellitus with complication, without long-term current use of insulin.  Plan: insulin therapy protocol.   --> a1c 7.0     Problem/Plan - 3:  ·  Problem: Essential hypertension.  Plan: Amlodipine and metoprolol.     Problem/Plan - 4:  ·  Problem: CRI (chronic renal insufficiency), stage 3 (moderate).  Plan: will avoid metformin and ACEI.   --> secondary to diabetes     Problem/Plan - 5:  ·  Problem: PVD (peripheral vascular disease).  Plan: aspirin and atorvastatin.     dispo --> placement issues    patient will be made [facility] 84 y/o male with uncontrolled DM, HTN, CRI, PVD, pacemaker, multiple falls admitted for abd pain      Problem/Plan - 1:  ·  Problem: Multiple falls.   --> pt consult appreciated  -->KYLEE     Problem/Plan - 2:  ·  Problem: Type 2 diabetes mellitus with complication, without long-term current use of insulin.  Plan: insulin therapy protocol.   --> a1c 7.0     Problem/Plan - 3:  ·  Problem: Essential hypertension.  Plan: Amlodipine and metoprolol.     Problem/Plan - 4:  ·  Problem: CRI (chronic renal insufficiency), stage 3 (moderate).  Plan: will avoid metformin and ACEI.   --> secondary to diabetes     Problem/Plan - 5:  ·  Problem: PVD (peripheral vascular disease).  Plan: aspirin and atorvastatin.     Problem 6 --> gram neg uti  --> c.w iv ceft  --> folow up identity     dispo --> placement issues    patient will be made [facility]

## 2017-11-07 NOTE — PROGRESS NOTE ADULT - SUBJECTIVE AND OBJECTIVE BOX
JUAN MIGUEL PARADA    [id number]    83y      Male    INTERVAL HPI/OVERNIGHT EVENTS:    patient being seen for dm2 and ckd. Patient seen at bedside and denies any complaints.       REVIEW OF SYSTEMS:    CONSTITUTIONAL: No fever, weight loss, or fatigue  RESPIRATORY: No cough, wheezing, hemoptysis; No shortness of breath  CARDIOVASCULAR: No chest pain, palpitations  GASTROINTESTINAL: No abdominal or epigastric pain. No nausea, vomiting  NEUROLOGICAL: No headaches, memory loss, loss of strength.  MISCELLANEOUS:      Vital Signs Last 24 Hrs  T(C): 37.4 (07 Nov 2017 05:52), Max: 37.4 (07 Nov 2017 05:52)  T(F): 99.4 (07 Nov 2017 05:52), Max: 99.4 (07 Nov 2017 05:52)  HR: 67 (07 Nov 2017 05:52) (60 - 67)  BP: 130/70 (07 Nov 2017 05:52) (120/64 - 130/70)  BP(mean): --  RR: 18 (07 Nov 2017 05:52) (18 - 18)  SpO2: 97% (07 Nov 2017 05:52) (96% - 98%)    PHYSICAL EXAM:    GENERAL: NAD, pleasant  HEENT: PERRL, +EOMI  NECK: soft, Supple, No JVD,   CHEST/LUNG: Clear to auscultation bilaterally;  HEART: S1S2+, Regular rate and rhythm; No murmurs  ABDOMEN: Soft, Nontender,  EXTREMITIES:  left tma, multiple old scars from prior surgeries  NEURO: AAOX3,   PSYCH: normal mood      LABS:                        9.9    11.4  )-----------( 318      ( 06 Nov 2017 05:50 )             30.2     11-06    137  |  102  |  51.0<H>  ----------------------------<  132<H>  4.4   |  19.0<L>  |  2.40<H>    Ca    8.5<L>      06 Nov 2017 05:50      MEDICATIONS  (STANDING):  amLODIPine   Tablet 10 milliGRAM(s) Oral daily  aspirin 325 milliGRAM(s) Oral daily  atorvastatin 40 milliGRAM(s) Oral at bedtime  cefTRIAXone   IVPB      dextrose 5%. 1000 milliLiter(s) (50 mL/Hr) IV Continuous <Continuous>  dextrose 50% Injectable 12.5 Gram(s) IV Push once  dextrose 50% Injectable 25 Gram(s) IV Push once  dextrose 50% Injectable 25 Gram(s) IV Push once  enoxaparin Injectable 30 milliGRAM(s) SubCutaneous daily  insulin glargine Injectable (LANTUS) 10 Unit(s) SubCutaneous every morning  insulin lispro (HumaLOG) corrective regimen sliding scale   SubCutaneous three times a day before meals  metoprolol     tartrate 50 milliGRAM(s) Oral two times a day  pantoprazole    Tablet 40 milliGRAM(s) Oral before breakfast  tamsulosin 0.4 milliGRAM(s) Oral at bedtime    MEDICATIONS  (PRN):  acetaminophen   Tablet. 650 milliGRAM(s) Oral every 6 hours PRN Mild Pain (1 - 3)  dextrose Gel 1 Dose(s) Oral once PRN Blood Glucose LESS THAN 70 milliGRAM(s)/deciliter  glucagon  Injectable 1 milliGRAM(s) IntraMuscular once PRN Glucose LESS THAN 70 milligrams/deciliter  hydrALAZINE Injectable 10 milliGRAM(s) IV Push every 4 hours PRN forsbp above 160 mmhg      RADIOLOGY & ADDITIONAL TESTS:

## 2017-11-08 LAB
GLUCOSE BLDC GLUCOMTR-MCNC: 131 MG/DL — HIGH (ref 70–99)
GLUCOSE BLDC GLUCOMTR-MCNC: 181 MG/DL — HIGH (ref 70–99)
GLUCOSE BLDC GLUCOMTR-MCNC: 190 MG/DL — HIGH (ref 70–99)
GLUCOSE BLDC GLUCOMTR-MCNC: 198 MG/DL — HIGH (ref 70–99)

## 2017-11-08 RX ADMIN — ATORVASTATIN CALCIUM 40 MILLIGRAM(S): 80 TABLET, FILM COATED ORAL at 21:22

## 2017-11-08 RX ADMIN — INSULIN GLARGINE 7 UNIT(S): 100 INJECTION, SOLUTION SUBCUTANEOUS at 22:16

## 2017-11-08 RX ADMIN — Medication 1: at 17:22

## 2017-11-08 RX ADMIN — Medication 1: at 11:30

## 2017-11-08 RX ADMIN — Medication 650 MILLIGRAM(S): at 19:45

## 2017-11-08 RX ADMIN — AMLODIPINE BESYLATE 10 MILLIGRAM(S): 2.5 TABLET ORAL at 06:00

## 2017-11-08 RX ADMIN — Medication 650 MILLIGRAM(S): at 12:00

## 2017-11-08 RX ADMIN — Medication 325 MILLIGRAM(S): at 11:30

## 2017-11-08 RX ADMIN — Medication 650 MILLIGRAM(S): at 11:30

## 2017-11-08 RX ADMIN — Medication 650 MILLIGRAM(S): at 20:45

## 2017-11-08 RX ADMIN — TAMSULOSIN HYDROCHLORIDE 0.4 MILLIGRAM(S): 0.4 CAPSULE ORAL at 21:22

## 2017-11-08 RX ADMIN — Medication 50 MILLIGRAM(S): at 17:22

## 2017-11-08 RX ADMIN — Medication 1 TABLET(S): at 17:22

## 2017-11-08 RX ADMIN — Medication 50 MILLIGRAM(S): at 06:00

## 2017-11-08 RX ADMIN — ENOXAPARIN SODIUM 30 MILLIGRAM(S): 100 INJECTION SUBCUTANEOUS at 21:22

## 2017-11-08 RX ADMIN — PANTOPRAZOLE SODIUM 40 MILLIGRAM(S): 20 TABLET, DELAYED RELEASE ORAL at 06:00

## 2017-11-08 NOTE — PROGRESS NOTE ADULT - SUBJECTIVE AND OBJECTIVE BOX
JUAN MIGUEL PARADA    [id number]    83y      Male    INTERVAL HPI/OVERNIGHT EVENTS:    patient being seen for debility, dm2, ckd and uti.     Vital Signs Last 24 Hrs  T(C): 36.7 (08 Nov 2017 10:05), Max: 37.4 (07 Nov 2017 15:32)  T(F): 98.1 (08 Nov 2017 10:05), Max: 99.4 (08 Nov 2017 05:58)  HR: 72 (08 Nov 2017 10:05) (60 - 72)  BP: 118/58 (08 Nov 2017 10:05) (115/55 - 140/64)  BP(mean): --  RR: 16 (08 Nov 2017 10:05) (16 - 18)  SpO2: 96% (08 Nov 2017 10:05) (95% - 98%)    PHYSICAL EXAM:    GENERAL: NAD, pleasant  HEENT: PERRL, +EOMI  NECK: soft, Supple, No JVD,   CHEST/LUNG: Clear to auscultation bilaterally;  HEART: S1S2+, Regular rate and rhythm; No murmurs  ABDOMEN: Soft, Nontender,  EXTREMITIES:  left tma, multiple old scars from prior surgeries  NEURO: AAOX3,       LABS:      MEDICATIONS  (STANDING):  amLODIPine   Tablet 10 milliGRAM(s) Oral daily  amoxicillin  500 milliGRAM(s)/clavulanate 1 Tablet(s) Oral two times a day  aspirin 325 milliGRAM(s) Oral daily  atorvastatin 40 milliGRAM(s) Oral at bedtime  dextrose 5%. 1000 milliLiter(s) (50 mL/Hr) IV Continuous <Continuous>  dextrose 50% Injectable 12.5 Gram(s) IV Push once  dextrose 50% Injectable 25 Gram(s) IV Push once  dextrose 50% Injectable 25 Gram(s) IV Push once  enoxaparin Injectable 30 milliGRAM(s) SubCutaneous daily  insulin glargine Injectable (LANTUS) 7 Unit(s) SubCutaneous at bedtime  insulin lispro (HumaLOG) corrective regimen sliding scale   SubCutaneous three times a day before meals  metoprolol     tartrate 50 milliGRAM(s) Oral two times a day  pantoprazole    Tablet 40 milliGRAM(s) Oral before breakfast  tamsulosin 0.4 milliGRAM(s) Oral at bedtime    MEDICATIONS  (PRN):  acetaminophen   Tablet. 650 milliGRAM(s) Oral every 6 hours PRN Mild Pain (1 - 3)  dextrose Gel 1 Dose(s) Oral once PRN Blood Glucose LESS THAN 70 milliGRAM(s)/deciliter  glucagon  Injectable 1 milliGRAM(s) IntraMuscular once PRN Glucose LESS THAN 70 milligrams/deciliter  hydrALAZINE Injectable 10 milliGRAM(s) IV Push every 4 hours PRN forsbp above 160 mmhg  HYDROcodone/homatropine Syrup 5 milliLiter(s) Oral every 6 hours PRN Cough      RADIOLOGY & ADDITIONAL TESTS:

## 2017-11-08 NOTE — DIETITIAN INITIAL EVALUATION ADULT. - OTHER INFO
Pt reports good po intake at meals- 100% consumed at breakfast per tray observation at bedside. Pt reports good po intake at meals- 100% consumed at breakfast per tray observation at bedside.  Attempted to review cons cho meal plan, however pt slightly confused and c/o pain.  Literature in Kazakh left at bedside.

## 2017-11-08 NOTE — PROGRESS NOTE ADULT - ASSESSMENT
1) proteus uti -> augmentin     2) dm2 ---> insulin   --> ssi    dispo --> awaiting emergency medicaid and placement to [facility]

## 2017-11-09 LAB
C DIFF BY PCR RESULT: DETECTED
C DIFF TOX GENS STL QL NAA+PROBE: SIGNIFICANT CHANGE UP
GLUCOSE BLDC GLUCOMTR-MCNC: 136 MG/DL — HIGH (ref 70–99)
GLUCOSE BLDC GLUCOMTR-MCNC: 158 MG/DL — HIGH (ref 70–99)
GLUCOSE BLDC GLUCOMTR-MCNC: 181 MG/DL — HIGH (ref 70–99)
GLUCOSE BLDC GLUCOMTR-MCNC: 202 MG/DL — HIGH (ref 70–99)

## 2017-11-09 RX ORDER — LACTOBACILLUS ACIDOPHILUS 100MM CELL
1 CAPSULE ORAL
Qty: 0 | Refills: 0 | Status: DISCONTINUED | OUTPATIENT
Start: 2017-11-09 | End: 2017-11-16

## 2017-11-09 RX ORDER — VANCOMYCIN HCL 1 G
125 VIAL (EA) INTRAVENOUS EVERY 6 HOURS
Qty: 0 | Refills: 0 | Status: DISCONTINUED | OUTPATIENT
Start: 2017-11-09 | End: 2017-11-16

## 2017-11-09 RX ADMIN — Medication 50 MILLIGRAM(S): at 17:32

## 2017-11-09 RX ADMIN — Medication 1: at 17:31

## 2017-11-09 RX ADMIN — INSULIN GLARGINE 7 UNIT(S): 100 INJECTION, SOLUTION SUBCUTANEOUS at 21:21

## 2017-11-09 RX ADMIN — Medication 50 MILLIGRAM(S): at 05:54

## 2017-11-09 RX ADMIN — ENOXAPARIN SODIUM 30 MILLIGRAM(S): 100 INJECTION SUBCUTANEOUS at 21:20

## 2017-11-09 RX ADMIN — Medication 1 TABLET(S): at 05:54

## 2017-11-09 RX ADMIN — Medication 125 MILLIGRAM(S): at 17:32

## 2017-11-09 RX ADMIN — Medication 125 MILLIGRAM(S): at 12:14

## 2017-11-09 RX ADMIN — Medication 650 MILLIGRAM(S): at 22:15

## 2017-11-09 RX ADMIN — ATORVASTATIN CALCIUM 40 MILLIGRAM(S): 80 TABLET, FILM COATED ORAL at 21:20

## 2017-11-09 RX ADMIN — PANTOPRAZOLE SODIUM 40 MILLIGRAM(S): 20 TABLET, DELAYED RELEASE ORAL at 05:54

## 2017-11-09 RX ADMIN — Medication 2: at 08:33

## 2017-11-09 RX ADMIN — Medication 325 MILLIGRAM(S): at 12:13

## 2017-11-09 RX ADMIN — Medication 650 MILLIGRAM(S): at 21:30

## 2017-11-09 RX ADMIN — TAMSULOSIN HYDROCHLORIDE 0.4 MILLIGRAM(S): 0.4 CAPSULE ORAL at 21:20

## 2017-11-09 RX ADMIN — AMLODIPINE BESYLATE 10 MILLIGRAM(S): 2.5 TABLET ORAL at 05:54

## 2017-11-09 RX ADMIN — Medication 1 TABLET(S): at 17:32

## 2017-11-09 NOTE — PROGRESS NOTE ADULT - ASSESSMENT
1) c dif --> start po vanco    2)  proteus uti -> completed augmentin     3) dm2 ---> insulin   --> ssi    dispo --> awaiting emergency medicaid and placement to [facility]

## 2017-11-09 NOTE — PROGRESS NOTE ADULT - SUBJECTIVE AND OBJECTIVE BOX
JUAN MIGUEL PARADA    [id number]    83y      Male    INTERVAL HPI/OVERNIGHT EVENTS:    patient being seen for dm2, ckd and med management. Patient developed diarrhea overnight and is now positive for c dif      REVIEW OF SYSTEMS:    CONSTITUTIONAL: No fever, weight loss, or fatigue  RESPIRATORY: No cough, wheezing, hemoptysis; No shortness of breath  CARDIOVASCULAR: No chest pain, palpitations  GASTROINTESTINAL: diarrhea  NEUROLOGICAL: No headaches, memory loss, loss of strength.  MISCELLANEOUS:      Vital Signs Last 24 Hrs  T(C): 37.4 (09 Nov 2017 10:26), Max: 37.4 (09 Nov 2017 10:26)  T(F): 99.3 (09 Nov 2017 10:26), Max: 99.3 (09 Nov 2017 10:26)  HR: 72 (09 Nov 2017 10:26) (67 - 75)  BP: 155/70 (09 Nov 2017 10:26) (102/56 - 155/70)  BP(mean): --  RR: 18 (09 Nov 2017 10:26) (16 - 18)  SpO2: 98% (09 Nov 2017 10:26) (98% - 99%)    PHYSICAL EXAM:    GENERAL: NAD, pleasant  HEENT: PERRL, +EOMI  NECK: soft, Supple, No JVD,   CHEST/LUNG: Clear to auscultation bilaterally;  HEART: S1S2+, Regular rate and rhythm; No murmurs  ABDOMEN: Soft, Nontender,  EXTREMITIES:  left tma, multiple old scars from prior surgeries  NEURO: AAOX3,       LABS:          MEDICATIONS  (STANDING):  amLODIPine   Tablet 10 milliGRAM(s) Oral daily  aspirin 325 milliGRAM(s) Oral daily  atorvastatin 40 milliGRAM(s) Oral at bedtime  dextrose 5%. 1000 milliLiter(s) (50 mL/Hr) IV Continuous <Continuous>  dextrose 50% Injectable 12.5 Gram(s) IV Push once  dextrose 50% Injectable 25 Gram(s) IV Push once  dextrose 50% Injectable 25 Gram(s) IV Push once  enoxaparin Injectable 30 milliGRAM(s) SubCutaneous daily  insulin glargine Injectable (LANTUS) 7 Unit(s) SubCutaneous at bedtime  insulin lispro (HumaLOG) corrective regimen sliding scale   SubCutaneous three times a day before meals  lactobacillus acidophilus 1 Tablet(s) Oral two times a day with meals  metoprolol     tartrate 50 milliGRAM(s) Oral two times a day  pantoprazole    Tablet 40 milliGRAM(s) Oral before breakfast  tamsulosin 0.4 milliGRAM(s) Oral at bedtime  vancomycin    Solution 125 milliGRAM(s) Oral every 6 hours    MEDICATIONS  (PRN):  acetaminophen   Tablet. 650 milliGRAM(s) Oral every 6 hours PRN Mild Pain (1 - 3)  dextrose Gel 1 Dose(s) Oral once PRN Blood Glucose LESS THAN 70 milliGRAM(s)/deciliter  glucagon  Injectable 1 milliGRAM(s) IntraMuscular once PRN Glucose LESS THAN 70 milligrams/deciliter  hydrALAZINE Injectable 10 milliGRAM(s) IV Push every 4 hours PRN forsbp above 160 mmhg  HYDROcodone/homatropine Syrup 5 milliLiter(s) Oral every 6 hours PRN Cough      RADIOLOGY & ADDITIONAL TESTS:

## 2017-11-10 LAB
ANION GAP SERPL CALC-SCNC: 15 MMOL/L — SIGNIFICANT CHANGE UP (ref 5–17)
BUN SERPL-MCNC: 60 MG/DL — HIGH (ref 8–20)
CALCIUM SERPL-MCNC: 8.2 MG/DL — LOW (ref 8.6–10.2)
CHLORIDE SERPL-SCNC: 106 MMOL/L — SIGNIFICANT CHANGE UP (ref 98–107)
CO2 SERPL-SCNC: 16 MMOL/L — LOW (ref 22–29)
CREAT SERPL-MCNC: 2.9 MG/DL — HIGH (ref 0.5–1.3)
GLUCOSE BLDC GLUCOMTR-MCNC: 126 MG/DL — HIGH (ref 70–99)
GLUCOSE BLDC GLUCOMTR-MCNC: 174 MG/DL — HIGH (ref 70–99)
GLUCOSE BLDC GLUCOMTR-MCNC: 215 MG/DL — HIGH (ref 70–99)
GLUCOSE BLDC GLUCOMTR-MCNC: 227 MG/DL — HIGH (ref 70–99)
GLUCOSE SERPL-MCNC: 123 MG/DL — HIGH (ref 70–115)
HCT VFR BLD CALC: 30.8 % — LOW (ref 42–52)
HGB BLD-MCNC: 10.4 G/DL — LOW (ref 14–18)
MAGNESIUM SERPL-MCNC: 1.8 MG/DL — SIGNIFICANT CHANGE UP (ref 1.6–2.6)
MCHC RBC-ENTMCNC: 29.5 PG — SIGNIFICANT CHANGE UP (ref 27–31)
MCHC RBC-ENTMCNC: 33.8 G/DL — SIGNIFICANT CHANGE UP (ref 32–36)
MCV RBC AUTO: 87.5 FL — SIGNIFICANT CHANGE UP (ref 80–94)
PLATELET # BLD AUTO: 336 K/UL — SIGNIFICANT CHANGE UP (ref 150–400)
POTASSIUM SERPL-MCNC: 4 MMOL/L — SIGNIFICANT CHANGE UP (ref 3.5–5.3)
POTASSIUM SERPL-SCNC: 4 MMOL/L — SIGNIFICANT CHANGE UP (ref 3.5–5.3)
RBC # BLD: 3.52 M/UL — LOW (ref 4.6–6.2)
RBC # FLD: 16.2 % — HIGH (ref 11–15.6)
SODIUM SERPL-SCNC: 137 MMOL/L — SIGNIFICANT CHANGE UP (ref 135–145)
WBC # BLD: 11.8 K/UL — HIGH (ref 4.8–10.8)
WBC # FLD AUTO: 11.8 K/UL — HIGH (ref 4.8–10.8)

## 2017-11-10 RX ADMIN — Medication 125 MILLIGRAM(S): at 17:54

## 2017-11-10 RX ADMIN — Medication 125 MILLIGRAM(S): at 23:53

## 2017-11-10 RX ADMIN — ENOXAPARIN SODIUM 30 MILLIGRAM(S): 100 INJECTION SUBCUTANEOUS at 22:05

## 2017-11-10 RX ADMIN — AMLODIPINE BESYLATE 10 MILLIGRAM(S): 2.5 TABLET ORAL at 06:30

## 2017-11-10 RX ADMIN — ATORVASTATIN CALCIUM 40 MILLIGRAM(S): 80 TABLET, FILM COATED ORAL at 22:05

## 2017-11-10 RX ADMIN — Medication 50 MILLIGRAM(S): at 17:54

## 2017-11-10 RX ADMIN — Medication 125 MILLIGRAM(S): at 04:34

## 2017-11-10 RX ADMIN — Medication 50 MILLIGRAM(S): at 06:30

## 2017-11-10 RX ADMIN — PANTOPRAZOLE SODIUM 40 MILLIGRAM(S): 20 TABLET, DELAYED RELEASE ORAL at 06:30

## 2017-11-10 RX ADMIN — TAMSULOSIN HYDROCHLORIDE 0.4 MILLIGRAM(S): 0.4 CAPSULE ORAL at 22:05

## 2017-11-10 RX ADMIN — Medication 125 MILLIGRAM(S): at 06:32

## 2017-11-10 RX ADMIN — Medication 2: at 12:21

## 2017-11-10 RX ADMIN — Medication 1 TABLET(S): at 17:54

## 2017-11-10 RX ADMIN — Medication 325 MILLIGRAM(S): at 12:21

## 2017-11-10 RX ADMIN — INSULIN GLARGINE 7 UNIT(S): 100 INJECTION, SOLUTION SUBCUTANEOUS at 22:09

## 2017-11-10 RX ADMIN — Medication 650 MILLIGRAM(S): at 07:25

## 2017-11-10 RX ADMIN — Medication 1 TABLET(S): at 09:40

## 2017-11-10 RX ADMIN — Medication 650 MILLIGRAM(S): at 06:31

## 2017-11-10 RX ADMIN — Medication 2: at 17:54

## 2017-11-10 RX ADMIN — Medication 125 MILLIGRAM(S): at 12:21

## 2017-11-10 NOTE — PROGRESS NOTE ADULT - SUBJECTIVE AND OBJECTIVE BOX
JUAN MIGUEL PARADA    [id number]    83y      Male    INTERVAL HPI/OVERNIGHT EVENTS:    off service note:  Patient is an 82 y/o male with h/o DM II, HTN, PVD, CRI, pacemaker, no health insurance  who was recently moved from [place] to live with son, started to have generalized abdominal pain and diarrhea  also c/o SOB on walking.  Daughter added that the patient is bed for the last 2 weeks feeling weak and had multiple falls.    REVIEW OF SYSTEMS:    CONSTITUTIONAL: No fever, weight loss, or fatigue  RESPIRATORY: No cough, wheezing, hemoptysis; No shortness of breath  CARDIOVASCULAR: No chest pain, palpitations  GASTROINTESTINAL: No abdominal or epigastric pain. No nausea, vomiting  NEUROLOGICAL: No headaches, memory loss, loss of strength.  MISCELLANEOUS:      Vital Signs Last 24 Hrs  T(C): 37 (10 Nov 2017 06:24), Max: 37.4 (09 Nov 2017 10:26)  T(F): 98.6 (10 Nov 2017 06:24), Max: 99.3 (09 Nov 2017 10:26)  HR: 67 (10 Nov 2017 06:24) (66 - 72)  BP: 132/60 (10 Nov 2017 06:24) (124/52 - 155/70)  BP(mean): --  RR: 18 (10 Nov 2017 06:24) (18 - 18)  SpO2: 97% (10 Nov 2017 06:24) (96% - 98%)    PHYSICAL EXAM:    GENERAL: NAD, well-groomed  HEENT: PERRL, +EOMI  NECK: soft, Supple, No JVD,   CHEST/LUNG: Clear to auscultation bilaterally; No wheezing  HEART: S1S2+, Regular rate and rhythm; No murmurs, rubs, or gallops  ABDOMEN: Soft, Nontender, Nondistended; Bowel sounds present  EXTREMITIES:  2+ Peripheral Pulses, No clubbing, cyanosis, or edema  SKIN: No rashes or lesions  NEURO: AAOX3, no focal deficits, no motor r sensory loss  PSYCH: normal mood      LABS:                        10.4   11.8  )-----------( 336      ( 10 Nov 2017 06:08 )             30.8     11-10    137  |  106  |  60.0<H>  ----------------------------<  123<H>  4.0   |  16.0<L>  |  2.90<H>    Ca    8.2<L>      10 Nov 2017 06:08  Mg     1.8     11-10        MEDICATIONS  (STANDING):  amLODIPine   Tablet 10 milliGRAM(s) Oral daily  aspirin 325 milliGRAM(s) Oral daily  atorvastatin 40 milliGRAM(s) Oral at bedtime  dextrose 5%. 1000 milliLiter(s) (50 mL/Hr) IV Continuous <Continuous>  dextrose 50% Injectable 12.5 Gram(s) IV Push once  dextrose 50% Injectable 25 Gram(s) IV Push once  dextrose 50% Injectable 25 Gram(s) IV Push once  enoxaparin Injectable 30 milliGRAM(s) SubCutaneous daily  insulin glargine Injectable (LANTUS) 7 Unit(s) SubCutaneous at bedtime  insulin lispro (HumaLOG) corrective regimen sliding scale   SubCutaneous three times a day before meals  lactobacillus acidophilus 1 Tablet(s) Oral two times a day with meals  metoprolol     tartrate 50 milliGRAM(s) Oral two times a day  pantoprazole    Tablet 40 milliGRAM(s) Oral before breakfast  tamsulosin 0.4 milliGRAM(s) Oral at bedtime  vancomycin    Solution 125 milliGRAM(s) Oral every 6 hours    MEDICATIONS  (PRN):  acetaminophen   Tablet. 650 milliGRAM(s) Oral every 6 hours PRN Mild Pain (1 - 3)  dextrose Gel 1 Dose(s) Oral once PRN Blood Glucose LESS THAN 70 milliGRAM(s)/deciliter  glucagon  Injectable 1 milliGRAM(s) IntraMuscular once PRN Glucose LESS THAN 70 milligrams/deciliter  hydrALAZINE Injectable 10 milliGRAM(s) IV Push every 4 hours PRN forsbp above 160 mmhg  HYDROcodone/homatropine Syrup 5 milliLiter(s) Oral every 6 hours PRN Cough      RADIOLOGY & ADDITIONAL TESTS: JUAN MIGUEL PARADA    [id number]    83y      Male    INTERVAL HPI/OVERNIGHT EVENTS:    off service note:  Patient is an 84 y/o male with h/o DM II, HTN, left TMA in [place], PVD, Ckd, pacemaker, no health insurance  who was recently moved from [place] to live with son, started to have generalized abdominal pain and diarrhea  also c/o SOB on walking.  Daughter added that the patient has been getting progressively weaker and has been having multiple falls. Patient admitted to medicine and seen by vascular in consult. Patient was cleared by vascular and seen by pt who recommends [facility]. Patient is currently awaiting emergency medicaid and was made [facility]. Patient then found to have a uti and was treated with abx and then developed diarrhea which was c dif positive. Patient is now on day #2 of po vanco.     Patient seen at bedside with  and states diarrhea is improved. Patient denies any complaints.       REVIEW OF SYSTEMS:    CONSTITUTIONAL: No fever, weight loss, or fatigue  RESPIRATORY: No cough, wheezing, hemoptysis; No shortness of breath  CARDIOVASCULAR: No chest pain, palpitations  GASTROINTESTINAL: No abdominal or epigastric pain. No nausea, vomiting  NEUROLOGICAL: No headaches, memory loss, loss of strength.  MISCELLANEOUS:      Vital Signs Last 24 Hrs  T(C): 37 (10 Nov 2017 06:24), Max: 37.4 (09 Nov 2017 10:26)  T(F): 98.6 (10 Nov 2017 06:24), Max: 99.3 (09 Nov 2017 10:26)  HR: 67 (10 Nov 2017 06:24) (66 - 72)  BP: 132/60 (10 Nov 2017 06:24) (124/52 - 155/70)  BP(mean): --  RR: 18 (10 Nov 2017 06:24) (18 - 18)  SpO2: 97% (10 Nov 2017 06:24) (96% - 98%)    PHYSICAL EXAM:    GENERAL: NAD,   HEENT: PERRL, +EOMI  NECK: soft, Supple, No JVD,   CHEST/LUNG: Clear to auscultation bilaterally;  HEART: S1S2+, Regular rate and rhythm; No murmurs  ABDOMEN: Soft, Nontender,  EXTREMITIES:  left tma, multiple old scars from prior surgeries  NEURO: AAOX3,       LABS:                        10.4   11.8  )-----------( 336      ( 10 Nov 2017 06:08 )             30.8     11-10    137  |  106  |  60.0<H>  ----------------------------<  123<H>  4.0   |  16.0<L>  |  2.90<H>    Ca    8.2<L>      10 Nov 2017 06:08  Mg     1.8     11-10        MEDICATIONS  (STANDING):  amLODIPine   Tablet 10 milliGRAM(s) Oral daily  aspirin 325 milliGRAM(s) Oral daily  atorvastatin 40 milliGRAM(s) Oral at bedtime  dextrose 5%. 1000 milliLiter(s) (50 mL/Hr) IV Continuous <Continuous>  dextrose 50% Injectable 12.5 Gram(s) IV Push once  dextrose 50% Injectable 25 Gram(s) IV Push once  dextrose 50% Injectable 25 Gram(s) IV Push once  enoxaparin Injectable 30 milliGRAM(s) SubCutaneous daily  insulin glargine Injectable (LANTUS) 7 Unit(s) SubCutaneous at bedtime  insulin lispro (HumaLOG) corrective regimen sliding scale   SubCutaneous three times a day before meals  lactobacillus acidophilus 1 Tablet(s) Oral two times a day with meals  metoprolol     tartrate 50 milliGRAM(s) Oral two times a day  pantoprazole    Tablet 40 milliGRAM(s) Oral before breakfast  tamsulosin 0.4 milliGRAM(s) Oral at bedtime  vancomycin    Solution 125 milliGRAM(s) Oral every 6 hours    MEDICATIONS  (PRN):  acetaminophen   Tablet. 650 milliGRAM(s) Oral every 6 hours PRN Mild Pain (1 - 3)  dextrose Gel 1 Dose(s) Oral once PRN Blood Glucose LESS THAN 70 milliGRAM(s)/deciliter  glucagon  Injectable 1 milliGRAM(s) IntraMuscular once PRN Glucose LESS THAN 70 milligrams/deciliter  hydrALAZINE Injectable 10 milliGRAM(s) IV Push every 4 hours PRN forsbp above 160 mmhg  HYDROcodone/homatropine Syrup 5 milliLiter(s) Oral every 6 hours PRN Cough      RADIOLOGY & ADDITIONAL TESTS:

## 2017-11-10 NOTE — PROGRESS NOTE ADULT - ASSESSMENT
1) c dif --> improved  -> c.w po vanco day #2 of 14  --> contact isolation    2)  proteus uti -> completed augmentin     3) dm2 ---> insulin   --> ssi    4) ckd --> secondary to DM2  --> bmp stable  --> avoid nephrotoxic agents    5) bph --> flomax    dispo --> awaiting emergency medicaid and placement to [facility]

## 2017-11-11 LAB
ANION GAP SERPL CALC-SCNC: 15 MMOL/L — SIGNIFICANT CHANGE UP (ref 5–17)
BUN SERPL-MCNC: 56 MG/DL — HIGH (ref 8–20)
CALCIUM SERPL-MCNC: 8.3 MG/DL — LOW (ref 8.6–10.2)
CHLORIDE SERPL-SCNC: 107 MMOL/L — SIGNIFICANT CHANGE UP (ref 98–107)
CO2 SERPL-SCNC: 17 MMOL/L — LOW (ref 22–29)
CREAT SERPL-MCNC: 2.67 MG/DL — HIGH (ref 0.5–1.3)
GLUCOSE BLDC GLUCOMTR-MCNC: 157 MG/DL — HIGH (ref 70–99)
GLUCOSE BLDC GLUCOMTR-MCNC: 160 MG/DL — HIGH (ref 70–99)
GLUCOSE BLDC GLUCOMTR-MCNC: 218 MG/DL — HIGH (ref 70–99)
GLUCOSE BLDC GLUCOMTR-MCNC: 239 MG/DL — HIGH (ref 70–99)
GLUCOSE SERPL-MCNC: 171 MG/DL — HIGH (ref 70–115)
MAGNESIUM SERPL-MCNC: 1.8 MG/DL — SIGNIFICANT CHANGE UP (ref 1.6–2.6)
POTASSIUM SERPL-MCNC: 4.2 MMOL/L — SIGNIFICANT CHANGE UP (ref 3.5–5.3)
POTASSIUM SERPL-SCNC: 4.2 MMOL/L — SIGNIFICANT CHANGE UP (ref 3.5–5.3)
SODIUM SERPL-SCNC: 139 MMOL/L — SIGNIFICANT CHANGE UP (ref 135–145)

## 2017-11-11 PROCEDURE — 99233 SBSQ HOSP IP/OBS HIGH 50: CPT

## 2017-11-11 RX ADMIN — Medication 2: at 13:20

## 2017-11-11 RX ADMIN — ATORVASTATIN CALCIUM 40 MILLIGRAM(S): 80 TABLET, FILM COATED ORAL at 21:07

## 2017-11-11 RX ADMIN — Medication 125 MILLIGRAM(S): at 13:19

## 2017-11-11 RX ADMIN — Medication 1 TABLET(S): at 09:30

## 2017-11-11 RX ADMIN — Medication 50 MILLIGRAM(S): at 05:43

## 2017-11-11 RX ADMIN — Medication 125 MILLIGRAM(S): at 17:36

## 2017-11-11 RX ADMIN — Medication 2: at 17:36

## 2017-11-11 RX ADMIN — Medication 1: at 09:30

## 2017-11-11 RX ADMIN — Medication 325 MILLIGRAM(S): at 13:19

## 2017-11-11 RX ADMIN — TAMSULOSIN HYDROCHLORIDE 0.4 MILLIGRAM(S): 0.4 CAPSULE ORAL at 21:08

## 2017-11-11 RX ADMIN — INSULIN GLARGINE 7 UNIT(S): 100 INJECTION, SOLUTION SUBCUTANEOUS at 21:07

## 2017-11-11 RX ADMIN — Medication 1 TABLET(S): at 17:35

## 2017-11-11 RX ADMIN — AMLODIPINE BESYLATE 10 MILLIGRAM(S): 2.5 TABLET ORAL at 05:43

## 2017-11-11 RX ADMIN — PANTOPRAZOLE SODIUM 40 MILLIGRAM(S): 20 TABLET, DELAYED RELEASE ORAL at 05:43

## 2017-11-11 RX ADMIN — ENOXAPARIN SODIUM 30 MILLIGRAM(S): 100 INJECTION SUBCUTANEOUS at 21:07

## 2017-11-11 RX ADMIN — Medication 125 MILLIGRAM(S): at 05:45

## 2017-11-11 RX ADMIN — Medication 50 MILLIGRAM(S): at 17:36

## 2017-11-11 NOTE — PROGRESS NOTE ADULT - ASSESSMENT
1) c dif --> improved  -> c.w po vanco day #3 of 14  --> contact isolation - possible dc in next 48 hours as stools more formed    2)  proteus uti -> completed augmentin     3) dm2 ---> insulin   --> ssi    4) ckd --> secondary to DM2  --> bmp stable  --> avoid nephrotoxic agents    5) bph --> flomax    dispo --> awaiting emergency medicaid and placement to [facility]

## 2017-11-11 NOTE — PROGRESS NOTE ADULT - SUBJECTIVE AND OBJECTIVE BOX
CC: Patient denied diarrhea.  2 semi-solid BMs yesterday per nursing and 1 so far today.    HPI:  84 y/o male with h/o DM II, HTN, PVD, CRI, pacemaker  who was recently moved from [place], started to have generalized abdominal pain and diarrhea 1 day ago. no fever. no nausea or vomiting. no blood in the stools. symptoms resolved spontaneously today. patient also c/o SOB on walking. as per ER notes, Daughter added that the patient is bed for the last 2 weeks feeling weak and had multiple falls. Labs shows Glu: 312 mg/dl (03 Nov 2017 01:46)    REVIEW OF SYSTEMS:    Patient denied fever, chills, abdominal pain, nausea, vomiting, cough, shortness of breath, chest pain or palpitations    Vital Signs Last 24 Hrs  T(C): 36.5 (11 Nov 2017 10:02), Max: 37.1 (10 Nov 2017 22:01)  T(F): 97.7 (11 Nov 2017 10:02), Max: 98.8 (11 Nov 2017 05:40)  HR: 82 (11 Nov 2017 10:02) (64 - 82)  BP: 98/77 (11 Nov 2017 10:02) (98/77 - 130/60)  BP(mean): --  RR: 18 (11 Nov 2017 10:02) (16 - 18)  SpO2: 99% (11 Nov 2017 05:40) (99% - 99%)I&O's Summary    10 Nov 2017 07:01  -  11 Nov 2017 07:00  --------------------------------------------------------  IN: 490 mL / OUT: 0 mL / NET: 490 mL    11 Nov 2017 07:01  -  11 Nov 2017 15:33  --------------------------------------------------------  IN: 480 mL / OUT: 0 mL / NET: 480 mL    CAPILLARY BLOOD GLUCOSE    PHYSICAL EXAM:  GENERAL: NAD, well-groomed  HEENT: PERRL, +EOMI, anicteric, no [place]  NECK: Supple, No JVD   CHEST/LUNG: CTA bilaterally; Normal effort  HEART: S1S2 Normal intensity, no murmurs, gallops or rubs noted  ABDOMEN: Soft, BS Normoactive, NT, ND, no HSM noted  EXTREMITIES:  2+ radial and DP pulses noted, no clubbing, cyanosis, or edema noted, FROM x 4  SKIN: No rashes or lesions noted  NEURO: A&Ox3, no focal deficits noted, CN II-XII intact  PSYCH: normal mood and affect; insight/judgement appropriate    LABS:                        10.4   11.8  )-----------( 336      ( 10 Nov 2017 06:08 )             30.8     11-11    139  |  107  |  56.0<H>  ----------------------------<  171<H>  4.2   |  17.0<L>  |  2.67<H>    Ca    8.3<L>      11 Nov 2017 05:34  Mg     1.8     11-11          RADIOLOGY & ADDITIONAL TESTS:    MEDICATIONS:  MEDICATIONS  (STANDING):  amLODIPine   Tablet 10 milliGRAM(s) Oral daily  aspirin 325 milliGRAM(s) Oral daily  atorvastatin 40 milliGRAM(s) Oral at bedtime  dextrose 5%. 1000 milliLiter(s) (50 mL/Hr) IV Continuous <Continuous>  dextrose 50% Injectable 12.5 Gram(s) IV Push once  dextrose 50% Injectable 25 Gram(s) IV Push once  dextrose 50% Injectable 25 Gram(s) IV Push once  enoxaparin Injectable 30 milliGRAM(s) SubCutaneous daily  insulin glargine Injectable (LANTUS) 7 Unit(s) SubCutaneous at bedtime  insulin lispro (HumaLOG) corrective regimen sliding scale   SubCutaneous three times a day before meals  lactobacillus acidophilus 1 Tablet(s) Oral two times a day with meals  metoprolol     tartrate 50 milliGRAM(s) Oral two times a day  pantoprazole    Tablet 40 milliGRAM(s) Oral before breakfast  tamsulosin 0.4 milliGRAM(s) Oral at bedtime  vancomycin    Solution 125 milliGRAM(s) Oral every 6 hours    MEDICATIONS  (PRN):  acetaminophen   Tablet. 650 milliGRAM(s) Oral every 6 hours PRN Mild Pain (1 - 3)  dextrose Gel 1 Dose(s) Oral once PRN Blood Glucose LESS THAN 70 milliGRAM(s)/deciliter  glucagon  Injectable 1 milliGRAM(s) IntraMuscular once PRN Glucose LESS THAN 70 milligrams/deciliter  hydrALAZINE Injectable 10 milliGRAM(s) IV Push every 4 hours PRN forsbp above 160 mmhg  HYDROcodone/homatropine Syrup 5 milliLiter(s) Oral every 6 hours PRN Cough CC: Patient denied diarrhea.  2 semi-solid BMs yesterday per nursing and 1 so far today.    HPI:  82 y/o male with h/o DM II, HTN, PVD, CRI, pacemaker  who was recently moved from [place], started to have generalized abdominal pain and diarrhea 1 day ago. no fever. no nausea or vomiting. no blood in the stools. symptoms resolved spontaneously today. patient also c/o SOB on walking. as per ER notes, Daughter added that the patient is bed for the last 2 weeks feeling weak and had multiple falls. Labs shows Glu: 312 mg/dl (03 Nov 2017 01:46)    REVIEW OF SYSTEMS:    Patient denied fever, chills, abdominal pain, nausea, vomiting, cough, shortness of breath, chest pain or palpitations    Vital Signs Last 24 Hrs  T(C): 36.5 (11 Nov 2017 10:02), Max: 37.1 (10 Nov 2017 22:01)  T(F): 97.7 (11 Nov 2017 10:02), Max: 98.8 (11 Nov 2017 05:40)  HR: 82 (11 Nov 2017 10:02) (64 - 82)  BP: 98/77 (11 Nov 2017 10:02) (98/77 - 130/60)  BP(mean): --  RR: 18 (11 Nov 2017 10:02) (16 - 18)  SpO2: 99% (11 Nov 2017 05:40) (99% - 99%)I&O's Summary    10 Nov 2017 07:01  -  11 Nov 2017 07:00  --------------------------------------------------------  IN: 490 mL / OUT: 0 mL / NET: 490 mL    11 Nov 2017 07:01  -  11 Nov 2017 15:33  --------------------------------------------------------  IN: 480 mL / OUT: 0 mL / NET: 480 mL    CAPILLARY BLOOD GLUCOSE    PHYSICAL EXAM:  GENERAL: NAD, well-groomed  HEENT: PERRL, +EOMI, anicteric, no [place]  NECK: Supple, No JVD   CHEST/LUNG: CTA bilaterally; Normal effort  HEART: S1S2 Normal intensity, no murmurs, gallops or rubs noted  ABDOMEN: Soft, BS Normoactive, NT, ND, no HSM noted  EXTREMITIES:  2+ radial and DP pulses noted, no clubbing, cyanosis, or edema noted, FROM x 4  SKIN: healed left lower ext vasc sx scar  NEURO: A&Ox3, no focal deficits noted, CN II-XII intact  PSYCH: normal mood and affect; insight/judgement appropriate    LABS:                        10.4   11.8  )-----------( 336      ( 10 Nov 2017 06:08 )             30.8     11-11    139  |  107  |  56.0<H>  ----------------------------<  171<H>  4.2   |  17.0<L>  |  2.67<H>    Ca    8.3<L>      11 Nov 2017 05:34  Mg     1.8     11-11          RADIOLOGY & ADDITIONAL TESTS:    MEDICATIONS:  MEDICATIONS  (STANDING):  amLODIPine   Tablet 10 milliGRAM(s) Oral daily  aspirin 325 milliGRAM(s) Oral daily  atorvastatin 40 milliGRAM(s) Oral at bedtime  dextrose 5%. 1000 milliLiter(s) (50 mL/Hr) IV Continuous <Continuous>  dextrose 50% Injectable 12.5 Gram(s) IV Push once  dextrose 50% Injectable 25 Gram(s) IV Push once  dextrose 50% Injectable 25 Gram(s) IV Push once  enoxaparin Injectable 30 milliGRAM(s) SubCutaneous daily  insulin glargine Injectable (LANTUS) 7 Unit(s) SubCutaneous at bedtime  insulin lispro (HumaLOG) corrective regimen sliding scale   SubCutaneous three times a day before meals  lactobacillus acidophilus 1 Tablet(s) Oral two times a day with meals  metoprolol     tartrate 50 milliGRAM(s) Oral two times a day  pantoprazole    Tablet 40 milliGRAM(s) Oral before breakfast  tamsulosin 0.4 milliGRAM(s) Oral at bedtime  vancomycin    Solution 125 milliGRAM(s) Oral every 6 hours    MEDICATIONS  (PRN):  acetaminophen   Tablet. 650 milliGRAM(s) Oral every 6 hours PRN Mild Pain (1 - 3)  dextrose Gel 1 Dose(s) Oral once PRN Blood Glucose LESS THAN 70 milliGRAM(s)/deciliter  glucagon  Injectable 1 milliGRAM(s) IntraMuscular once PRN Glucose LESS THAN 70 milligrams/deciliter  hydrALAZINE Injectable 10 milliGRAM(s) IV Push every 4 hours PRN forsbp above 160 mmhg  HYDROcodone/homatropine Syrup 5 milliLiter(s) Oral every 6 hours PRN Cough

## 2017-11-12 LAB
GLUCOSE BLDC GLUCOMTR-MCNC: 173 MG/DL — HIGH (ref 70–99)
GLUCOSE BLDC GLUCOMTR-MCNC: 175 MG/DL — HIGH (ref 70–99)
GLUCOSE BLDC GLUCOMTR-MCNC: 225 MG/DL — HIGH (ref 70–99)
GLUCOSE BLDC GLUCOMTR-MCNC: 294 MG/DL — HIGH (ref 70–99)

## 2017-11-12 PROCEDURE — 99233 SBSQ HOSP IP/OBS HIGH 50: CPT

## 2017-11-12 RX ADMIN — Medication 125 MILLIGRAM(S): at 23:28

## 2017-11-12 RX ADMIN — AMLODIPINE BESYLATE 10 MILLIGRAM(S): 2.5 TABLET ORAL at 05:59

## 2017-11-12 RX ADMIN — Medication 1: at 17:40

## 2017-11-12 RX ADMIN — ATORVASTATIN CALCIUM 40 MILLIGRAM(S): 80 TABLET, FILM COATED ORAL at 22:02

## 2017-11-12 RX ADMIN — INSULIN GLARGINE 7 UNIT(S): 100 INJECTION, SOLUTION SUBCUTANEOUS at 22:02

## 2017-11-12 RX ADMIN — Medication 50 MILLIGRAM(S): at 17:40

## 2017-11-12 RX ADMIN — Medication 325 MILLIGRAM(S): at 11:34

## 2017-11-12 RX ADMIN — TAMSULOSIN HYDROCHLORIDE 0.4 MILLIGRAM(S): 0.4 CAPSULE ORAL at 22:02

## 2017-11-12 RX ADMIN — Medication 125 MILLIGRAM(S): at 17:40

## 2017-11-12 RX ADMIN — Medication 1 TABLET(S): at 17:40

## 2017-11-12 RX ADMIN — Medication 125 MILLIGRAM(S): at 05:59

## 2017-11-12 RX ADMIN — Medication 1 TABLET(S): at 09:38

## 2017-11-12 RX ADMIN — Medication 125 MILLIGRAM(S): at 00:12

## 2017-11-12 RX ADMIN — Medication: at 08:30

## 2017-11-12 RX ADMIN — Medication 3: at 12:48

## 2017-11-12 RX ADMIN — Medication 125 MILLIGRAM(S): at 11:34

## 2017-11-12 RX ADMIN — ENOXAPARIN SODIUM 30 MILLIGRAM(S): 100 INJECTION SUBCUTANEOUS at 22:02

## 2017-11-12 RX ADMIN — PANTOPRAZOLE SODIUM 40 MILLIGRAM(S): 20 TABLET, DELAYED RELEASE ORAL at 05:59

## 2017-11-12 RX ADMIN — Medication 50 MILLIGRAM(S): at 05:59

## 2017-11-12 NOTE — PROGRESS NOTE ADULT - ASSESSMENT
1) c dif --> improved  -> c.w po vanco day #3 of 14  --> contact isolation - possible dc in next 48 hours as stools more formed    2)  proteus uti -> completed augmentin     3) dm2 ---> controlled   --> ssi    4) ckd --> secondary to DM2  --> bmp stable  --> avoid nephrotoxic agents    5) bph --> flomax    6) phimosis - possible cause of UTI - Urology consulted - Dr. West called. ? needs circumcision    dispo --> awaiting emergency medicaid and placement [facility]

## 2017-11-12 NOTE — PROGRESS NOTE ADULT - SUBJECTIVE AND OBJECTIVE BOX
CC: Patient noted that his diarrhea has resolved.  However, he did complain of pain on urination as well as penile pain    HPI:  82 y/o male with h/o DM II, HTN, PVD, CRI, pacemaker  who was recently moved from [place], started to have generalized abdominal pain and diarrhea 1 day ago. no fever. no nausea or vomiting. no blood in the stools. symptoms resolved spontaneously today. patient also c/o SOB on walking. as per ER notes, Daughter added that the patient is bed for the last 2 weeks feeling weak and had multiple falls. Labs shows Glu: 312 mg/dl (03 Nov 2017 01:46)    REVIEW OF SYSTEMS:    Patient denied fever, chills, abdominal pain, nausea, vomiting, cough, shortness of breath, chest pain or palpitations    Vital Signs Last 24 Hrs  T(C): 36.7 (12 Nov 2017 15:19), Max: 37.2 (11 Nov 2017 20:49)  T(F): 98 (12 Nov 2017 15:19), Max: 98.9 (11 Nov 2017 20:49)  HR: 69 (12 Nov 2017 15:19) (63 - 70)  BP: 112/62 (12 Nov 2017 15:19) (110/56 - 132/60)  BP(mean): --  RR: 18 (12 Nov 2017 15:19) (16 - 18)  SpO2: 97% (12 Nov 2017 05:57) (97% - 97%)I&O's Summary    11 Nov 2017 07:01  -  12 Nov 2017 07:00  --------------------------------------------------------  IN: 480 mL / OUT: 0 mL / NET: 480 mL    12 Nov 2017 07:01  -  12 Nov 2017 18:46  --------------------------------------------------------  IN: 480 mL / OUT: 200 mL / NET: 280 mL    CAPILLARY BLOOD GLUCOSE  157 (11 Nov 2017 20:49)    PHYSICAL EXAM:  GENERAL: NAD, well-groomed  HEENT: PERRL, +EOMI, anicteric, no [place]  NECK: Supple, No JVD   CHEST/LUNG: CTA bilaterally; Normal effort  HEART: S1S2 Normal intensity, no murmurs, gallops or rubs noted  ABDOMEN/: Soft, BS Normoactive, NT, ND, no HSM noted; + erythema at tip of penis, unable to retract foreskin, adhesions noted  EXTREMITIES:  2+ radial and DP pulses noted, no clubbing, cyanosis, or edema noted, FROM x 4  SKIN: No rashes or lesions noted  NEURO: A&Ox3, no focal deficits noted, CN II-XII intact  PSYCH: normal mood and affect; insight/judgement appropriate    LABS:    11-11    139  |  107  |  56.0<H>  ----------------------------<  171<H>  4.2   |  17.0<L>  |  2.67<H>    Ca    8.3<L>      11 Nov 2017 05:34  Mg     1.8     11-11          RADIOLOGY & ADDITIONAL TESTS:    MEDICATIONS:  MEDICATIONS  (STANDING):  amLODIPine   Tablet 10 milliGRAM(s) Oral daily  aspirin 325 milliGRAM(s) Oral daily  atorvastatin 40 milliGRAM(s) Oral at bedtime  dextrose 5%. 1000 milliLiter(s) (50 mL/Hr) IV Continuous <Continuous>  dextrose 50% Injectable 12.5 Gram(s) IV Push once  dextrose 50% Injectable 25 Gram(s) IV Push once  dextrose 50% Injectable 25 Gram(s) IV Push once  enoxaparin Injectable 30 milliGRAM(s) SubCutaneous daily  insulin glargine Injectable (LANTUS) 7 Unit(s) SubCutaneous at bedtime  insulin lispro (HumaLOG) corrective regimen sliding scale   SubCutaneous three times a day before meals  lactobacillus acidophilus 1 Tablet(s) Oral two times a day with meals  metoprolol     tartrate 50 milliGRAM(s) Oral two times a day  pantoprazole    Tablet 40 milliGRAM(s) Oral before breakfast  tamsulosin 0.4 milliGRAM(s) Oral at bedtime  vancomycin    Solution 125 milliGRAM(s) Oral every 6 hours    MEDICATIONS  (PRN):  acetaminophen   Tablet. 650 milliGRAM(s) Oral every 6 hours PRN Mild Pain (1 - 3)  dextrose Gel 1 Dose(s) Oral once PRN Blood Glucose LESS THAN 70 milliGRAM(s)/deciliter  glucagon  Injectable 1 milliGRAM(s) IntraMuscular once PRN Glucose LESS THAN 70 milligrams/deciliter  hydrALAZINE Injectable 10 milliGRAM(s) IV Push every 4 hours PRN forsbp above 160 mmhg  HYDROcodone/homatropine Syrup 5 milliLiter(s) Oral every 6 hours PRN Cough

## 2017-11-13 LAB
GLUCOSE BLDC GLUCOMTR-MCNC: 171 MG/DL — HIGH (ref 70–99)
GLUCOSE BLDC GLUCOMTR-MCNC: 189 MG/DL — HIGH (ref 70–99)
GLUCOSE BLDC GLUCOMTR-MCNC: 228 MG/DL — HIGH (ref 70–99)
GLUCOSE BLDC GLUCOMTR-MCNC: 288 MG/DL — HIGH (ref 70–99)

## 2017-11-13 PROCEDURE — 99233 SBSQ HOSP IP/OBS HIGH 50: CPT

## 2017-11-13 RX ORDER — INFLUENZA VIRUS VACCINE 15; 15; 15; 15 UG/.5ML; UG/.5ML; UG/.5ML; UG/.5ML
0.5 SUSPENSION INTRAMUSCULAR ONCE
Qty: 0 | Refills: 0 | Status: COMPLETED | OUTPATIENT
Start: 2017-11-13 | End: 2017-11-13

## 2017-11-13 RX ORDER — NYSTATIN/TRIAMCINOLONE ACET
1 OINTMENT (GRAM) TOPICAL
Qty: 0 | Refills: 0 | Status: DISCONTINUED | OUTPATIENT
Start: 2017-11-13 | End: 2017-11-16

## 2017-11-13 RX ADMIN — Medication 1 TABLET(S): at 17:43

## 2017-11-13 RX ADMIN — Medication 125 MILLIGRAM(S): at 12:32

## 2017-11-13 RX ADMIN — Medication 50 MILLIGRAM(S): at 05:44

## 2017-11-13 RX ADMIN — Medication 2: at 17:43

## 2017-11-13 RX ADMIN — ATORVASTATIN CALCIUM 40 MILLIGRAM(S): 80 TABLET, FILM COATED ORAL at 21:20

## 2017-11-13 RX ADMIN — Medication 125 MILLIGRAM(S): at 23:32

## 2017-11-13 RX ADMIN — ENOXAPARIN SODIUM 30 MILLIGRAM(S): 100 INJECTION SUBCUTANEOUS at 21:20

## 2017-11-13 RX ADMIN — Medication 650 MILLIGRAM(S): at 13:29

## 2017-11-13 RX ADMIN — PANTOPRAZOLE SODIUM 40 MILLIGRAM(S): 20 TABLET, DELAYED RELEASE ORAL at 05:44

## 2017-11-13 RX ADMIN — Medication 325 MILLIGRAM(S): at 12:31

## 2017-11-13 RX ADMIN — INSULIN GLARGINE 7 UNIT(S): 100 INJECTION, SOLUTION SUBCUTANEOUS at 21:21

## 2017-11-13 RX ADMIN — Medication 650 MILLIGRAM(S): at 14:21

## 2017-11-13 RX ADMIN — Medication 125 MILLIGRAM(S): at 17:43

## 2017-11-13 RX ADMIN — Medication 50 MILLIGRAM(S): at 17:43

## 2017-11-13 RX ADMIN — Medication 1 APPLICATION(S): at 19:21

## 2017-11-13 RX ADMIN — Medication 1 TABLET(S): at 10:09

## 2017-11-13 RX ADMIN — INFLUENZA VIRUS VACCINE 0.5 MILLILITER(S): 15; 15; 15; 15 SUSPENSION INTRAMUSCULAR at 21:21

## 2017-11-13 RX ADMIN — TAMSULOSIN HYDROCHLORIDE 0.4 MILLIGRAM(S): 0.4 CAPSULE ORAL at 21:20

## 2017-11-13 RX ADMIN — Medication 125 MILLIGRAM(S): at 05:44

## 2017-11-13 RX ADMIN — AMLODIPINE BESYLATE 10 MILLIGRAM(S): 2.5 TABLET ORAL at 05:44

## 2017-11-13 RX ADMIN — Medication 3: at 12:32

## 2017-11-13 RX ADMIN — Medication: at 08:15

## 2017-11-13 NOTE — CONSULT NOTE ADULT - ASSESSMENT
1) UTI  According to the chart, this was treated.  Re-culture urine & treat with appropriate antibiotic    2) Phimosis & Posthitis  Pt to wash tip of foreskin with mild soap & water 2X/day & then apply mycolog cream II afterwards X 3 weeks    F/U in the office in 2 weeks    Thank You.
83M with above issues, no vascular intervention required at this time    1. Given recent duplex revealing patency of bypass and sufficient perfusion in the face of palpable pulses, no intervention required.   2. Would recommend podiatry consult and local wound care for ulcers  3. Tight glucose control to promote wound healing  4. Will sign off, reconsult as necessary   5. Seen and discussed with Dr. Lopez

## 2017-11-13 NOTE — PROGRESS NOTE ADULT - SUBJECTIVE AND OBJECTIVE BOX
[facility]#[id number]    CHIEF COMPLAINT/INTERVAL HISTORY:  Pt. seen and evaluated for C. diff diarrhea.  Pt. is in no distress.  Reports no more diarrhea.  Tolerating oral vancomycin.  Reports having burning on the tip of his penis when he urinates.      REVIEW OF SYSTEMS:  No fever, CP, SOB, or abdominal pain.      Vital Signs Last 24 Hrs  T(C): 36.4 (12 Nov 2017 23:10), Max: 36.9 (12 Nov 2017 18:54)  T(F): 97.6 (12 Nov 2017 23:10), Max: 98.4 (12 Nov 2017 18:54)  HR: 67 (13 Nov 2017 05:42) (63 - 70)  BP: 140/62 (13 Nov 2017 05:42) (110/56 - 157/67)  BP(mean): --  RR: 18 (12 Nov 2017 23:10) (16 - 18)  SpO2: 100% (12 Nov 2017 23:10) (97% - 100%)    PHYSICAL EXAM:  GENERAL: NAD  HEENT: EOMI, hearing normal, conjunctiva and sclera clear  Chest: CTA bilaterally, no wheezing  CV: S1S2, RRR,   GI: soft, +BS, NT/ND  : erythema of tip of penis  Musculoskeletal: no edema  Psychiatric: affect nL, mood nL  Skin: warm and dry      Assessment and Plan:  -C. diff diarrhea: continue Vancomycin 125mg PO q6h Day #4 of 14.  Continue lactobacillus PO BID.    -UTI: +proteus.  Completed course of antibiotics.  -DM: continue lantus 7units SQ QHS and humalog sliding scale  -HTN: continue metoprolol , norvasc, and IV hydralazine PRN.  -CKD: stable.  Avoid nephrotoxic agents.  -BPH: continue flomax  -Phimosis:  awaiting urology consult.  -VTE ppx: lovenox 30mg SQ daily.  -Awaiting emergency medicaid and placement to [facility].

## 2017-11-14 LAB
GLUCOSE BLDC GLUCOMTR-MCNC: 191 MG/DL — HIGH (ref 70–99)
GLUCOSE BLDC GLUCOMTR-MCNC: 194 MG/DL — HIGH (ref 70–99)
GLUCOSE BLDC GLUCOMTR-MCNC: 251 MG/DL — HIGH (ref 70–99)
GLUCOSE BLDC GLUCOMTR-MCNC: 339 MG/DL — HIGH (ref 70–99)
GRAM STN FLD: SIGNIFICANT CHANGE UP
SPECIMEN SOURCE: SIGNIFICANT CHANGE UP

## 2017-11-14 PROCEDURE — 99233 SBSQ HOSP IP/OBS HIGH 50: CPT

## 2017-11-14 PROCEDURE — 99221 1ST HOSP IP/OBS SF/LOW 40: CPT

## 2017-11-14 PROCEDURE — 93923 UPR/LXTR ART STDY 3+ LVLS: CPT | Mod: 26

## 2017-11-14 RX ADMIN — Medication 1 APPLICATION(S): at 17:14

## 2017-11-14 RX ADMIN — TAMSULOSIN HYDROCHLORIDE 0.4 MILLIGRAM(S): 0.4 CAPSULE ORAL at 22:12

## 2017-11-14 RX ADMIN — Medication 650 MILLIGRAM(S): at 22:13

## 2017-11-14 RX ADMIN — Medication 1 APPLICATION(S): at 05:42

## 2017-11-14 RX ADMIN — Medication 125 MILLIGRAM(S): at 05:42

## 2017-11-14 RX ADMIN — Medication 1 TABLET(S): at 07:42

## 2017-11-14 RX ADMIN — INSULIN GLARGINE 7 UNIT(S): 100 INJECTION, SOLUTION SUBCUTANEOUS at 22:12

## 2017-11-14 RX ADMIN — AMLODIPINE BESYLATE 10 MILLIGRAM(S): 2.5 TABLET ORAL at 05:42

## 2017-11-14 RX ADMIN — Medication 650 MILLIGRAM(S): at 22:43

## 2017-11-14 RX ADMIN — ATORVASTATIN CALCIUM 40 MILLIGRAM(S): 80 TABLET, FILM COATED ORAL at 22:12

## 2017-11-14 RX ADMIN — Medication 325 MILLIGRAM(S): at 12:10

## 2017-11-14 RX ADMIN — Medication 1 TABLET(S): at 17:13

## 2017-11-14 RX ADMIN — Medication 50 MILLIGRAM(S): at 17:13

## 2017-11-14 RX ADMIN — Medication 1: at 08:00

## 2017-11-14 RX ADMIN — Medication 3: at 17:15

## 2017-11-14 RX ADMIN — Medication 50 MILLIGRAM(S): at 05:42

## 2017-11-14 RX ADMIN — PANTOPRAZOLE SODIUM 40 MILLIGRAM(S): 20 TABLET, DELAYED RELEASE ORAL at 05:42

## 2017-11-14 RX ADMIN — Medication 125 MILLIGRAM(S): at 12:10

## 2017-11-14 RX ADMIN — Medication 4: at 12:09

## 2017-11-14 RX ADMIN — Medication 125 MILLIGRAM(S): at 17:13

## 2017-11-14 NOTE — PROGRESS NOTE ADULT - SUBJECTIVE AND OBJECTIVE BOX
CHIEF COMPLAINT/INTERVAL HISTORY:  Pt. seen and evaluated for C. diff diarrhea.  Pt. reports not having any more diarrhea x 2 days.  Pt. does report having R. sided chest pain with movement of his torso.  Reports that penile pain is improved.      REVIEW OF SYSTEMS:  No fever, SOB, abdominal pain.    Vital Signs Last 24 Hrs  T(C): 37.3 (13 Nov 2017 23:19), Max: 37.3 (13 Nov 2017 23:19)  T(F): 99.1 (13 Nov 2017 23:19), Max: 99.1 (13 Nov 2017 23:19)  HR: 80 (13 Nov 2017 23:19) (67 - 80)  BP: 147/67 (13 Nov 2017 23:19) (142/70 - 150/72)  BP(mean): --  RR: 18 (13 Nov 2017 23:19) (18 - 18)  SpO2: 97% (13 Nov 2017 23:19) (97% - 100%)    PHYSICAL EXAM:  GENERAL: NAD  HEENT: EOMI, hearing normal, conjunctiva and sclera clear  Chest: CTA bilaterally, no wheezing, R. sided chest pain reproducible on palpation  CV: S1S2, RRR,   GI: soft, +BS, NT/ND  Musculoskeletal: no edema, L. TMA.  healed ulcerations of the feet  Psychiatric: affect nL, mood nL  Skin: warm and dry    Assessment and Plan:  -C. diff diarrhea: continue Vancomycin 125mg PO q6h Day #5 of 14.  Continue lactobacillus PO BID.    -UTI: +proteus.  Completed course of antibiotics.  -DM: continue lantus 7units SQ QHS and humalog sliding scale  -HTN: continue metoprolol , norvasc, and IV hydralazine PRN.  -CKD: stable.  Avoid nephrotoxic agents.  -BPH: continue flomax  -Phimosis:  Appreciated urology consult.  -R. sided chest pain:  reproducible on palpation.  Occurs with motion, likely musculoskeletal.  Tylenol PRN.    -VTE ppx: lovenox 30mg SQ daily.  -Awaiting emergency medicaid and placement to [facility]

## 2017-11-14 NOTE — CONSULT NOTE ADULT - SUBJECTIVE AND OBJECTIVE BOX
Patient is a 83y old  Male who presents with a chief complaint of Abdominal pain (06 Nov 2017 10:28)    Urology Consultation requested for phimosis.      HPI:  84 y/o male with h/o DM II, HTN, PVD, CRI, pacemaker  who was recently moved from [place], started to have generalized abdominal pain and diarrhea 1 day ago. no fever. no nausea or vomiting. no blood in the stools. symptoms resolved spontaneously today. patient also c/o SOB on walking. as per ER notes, Daughter added that the patient is bed for the last 2 weeks feeling weak and had multiple falls. Labs shows Glu: 312 mg/dl (03 Nov 2017 01:46)    Pt has not been able to retract the foreskin for many years.      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 No nausea, vomiting or hematemesis; No diarrhea or constipation. No melena or hematochezia.  Genitourinary: No dysuria, frequency, hematuria or incontinence  Rectal: No pain, hemorrhoids or incontinence  Neurological: No headaches, memory loss, loss of strength, numbness or tremors  Skin: No itching, burning, rashes or lesions   Lymph Nodes: No enlarged glands  Musculoskeletal: No joint pain or swelling; No muscle, back or extremity pain  Psychiatric: No depression, anxiety, mood swings or difficulty sleeping  Heme/Lymph: No easy bruising or bleeding gums  Allergy and Immunologic: No hives or eczema    MEDICATIONS  (STANDING):  amLODIPine   Tablet 10 milliGRAM(s) Oral daily  aspirin 325 milliGRAM(s) Oral daily  atorvastatin 40 milliGRAM(s) Oral at bedtime  dextrose 5%. 1000 milliLiter(s) (50 mL/Hr) IV Continuous <Continuous>  dextrose 50% Injectable 12.5 Gram(s) IV Push once  dextrose 50% Injectable 25 Gram(s) IV Push once  dextrose 50% Injectable 25 Gram(s) IV Push once  enoxaparin Injectable 30 milliGRAM(s) SubCutaneous daily  insulin glargine Injectable (LANTUS) 7 Unit(s) SubCutaneous at bedtime  insulin lispro (HumaLOG) corrective regimen sliding scale   SubCutaneous three times a day before meals  lactobacillus acidophilus 1 Tablet(s) Oral two times a day with meals  metoprolol     tartrate 50 milliGRAM(s) Oral two times a day  pantoprazole    Tablet 40 milliGRAM(s) Oral before breakfast  tamsulosin 0.4 milliGRAM(s) Oral at bedtime  vancomycin    Solution 125 milliGRAM(s) Oral every 6 hours    MEDICATIONS  (PRN):  acetaminophen   Tablet. 650 milliGRAM(s) Oral every 6 hours PRN Mild Pain (1 - 3)  dextrose Gel 1 Dose(s) Oral once PRN Blood Glucose LESS THAN 70 milliGRAM(s)/deciliter  glucagon  Injectable 1 milliGRAM(s) IntraMuscular once PRN Glucose LESS THAN 70 milligrams/deciliter  hydrALAZINE Injectable 10 milliGRAM(s) IV Push every 4 hours PRN forsbp above 160 mmhg  HYDROcodone/homatropine Syrup 5 milliLiter(s) Oral every 6 hours PRN Cough      Allergies    No Known Allergies    Intolerances        SOCIAL HISTORY:    FAMILY HISTORY:  No pertinent family history in first degree relatives      Vital Signs Last 24 Hrs  T(C): 36.9 (13 Nov 2017 09:49), Max: 36.9 (12 Nov 2017 18:54)  T(F): 98.5 (13 Nov 2017 09:49), Max: 98.5 (13 Nov 2017 09:49)  HR: 67 (13 Nov 2017 09:49) (64 - 69)  BP: 150/72 (13 Nov 2017 09:49) (112/62 - 157/67)  BP(mean): --  RR: 18 (13 Nov 2017 09:49) (16 - 18)  SpO2: 97% (13 Nov 2017 09:49) (97% - 100%)    PHYSICAL EXAM:    General: Well developed; well nourished; in no acute distress  Head: Normocephalic; atraumatic  Respiratory: No wheezes, rales or rhonchi  Cardiovascular: Regular rate and rhythm. S1 and S2 Normal; No murmurs, gallops or rubs  Gastrointestinal: Soft non-tender non-distended; Normal bowel sounds; No hepatosplenomegaly  Genitourinary: No costovertebral angle tenderness.  Urinary bladder is clinically not distended  Penis : uncircumcised; prepuce not retractable  Scrotum : nl  Testes : no masses or tenderness  Extremities: Normal range of motion, No clubbing, cyanosis or edema  Skin: Warm and dry. No acute rash        Comprehensive Metabolic Panel (10.23.17 @ 18:13)    Sodium, Serum: 140 mmol/L    Potassium, Serum: 4.4 mmol/L    Chloride, Serum: 101 mmol/L    Carbon Dioxide, Serum: 26.0 mmol/L    Anion Gap, Serum: 13 mmol/L    Blood Urea Nitrogen, Serum: 27.0 mg/dL    Creatinine, Serum: 2.10 mg/dL    Glucose, Serum: 204 mg/dL    Calcium, Total Serum: 8.4 mg/dL    Protein Total, Serum: 6.9 g/dL    Albumin, Serum: 3.4 g/dL    Bilirubin Total, Serum: 0.1 mg/dL    Alkaline Phosphatase, Serum: 92 U/L    Aspartate Aminotransferase (AST/SGOT): 12 U/L    Alanine Aminotransferase (ALT/SGPT): 12 U/L    eGFR if Non : 28: Interpretative comment  The units for eGFR are ml/min/1.73m2 (normalized body surface area). The  eGFR is calculated from a serum creatinine using the CKD-EPI equation.  Other variables required for calculation are race, age and sex. Among  patients with chronic kidney disease (CKD), the eGFR is useful in  determining the stage of disease according to KDOQI CKD classification.  All eGFR results are reported numerically with the following  interpretation.          GFR                    With                 Without     (ml/min/1.73 m2)    Kidney Damage       Kidney Damage        >= 90                    Stage 1                     Normal        60-89                    Stage 2                     Decreased GFR        30-59     Stage 3                     Stage 3        15-29                    Stage 4                     Stage 4        < 15                      Stage 5                     Stage 5  Each stage of CKD assumes that the associated GFR level has been in  effect for at least 3 months. Determination of stages one and two (with  eGFR > 59 ml/min/m2) requires estimation of kidney damage for at least 3  months as defined by structural or functional abnormalities.  Limitations: All estimates of GFR will be less accurate for patients at  extremes of muscle mass (including but not limited to frail elderly,  critically ill, or cancer patients), those with unusual diets, and those  with conditions associated with reduced secretion or extrarenal  elimination of creatinine. The eGFR equation is not recommended for use  in patients with unstable creatinine levels. mL/min/1.73M2    eGFR if African American: 33 mL/min/1.73M2    LABS:  Culture - Urine (11.05.17 @ 03:08)    -  Amikacin: S <=16    -  Ampicillin: S <=8    -  Ampicillin/Sulbactam: S <=8/4    -  Aztreonam: S <=4    -  Cefazolin: S <=8    -  Cefepime: S <=4    -  Cefoxitin: S <=8    -  Ceftazidime: S <=1    -  Ceftriaxone: S <=1    -  Ciprofloxacin: S <=1    -  Ertapenem: S <=1    -  Gentamicin: S <=4    -  Levofloxacin: S <=2    -  Meropenem: S <=1    -  Nitrofurantoin: R >64    -  Piperacillin/Tazobactam: S <=16    -  Tobramycin: S <=4    -  Trimethoprim/Sulfamethoxazole: S <=2/38    Specimen Source: .Urine Clean Catch (Midstream)    Culture Results:   >100,000 CFU/ml Proteus mirabilis    Organism Identification: Proteus mirabilis    Organism: Proteus mirabilis    Method Type: WARREN    Complete Blood Count in AM (11.10.17 @ 06:08)    WBC Count: 11.8 K/uL    RBC Count: 3.52 M/uL    Hemoglobin: 10.4 g/dL    Hematocrit: 30.8 %    Mean Cell Volume: 87.5 fl    Mean Cell Hemoglobin: 29.5 pg    Mean Cell Hemoglobin Conc: 33.8 g/dL    Red Cell Distrib Width: 16.2 %    Platelet Count - Automated: 336 K/uL                    RADIOLOGY & ADDITIONAL STUDIES:
82 yo male with PMHX of DM, PVD, CRI, pacemaker seen at bedside for left foot stable TMA ulcer. Pt is NAD and AAOX 3. Patient is a [place]ian speaker and poor historian. Pt is admitted for generalized abdominal pain and diarrhea. PT denies N/V/C/F. Pt is diabetic.     HPI:  84 y/o male with h/o DM II, HTN, PVD, CRI, pacemaker  who was recently moved from [place], started to have generalized abdominal pain and diarrhea 1 day ago. no fever. no nausea or vomiting. no blood in the stools. symptoms resolved spontaneously today. patient also c/o SOB on walking. as per ER notes, Daughter added that the patient is bed for the last 2 weeks feeling weak and had multiple falls. 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 650 milliGRAM(s) Oral every 6 hours PRN  amLODIPine   Tablet 10 milliGRAM(s) Oral daily  aspirin 325 milliGRAM(s) Oral daily  atorvastatin 40 milliGRAM(s) Oral at bedtime  dextrose 5%. 1000 milliLiter(s) IV Continuous <Continuous>  dextrose 50% Injectable 12.5 Gram(s) IV Push once  dextrose 50% Injectable 25 Gram(s) IV Push once  dextrose 50% Injectable 25 Gram(s) IV Push once  dextrose Gel 1 Dose(s) Oral once PRN  enoxaparin Injectable 30 milliGRAM(s) SubCutaneous daily  glucagon  Injectable 1 milliGRAM(s) IntraMuscular once PRN  hydrALAZINE Injectable 10 milliGRAM(s) IV Push every 4 hours PRN  HYDROcodone/homatropine Syrup 5 milliLiter(s) Oral every 6 hours PRN  insulin glargine Injectable (LANTUS) 7 Unit(s) SubCutaneous at bedtime  insulin lispro (HumaLOG) corrective regimen sliding scale   SubCutaneous three times a day before meals  lactobacillus acidophilus 1 Tablet(s) Oral two times a day with meals  metoprolol     tartrate 50 milliGRAM(s) Oral two times a day  nystatin/triamcinolone Cream 1 Application(s) Topical two times a day  pantoprazole    Tablet 40 milliGRAM(s) Oral before breakfast  tamsulosin 0.4 milliGRAM(s) Oral at bedtime  vancomycin    Solution 125 milliGRAM(s) Oral every 6 hours      SOCIAL HISTORY:  Smoker:      FAMILY HISTORY:  No pertinent family history in first degree relatives      VITALS:  Vital Signs Last 24 Hrs  T(C): 37.1 (14 Nov 2017 08:03), Max: 37.3 (13 Nov 2017 23:19)  T(F): 98.8 (14 Nov 2017 08:03), Max: 99.1 (13 Nov 2017 23:19)  HR: 76 (14 Nov 2017 08:03) (70 - 80)  BP: 140/70 (14 Nov 2017 08:03) (140/70 - 147/67)  BP(mean): --  RR: 18 (14 Nov 2017 08:03) (18 - 18)  SpO2: 98% (14 Nov 2017 08:03) (97% - 100%)    LABS/DIAGNOSTIC TESTS:                    ABG -     CULTURES:   .Urine Clean Catch (Midstream)  11-05 @ 03:08   >100,000 CFU/ml Proteus mirabilis  --  Proteus mirabilis      .Blood Blood  10-23 @ 18:15   No growth at 5 days.  --  --          RADIOLOGY:    < from: Xray Foot AP + Lateral, Bilateral (11.03.17 @ 19:04) >    FINDINGS: Redemonstration of partial left foot amputation. Stable   overlying soft tissue swelling. No periosteal reaction is seen. Bilateral   plantar calcaneal spurs noted. Vascular calcifications in the soft   tissues.     IMPRESSION: No radiographic evidence of osteomyelitis.    < end of copied text >        PE: Left Foot   Vascular: DP/PT: non palpable; CFT< none; TG: wnl; no edema noted  Derm: stable fibrotic ulceration noted on the lateral aspect of Left foot TMA; mild serous drainage noted; no pus noted; stable wound; no clinical sign of infection; no erythema; no mal odor  NEuro: Unable to assess     A: Left Foot TMA with Stable Lateral Ulceration at stump site     P:  Patient evalauted and chart reviewed  Xray ordered; results reviewed noted as above  AWILDA ordered; results pending  Cx ordered; results pending   Recommend Vascular and ID consult.  Continue IV abx as per ID recommendation.  Monitor WBC  Excisional debridement of the lateral stump ulceration using # 15 blade to subcutaneous tissue level. Pt tolerated procedure well  DSD applied  Keep dressing clean, dry and intact to the left foot   Pt is podiatrically stable for discharge if medically stable. Pt will follow up with Podiatry/Dr. Jain for further wound care as outpatient.  Podiatry will follow patient in house    Wound Care Dressing Orders  1. Remove old dressing  2. Apply gauze  3. Wrap with kerlix  4. Keep dressing clean, dry, and intact to the left foot  5. Follow up with Dr. Jain as outpatient for further wound care.
HPI:  82 y/o male with h/o DM II, HTN, PVD, CRI, pacemaker  who was recently moved from [place], started to have generalized abdominal pain and diarrhea 1 day ago. no fever. no nausea or vomiting. no blood in the stools. symptoms resolved spontaneously today. patient also c/o SOB on walking. as per ER notes, Daughter added that the patient is bed for the last 2 weeks feeling weak and had multiple falls. Labs shows Glu: 312 mg/dl (03 Nov 2017 01:46)    Called to evaluate patient with longstanding PVD and h/o LLE bypass and TMA presenting with L foot pain and b/l ulcers.     MEDICATIONS  (STANDING):  amLODIPine   Tablet 10 milliGRAM(s) Oral daily  aspirin  chewable 81 milliGRAM(s) Oral daily  atorvastatin 10 milliGRAM(s) Oral at bedtime  dextrose 5%. 1000 milliLiter(s) (50 mL/Hr) IV Continuous <Continuous>  dextrose 50% Injectable 12.5 Gram(s) IV Push once  dextrose 50% Injectable 25 Gram(s) IV Push once  dextrose 50% Injectable 25 Gram(s) IV Push once  enoxaparin Injectable 30 milliGRAM(s) SubCutaneous daily  insulin glargine Injectable (LANTUS) 10 Unit(s) SubCutaneous every morning  insulin lispro (HumaLOG) corrective regimen sliding scale   SubCutaneous three times a day before meals  metoprolol     tartrate 25 milliGRAM(s) Oral two times a day  pantoprazole    Tablet 40 milliGRAM(s) Oral before breakfast  tamsulosin 0.4 milliGRAM(s) Oral at bedtime    MEDICATIONS  (PRN):  dextrose Gel 1 Dose(s) Oral once PRN Blood Glucose LESS THAN 70 milliGRAM(s)/deciliter  glucagon  Injectable 1 milliGRAM(s) IntraMuscular once PRN Glucose LESS THAN 70 milligrams/deciliter  hydrALAZINE Injectable 10 milliGRAM(s) IV Push every 4 hours PRN forsbp above 160 mmhg    Allergies    No Known Allergies    Intolerances    PAST MEDICAL & SURGICAL HISTORY:  Pacemaker  PVD (peripheral vascular disease)  CRI (chronic renal insufficiency), stage 3 (moderate)  HTN (hypertension)  Diabetes  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      Vital Signs Last 24 Hrs  T(C): 36.8 (03 Nov 2017 09:58), Max: 37.2 (03 Nov 2017 07:13)  T(F): 98.2 (03 Nov 2017 09:58), Max: 98.9 (03 Nov 2017 07:13)  HR: 98 (03 Nov 2017 09:58) (60 - 98)  BP: 143/68 (03 Nov 2017 09:58) (142/62 - 179/78)  BP(mean): --  RR: 18 (03 Nov 2017 09:58) (18 - 20)  SpO2: 99% (03 Nov 2017 09:07) (98% - 100%)    PHYSICAL EXAM:      Constitutional: AOx3, NAD  Extremities: b/l LE with ulcerations, L TMA site C/D/I with exception of 2cm dry ulceration to lateral aspect  RLE with 3cm dry ulceration to 1st hallux  WALTERS, WWP b/l  Vacular: Pedal pulses 2+ palpable    Radiology:  The BILATERAL lower extremity arterial system demonstrated patency upon  Doppler interrogation. The LEFT graft is intact. The velocities within the  LEFT lower extremity are within normal limits with mild to moderate  elevation within the distal common femoral artery and proximal superficial  femoral artery.    The RIGHT lower extremities demonstrate elevation of velocity in the mid  superficial femoral artery measuring 231.5 cm/s. The remaining velocities  are within normal limits.      IMPRESSION: Mild to moderate elevation of velocities within the LEFT distal  common femoral and proximal superficial femoral arteries. Marked velocity  elevation in the mid RIGHT superficial femoral artery.

## 2017-11-15 DIAGNOSIS — A04.72 ENTEROCOLITIS DUE TO CLOSTRIDIUM DIFFICILE, NOT SPECIFIED AS RECURRENT: ICD-10-CM

## 2017-11-15 DIAGNOSIS — N47.1 PHIMOSIS: ICD-10-CM

## 2017-11-15 DIAGNOSIS — N40.0 BENIGN PROSTATIC HYPERPLASIA WITHOUT LOWER URINARY TRACT SYMPTOMS: ICD-10-CM

## 2017-11-15 DIAGNOSIS — N39.0 URINARY TRACT INFECTION, SITE NOT SPECIFIED: ICD-10-CM

## 2017-11-15 LAB
C DIFF BY PCR RESULT: DETECTED
C DIFF TOX GENS STL QL NAA+PROBE: SIGNIFICANT CHANGE UP
GLUCOSE BLDC GLUCOMTR-MCNC: 231 MG/DL — HIGH (ref 70–99)
GLUCOSE BLDC GLUCOMTR-MCNC: 232 MG/DL — HIGH (ref 70–99)
GLUCOSE BLDC GLUCOMTR-MCNC: 252 MG/DL — HIGH (ref 70–99)
GLUCOSE BLDC GLUCOMTR-MCNC: 253 MG/DL — HIGH (ref 70–99)
HCT VFR BLD CALC: 31.1 % — LOW (ref 42–52)
HGB BLD-MCNC: 10.3 G/DL — LOW (ref 14–18)
LACTATE SERPL-SCNC: 1.1 MMOL/L — SIGNIFICANT CHANGE UP (ref 0.5–2)
MCHC RBC-ENTMCNC: 29.4 PG — SIGNIFICANT CHANGE UP (ref 27–31)
MCHC RBC-ENTMCNC: 33.1 G/DL — SIGNIFICANT CHANGE UP (ref 32–36)
MCV RBC AUTO: 88.9 FL — SIGNIFICANT CHANGE UP (ref 80–94)
NT-PROBNP SERPL-SCNC: HIGH PG/ML (ref 0–300)
PLATELET # BLD AUTO: 385 K/UL — SIGNIFICANT CHANGE UP (ref 150–400)
RBC # BLD: 3.5 M/UL — LOW (ref 4.6–6.2)
RBC # FLD: 15.8 % — HIGH (ref 11–15.6)
WBC # BLD: 16.8 K/UL — HIGH (ref 4.8–10.8)
WBC # FLD AUTO: 16.8 K/UL — HIGH (ref 4.8–10.8)

## 2017-11-15 PROCEDURE — 99233 SBSQ HOSP IP/OBS HIGH 50: CPT

## 2017-11-15 PROCEDURE — 71010: CPT | Mod: 26

## 2017-11-15 PROCEDURE — 93010 ELECTROCARDIOGRAM REPORT: CPT

## 2017-11-15 RX ORDER — ACETAMINOPHEN 500 MG
650 TABLET ORAL ONCE
Qty: 0 | Refills: 0 | Status: COMPLETED | OUTPATIENT
Start: 2017-11-15 | End: 2017-11-15

## 2017-11-15 RX ADMIN — Medication 1 TABLET(S): at 08:29

## 2017-11-15 RX ADMIN — Medication 1 APPLICATION(S): at 17:34

## 2017-11-15 RX ADMIN — ENOXAPARIN SODIUM 30 MILLIGRAM(S): 100 INJECTION SUBCUTANEOUS at 11:37

## 2017-11-15 RX ADMIN — Medication 650 MILLIGRAM(S): at 05:22

## 2017-11-15 RX ADMIN — Medication 2: at 08:29

## 2017-11-15 RX ADMIN — Medication 125 MILLIGRAM(S): at 11:38

## 2017-11-15 RX ADMIN — AMLODIPINE BESYLATE 10 MILLIGRAM(S): 2.5 TABLET ORAL at 06:08

## 2017-11-15 RX ADMIN — Medication 125 MILLIGRAM(S): at 05:19

## 2017-11-15 RX ADMIN — Medication 125 MILLIGRAM(S): at 21:51

## 2017-11-15 RX ADMIN — TAMSULOSIN HYDROCHLORIDE 0.4 MILLIGRAM(S): 0.4 CAPSULE ORAL at 21:50

## 2017-11-15 RX ADMIN — Medication 1 TABLET(S): at 17:34

## 2017-11-15 RX ADMIN — Medication 50 MILLIGRAM(S): at 05:19

## 2017-11-15 RX ADMIN — Medication 325 MILLIGRAM(S): at 11:38

## 2017-11-15 RX ADMIN — Medication 125 MILLIGRAM(S): at 01:10

## 2017-11-15 RX ADMIN — ATORVASTATIN CALCIUM 40 MILLIGRAM(S): 80 TABLET, FILM COATED ORAL at 21:50

## 2017-11-15 RX ADMIN — Medication: at 17:34

## 2017-11-15 RX ADMIN — Medication 50 MILLIGRAM(S): at 17:34

## 2017-11-15 RX ADMIN — PANTOPRAZOLE SODIUM 40 MILLIGRAM(S): 20 TABLET, DELAYED RELEASE ORAL at 05:19

## 2017-11-15 RX ADMIN — Medication 125 MILLIGRAM(S): at 17:34

## 2017-11-15 RX ADMIN — INSULIN GLARGINE 7 UNIT(S): 100 INJECTION, SOLUTION SUBCUTANEOUS at 21:50

## 2017-11-15 RX ADMIN — Medication 2: at 11:37

## 2017-11-15 RX ADMIN — Medication 1 APPLICATION(S): at 11:30

## 2017-11-15 NOTE — CHART NOTE - NSCHARTNOTEFT_GEN_A_CORE
Called by RN to assess pt c/o reproducible right upper anterior chest pain non radiating for past 2 days.  Pain exacerbated with cough and deep breathing.  Denies SOB, n, v, fever, chills.  pt ex smoker +cough, not bringing up phlegm.    VS  137/68  80  22 100.4 orally 98% on RA    General     Pt is alert and Ox3 in NAD  Lungs        fair air entry b/l; decreased breath sounds in lower fields  Heart        s1/s2  Abdomen  soft nt +bs  ext            left foot wound clean, no drainage    Fever  Right Anterior Chest pain, likely costochondritis r/o pneumonia    CBC; Blood culturesx2; lactate; UA; Urine C&S; CXR portable stat  BNP  Tylenol 650 mg po stat

## 2017-11-15 NOTE — CHART NOTE - NSCHARTNOTEFT_GEN_A_CORE
EKG with no acute changes from patient baseline in last EKG. Continue to monitor and notify provider of any changes in patient status. EKG with no acute changes from patient baseline in last EKG. Pt VSS T 98.6 orally, /58, P 73, RR 18, O2 sat 98% on RA, no complaints at this time. Continue to monitor and notify provider of any changes in patient status.

## 2017-11-15 NOTE — PROGRESS NOTE ADULT - SUBJECTIVE AND OBJECTIVE BOX
JUAN MIGUEL PARADA    [id number]    83y      Male    INTERVAL HPI/OVERNIGHT EVENTS: Was febrile this morning. Via bedside  [place]. Started to have more diarrhea. States that he feels very tired.    Hospital course:  82 yo M with h/o DM2, PVD, HTN, CRI, pacemaker recently moved from [place] presented with abdominal pain. Found to have proteus UTI and treated with augmentin. Was C. diff positive and started on vancomycin. Daughter added that the patient has been getting progressively weaker and has been having multiple falls. PT recommending KYLEE. Pt is awaiting emergency medicaid and KYLEE placement.     REVIEW OF SYSTEMS:    CONSTITUTIONAL: No fever, weight loss, or fatigue  RESPIRATORY: No cough   CARDIOVASCULAR: No chest pain       Vital Signs Last 24 Hrs  T(C): 37.3 (15 Nov 2017 08:00), Max: 38 (15 Nov 2017 05:07)  T(F): 99.2 (15 Nov 2017 08:00), Max: 100.4 (15 Nov 2017 05:07)  HR: 76 (15 Nov 2017 08:00) (66 - 80)  BP: 129/64 (15 Nov 2017 08:00) (129/64 - 151/67)  BP(mean): --  RR: 18 (15 Nov 2017 08:00) (18 - 22)  SpO2: 98% (15 Nov 2017 08:00) (98% - 100%)    PHYSICAL EXAM:    GENERAL: NAD, frail,   HEENT: MMM  CHEST/LUNG: Clear to percussion bilaterally   HEART: S1S2+, Regular rate and rhythm; No murmurs, rubs, or gallops  ABDOMEN: Soft, Nontender, Nondistended; Bowel sounds present       LABS:                        10.3   16.8  )-----------( 385      ( 15 Nov 2017 07:46 )             31.1                   MEDICATIONS  (STANDING):  amLODIPine   Tablet 10 milliGRAM(s) Oral daily  aspirin 325 milliGRAM(s) Oral daily  atorvastatin 40 milliGRAM(s) Oral at bedtime  dextrose 5%. 1000 milliLiter(s) (50 mL/Hr) IV Continuous <Continuous>  dextrose 50% Injectable 12.5 Gram(s) IV Push once  dextrose 50% Injectable 25 Gram(s) IV Push once  dextrose 50% Injectable 25 Gram(s) IV Push once  enoxaparin Injectable 30 milliGRAM(s) SubCutaneous daily  insulin glargine Injectable (LANTUS) 7 Unit(s) SubCutaneous at bedtime  insulin lispro (HumaLOG) corrective regimen sliding scale   SubCutaneous three times a day before meals  lactobacillus acidophilus 1 Tablet(s) Oral two times a day with meals  metoprolol     tartrate 50 milliGRAM(s) Oral two times a day  nystatin/triamcinolone Cream 1 Application(s) Topical two times a day  pantoprazole    Tablet 40 milliGRAM(s) Oral before breakfast  tamsulosin 0.4 milliGRAM(s) Oral at bedtime  vancomycin    Solution 125 milliGRAM(s) Oral every 6 hours    MEDICATIONS  (PRN):  acetaminophen   Tablet. 650 milliGRAM(s) Oral every 6 hours PRN Mild Pain (1 - 3)  dextrose Gel 1 Dose(s) Oral once PRN Blood Glucose LESS THAN 70 milliGRAM(s)/deciliter  glucagon  Injectable 1 milliGRAM(s) IntraMuscular once PRN Glucose LESS THAN 70 milligrams/deciliter  hydrALAZINE Injectable 10 milliGRAM(s) IV Push every 4 hours PRN forsbp above 160 mmhg  HYDROcodone/homatropine Syrup 5 milliLiter(s) Oral every 6 hours PRN Cough      RADIOLOGY & ADDITIONAL TESTS:

## 2017-11-15 NOTE — CHART NOTE - NSCHARTNOTEFT_GEN_A_CORE
Source: Patient [x ]  Family [ ]   other [ ]    Current Diet: Con Cho    Patient reports [ ] nausea  [ ] vomiting [ ] diarrhea [x ] constipation  [ ]chewing problems [ ] swallowing issues  [ ] other:     PO intake:  < 50% [ ]   50-75%  [ ]   %  [ x]  other :    Source for PO intake [ ] Patient [ ] family [ ] chart [ ] staff [ ] other    Enteral /Parenteral Nutrition:     Current Weight: 68.3 kg    % Weight Change     Pertinent Medications: MEDICATIONS  (STANDING):  amLODIPine   Tablet 10 milliGRAM(s) Oral daily  aspirin 325 milliGRAM(s) Oral daily  atorvastatin 40 milliGRAM(s) Oral at bedtime  dextrose 5%. 1000 milliLiter(s) (50 mL/Hr) IV Continuous <Continuous>  dextrose 50% Injectable 12.5 Gram(s) IV Push once  dextrose 50% Injectable 25 Gram(s) IV Push once  dextrose 50% Injectable 25 Gram(s) IV Push once  enoxaparin Injectable 30 milliGRAM(s) SubCutaneous daily  insulin glargine Injectable (LANTUS) 7 Unit(s) SubCutaneous at bedtime  insulin lispro (HumaLOG) corrective regimen sliding scale   SubCutaneous three times a day before meals  lactobacillus acidophilus 1 Tablet(s) Oral two times a day with meals  metoprolol     tartrate 50 milliGRAM(s) Oral two times a day  nystatin/triamcinolone Cream 1 Application(s) Topical two times a day  pantoprazole    Tablet 40 milliGRAM(s) Oral before breakfast  tamsulosin 0.4 milliGRAM(s) Oral at bedtime  vancomycin    Solution 125 milliGRAM(s) Oral every 6 hours    MEDICATIONS  (PRN):  acetaminophen   Tablet. 650 milliGRAM(s) Oral every 6 hours PRN Mild Pain (1 - 3)  dextrose Gel 1 Dose(s) Oral once PRN Blood Glucose LESS THAN 70 milliGRAM(s)/deciliter  glucagon  Injectable 1 milliGRAM(s) IntraMuscular once PRN Glucose LESS THAN 70 milligrams/deciliter  hydrALAZINE Injectable 10 milliGRAM(s) IV Push every 4 hours PRN forsbp above 160 mmhg  HYDROcodone/homatropine Syrup 5 milliLiter(s) Oral every 6 hours PRN Cough    Pertinent Labs: CBC Full  -  ( 15 Nov 2017 07:46 )  WBC Count : 16.8 K/uL  Hemoglobin : 10.3 g/dL  Hematocrit : 31.1 %  Platelet Count - Automated : 385 K/uL  Mean Cell Volume : 88.9 fl  Mean Cell Hemoglobin : 29.4 pg  Mean Cell Hemoglobin Concentration : 33.1 g/dL  Auto Neutrophil # : x  Auto Lymphocyte # : x  Auto Monocyte # : x  Auto Eosinophil # : x  Auto Basophil # : x  Auto Neutrophil % : x  Auto Lymphocyte % : x  Auto Monocyte % : x  Auto Eosinophil % : x  Auto Basophil % : x          Skin:     Nutrition focused physical exam conducted - found signs of malnutrition [ x]absent [ ]present    Subcutaneous fat loss: [ ] Orbital fat pads region, [ ]Buccal fat region, [ ]Triceps region,  [ ]Ribs region    Muscle wasting: [ ]Temples region, [ ]Clavicle region, [ ]Shoulder region, [ ]Scapula region, [ ]Interosseous region,  [ ]thigh region, [ ]Calf region    Estimated Needs:   [ ] no change since previous assessment  [ ] recalculated:     Current Nutrition Diagnosis: Pt presents at risk secondary to altered G I function, related to C-diff colitis, as evidenced by diarrhea. PO remaons %, weight stable.     Recommendations: Continue plan    Monitoring and Evaluation:   [ ] PO intake [ ] Tolerance to diet prescription [X] Weights  [X] Follow up per protocol [X] Labs:

## 2017-11-16 VITALS
OXYGEN SATURATION: 98 % | HEART RATE: 71 BPM | DIASTOLIC BLOOD PRESSURE: 57 MMHG | SYSTOLIC BLOOD PRESSURE: 127 MMHG | RESPIRATION RATE: 18 BRPM | TEMPERATURE: 100 F

## 2017-11-16 LAB
-  AMPICILLIN/SULBACTAM: SIGNIFICANT CHANGE UP
-  CEFAZOLIN: SIGNIFICANT CHANGE UP
-  CIPROFLOXACIN: SIGNIFICANT CHANGE UP
-  CLINDAMYCIN: SIGNIFICANT CHANGE UP
-  DAPTOMYCIN: SIGNIFICANT CHANGE UP
-  ERYTHROMYCIN: SIGNIFICANT CHANGE UP
-  GENTAMICIN: SIGNIFICANT CHANGE UP
-  LEVOFLOXACIN: SIGNIFICANT CHANGE UP
-  LINEZOLID: SIGNIFICANT CHANGE UP
-  MOXIFLOXACIN(AEROBIC): SIGNIFICANT CHANGE UP
-  OXACILLIN: SIGNIFICANT CHANGE UP
-  PENICILLIN: SIGNIFICANT CHANGE UP
-  RIFAMPIN: SIGNIFICANT CHANGE UP
-  TETRACYCLINE: SIGNIFICANT CHANGE UP
-  TRIMETHOPRIM/SULFAMETHOXAZOLE: SIGNIFICANT CHANGE UP
-  VANCOMYCIN: SIGNIFICANT CHANGE UP
ANION GAP SERPL CALC-SCNC: 16 MMOL/L — SIGNIFICANT CHANGE UP (ref 5–17)
BUN SERPL-MCNC: 66 MG/DL — HIGH (ref 8–20)
CALCIUM SERPL-MCNC: 8.7 MG/DL — SIGNIFICANT CHANGE UP (ref 8.6–10.2)
CHLORIDE SERPL-SCNC: 99 MMOL/L — SIGNIFICANT CHANGE UP (ref 98–107)
CO2 SERPL-SCNC: 17 MMOL/L — LOW (ref 22–29)
CREAT SERPL-MCNC: 3.02 MG/DL — HIGH (ref 0.5–1.3)
GLUCOSE BLDC GLUCOMTR-MCNC: 212 MG/DL — HIGH (ref 70–99)
GLUCOSE BLDC GLUCOMTR-MCNC: 257 MG/DL — HIGH (ref 70–99)
GLUCOSE BLDC GLUCOMTR-MCNC: 308 MG/DL — HIGH (ref 70–99)
GLUCOSE SERPL-MCNC: 225 MG/DL — HIGH (ref 70–115)
HCT VFR BLD CALC: 28.7 % — LOW (ref 42–52)
HGB BLD-MCNC: 9.6 G/DL — LOW (ref 14–18)
MCHC RBC-ENTMCNC: 29.2 PG — SIGNIFICANT CHANGE UP (ref 27–31)
MCHC RBC-ENTMCNC: 33.4 G/DL — SIGNIFICANT CHANGE UP (ref 32–36)
MCV RBC AUTO: 87.2 FL — SIGNIFICANT CHANGE UP (ref 80–94)
METHOD TYPE: SIGNIFICANT CHANGE UP
PLATELET # BLD AUTO: 338 K/UL — SIGNIFICANT CHANGE UP (ref 150–400)
POTASSIUM SERPL-MCNC: 4.5 MMOL/L — SIGNIFICANT CHANGE UP (ref 3.5–5.3)
POTASSIUM SERPL-SCNC: 4.5 MMOL/L — SIGNIFICANT CHANGE UP (ref 3.5–5.3)
RBC # BLD: 3.29 M/UL — LOW (ref 4.6–6.2)
RBC # FLD: 16.2 % — HIGH (ref 11–15.6)
SODIUM SERPL-SCNC: 132 MMOL/L — LOW (ref 135–145)
WBC # BLD: 12.8 K/UL — HIGH (ref 4.8–10.8)
WBC # FLD AUTO: 12.8 K/UL — HIGH (ref 4.8–10.8)

## 2017-11-16 PROCEDURE — 97163 PT EVAL HIGH COMPLEX 45 MIN: CPT

## 2017-11-16 PROCEDURE — 83036 HEMOGLOBIN GLYCOSYLATED A1C: CPT

## 2017-11-16 PROCEDURE — 82607 VITAMIN B-12: CPT

## 2017-11-16 PROCEDURE — T1013: CPT

## 2017-11-16 PROCEDURE — 93923 UPR/LXTR ART STDY 3+ LVLS: CPT

## 2017-11-16 PROCEDURE — 84480 ASSAY TRIIODOTHYRONINE (T3): CPT

## 2017-11-16 PROCEDURE — 97116 GAIT TRAINING THERAPY: CPT

## 2017-11-16 PROCEDURE — 83605 ASSAY OF LACTIC ACID: CPT

## 2017-11-16 PROCEDURE — 83735 ASSAY OF MAGNESIUM: CPT

## 2017-11-16 PROCEDURE — 87040 BLOOD CULTURE FOR BACTERIA: CPT

## 2017-11-16 PROCEDURE — 96374 THER/PROPH/DIAG INJ IV PUSH: CPT

## 2017-11-16 PROCEDURE — 90686 IIV4 VACC NO PRSV 0.5 ML IM: CPT

## 2017-11-16 PROCEDURE — 87086 URINE CULTURE/COLONY COUNT: CPT

## 2017-11-16 PROCEDURE — 87186 SC STD MICRODIL/AGAR DIL: CPT

## 2017-11-16 PROCEDURE — 85027 COMPLETE CBC AUTOMATED: CPT

## 2017-11-16 PROCEDURE — 93005 ELECTROCARDIOGRAM TRACING: CPT

## 2017-11-16 PROCEDURE — 80048 BASIC METABOLIC PNL TOTAL CA: CPT

## 2017-11-16 PROCEDURE — 82962 GLUCOSE BLOOD TEST: CPT

## 2017-11-16 PROCEDURE — 97110 THERAPEUTIC EXERCISES: CPT

## 2017-11-16 PROCEDURE — 73620 X-RAY EXAM OF FOOT: CPT

## 2017-11-16 PROCEDURE — 84436 ASSAY OF TOTAL THYROXINE: CPT

## 2017-11-16 PROCEDURE — 83880 ASSAY OF NATRIURETIC PEPTIDE: CPT

## 2017-11-16 PROCEDURE — 36415 COLL VENOUS BLD VENIPUNCTURE: CPT

## 2017-11-16 PROCEDURE — 84484 ASSAY OF TROPONIN QUANT: CPT

## 2017-11-16 PROCEDURE — 99239 HOSP IP/OBS DSCHRG MGMT >30: CPT

## 2017-11-16 PROCEDURE — 74176 CT ABD & PELVIS W/O CONTRAST: CPT

## 2017-11-16 PROCEDURE — 87070 CULTURE OTHR SPECIMN AEROBIC: CPT

## 2017-11-16 PROCEDURE — 71045 X-RAY EXAM CHEST 1 VIEW: CPT

## 2017-11-16 PROCEDURE — 81001 URINALYSIS AUTO W/SCOPE: CPT

## 2017-11-16 PROCEDURE — 87493 C DIFF AMPLIFIED PROBE: CPT

## 2017-11-16 PROCEDURE — 83690 ASSAY OF LIPASE: CPT

## 2017-11-16 PROCEDURE — 80053 COMPREHEN METABOLIC PANEL: CPT

## 2017-11-16 PROCEDURE — 84443 ASSAY THYROID STIM HORMONE: CPT

## 2017-11-16 PROCEDURE — 80061 LIPID PANEL: CPT

## 2017-11-16 PROCEDURE — 99285 EMERGENCY DEPT VISIT HI MDM: CPT | Mod: 25

## 2017-11-16 PROCEDURE — 82306 VITAMIN D 25 HYDROXY: CPT

## 2017-11-16 RX ORDER — LACTOBACILLUS ACIDOPHILUS 100MM CELL
1 CAPSULE ORAL
Qty: 0 | Refills: 0 | COMMUNITY
Start: 2017-11-16 | End: 2017-11-23

## 2017-11-16 RX ORDER — NYSTATIN/TRIAMCINOLONE ACET
1 OINTMENT (GRAM) TOPICAL
Qty: 0 | Refills: 0 | COMMUNITY
Start: 2017-11-16

## 2017-11-16 RX ADMIN — PANTOPRAZOLE SODIUM 40 MILLIGRAM(S): 20 TABLET, DELAYED RELEASE ORAL at 05:53

## 2017-11-16 RX ADMIN — Medication 125 MILLIGRAM(S): at 12:41

## 2017-11-16 RX ADMIN — Medication 125 MILLIGRAM(S): at 18:16

## 2017-11-16 RX ADMIN — Medication 1 TABLET(S): at 18:15

## 2017-11-16 RX ADMIN — Medication 125 MILLIGRAM(S): at 05:53

## 2017-11-16 RX ADMIN — Medication: at 18:15

## 2017-11-16 RX ADMIN — Medication: at 09:58

## 2017-11-16 RX ADMIN — Medication 50 MILLIGRAM(S): at 05:53

## 2017-11-16 RX ADMIN — Medication 50 MILLIGRAM(S): at 18:15

## 2017-11-16 RX ADMIN — Medication 1 TABLET(S): at 09:55

## 2017-11-16 RX ADMIN — AMLODIPINE BESYLATE 10 MILLIGRAM(S): 2.5 TABLET ORAL at 05:53

## 2017-11-16 RX ADMIN — Medication 1 APPLICATION(S): at 18:16

## 2017-11-16 RX ADMIN — Medication: at 12:40

## 2017-11-16 RX ADMIN — Medication 325 MILLIGRAM(S): at 12:41

## 2017-11-16 RX ADMIN — Medication 1 APPLICATION(S): at 05:53

## 2017-11-16 RX ADMIN — ENOXAPARIN SODIUM 30 MILLIGRAM(S): 100 INJECTION SUBCUTANEOUS at 12:41

## 2017-11-16 NOTE — PROGRESS NOTE ADULT - PROVIDER SPECIALTY LIST ADULT
Hospitalist
Internal Medicine
Podiatry
Hospitalist

## 2017-11-16 NOTE — PROGRESS NOTE ADULT - SUBJECTIVE AND OBJECTIVE BOX
JUAN MIGUEL PARADA    [id number]    83y      Male    CC: Via bedside , Yas. Pt denies any complaints.     Hospital course:  82 yo M with h/o DM2, PVD, HTN, CRI, pacemaker recently moved from [place] presented with abdominal pain. Found to have proteus UTI and treated with augmentin. Was C. diff positive and started on vancomycin. Daughter added that the patient has been getting progressively weaker and has been having multiple falls. PT recommending KYLEE. Pt is KYLEE placement.     REVIEW OF SYSTEMS:    CONSTITUTIONAL: No fever   RESPIRATORY: No cough   CARDIOVASCULAR: No chest pain     Vital Signs Last 24 Hrs  T(C): 37.7 (16 Nov 2017 07:38), Max: 37.7 (16 Nov 2017 07:38)  T(F): 99.8 (16 Nov 2017 07:38), Max: 99.8 (16 Nov 2017 07:38)  HR: 71 (16 Nov 2017 07:38) (68 - 85)  BP: 127/57 (16 Nov 2017 07:38) (118/62 - 137/68)  BP(mean): --  RR: 18 (16 Nov 2017 07:38) (18 - 18)  SpO2: 98% (16 Nov 2017 07:38) (94% - 99%)    PHYSICAL EXAM:    GENERAL: NAD, nontoxic appearing  HEENT: PERRL, +EOMI  CHEST/LUNG: Clear to percussion bilaterally; No wheezing  HEART: S1S2+   ABDOMEN: Soft, Nontender, Nondistended; Bowel sounds present       LABS:                        9.6    12.8  )-----------( 338      ( 16 Nov 2017 09:53 )             28.7     11-16    132<L>  |  99  |  66.0<H>  ----------------------------<  225<H>  4.5   |  17.0<L>  |  3.02<H>    Ca    8.7      16 Nov 2017 09:53              MEDICATIONS  (STANDING):  amLODIPine   Tablet 10 milliGRAM(s) Oral daily  aspirin 325 milliGRAM(s) Oral daily  atorvastatin 40 milliGRAM(s) Oral at bedtime  dextrose 5%. 1000 milliLiter(s) (50 mL/Hr) IV Continuous <Continuous>  dextrose 50% Injectable 12.5 Gram(s) IV Push once  dextrose 50% Injectable 25 Gram(s) IV Push once  dextrose 50% Injectable 25 Gram(s) IV Push once  enoxaparin Injectable 30 milliGRAM(s) SubCutaneous daily  insulin glargine Injectable (LANTUS) 7 Unit(s) SubCutaneous at bedtime  insulin lispro (HumaLOG) corrective regimen sliding scale   SubCutaneous three times a day before meals  lactobacillus acidophilus 1 Tablet(s) Oral two times a day with meals  metoprolol     tartrate 50 milliGRAM(s) Oral two times a day  nystatin/triamcinolone Cream 1 Application(s) Topical two times a day  pantoprazole    Tablet 40 milliGRAM(s) Oral before breakfast  tamsulosin 0.4 milliGRAM(s) Oral at bedtime  vancomycin    Solution 125 milliGRAM(s) Oral every 6 hours    MEDICATIONS  (PRN):  acetaminophen   Tablet. 650 milliGRAM(s) Oral every 6 hours PRN Mild Pain (1 - 3)  dextrose Gel 1 Dose(s) Oral once PRN Blood Glucose LESS THAN 70 milliGRAM(s)/deciliter  glucagon  Injectable 1 milliGRAM(s) IntraMuscular once PRN Glucose LESS THAN 70 milligrams/deciliter  hydrALAZINE Injectable 10 milliGRAM(s) IV Push every 4 hours PRN forsbp above 160 mmhg      RADIOLOGY & ADDITIONAL TESTS:

## 2017-11-16 NOTE — PROGRESS NOTE ADULT - PROBLEM SELECTOR PLAN 5
BP monitoring. Continue Amlodipine 10 mg qd and Metoprolol 25 mg bid. Cont prn Hydralazine 10 mg IVP for SBP > 160.
c/w flomax
c/w flomax

## 2017-11-16 NOTE — PROGRESS NOTE ADULT - PROBLEM SELECTOR PLAN 4
Cont aspirin and atorvastatin. Pt is s/p left midfoot amputation 4 weeks ago in [place]. Residual lateral ulcer. Will obtain vascular consult.
avoid nephrotoxins
avoid nephrotoxins

## 2017-11-16 NOTE — PROGRESS NOTE ADULT - SUBJECTIVE AND OBJECTIVE BOX
84 yo male with PMHX of DM, PVD, CRI, pacemaker seen at bedside for left foot stable TMA ulcer. Pt is NAD and AAOX 3. Patient is a Bulgarian speaker and poor historian. Pt is admitted for generalized abdominal pain and diarrhea. PT denies N/V/C/F. Pt is diabetic.     HPI:  84 y/o male with h/o DM II, HTN, PVD, CRI, pacemaker  who was recently moved from [place], started to have generalized abdominal pain and diarrhea 1 day ago. no fever. no nausea or vomiting. no blood in the stools. symptoms resolved spontaneously today. patient also c/o SOB on walking. as per ER notes, Daughter added that the patient is bed for the last 2 weeks feeling weak and had multiple falls. 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 650 milliGRAM(s) Oral every 6 hours PRN  amLODIPine   Tablet 10 milliGRAM(s) Oral daily  aspirin 325 milliGRAM(s) Oral daily  atorvastatin 40 milliGRAM(s) Oral at bedtime  dextrose 5%. 1000 milliLiter(s) IV Continuous <Continuous>  dextrose 50% Injectable 12.5 Gram(s) IV Push once  dextrose 50% Injectable 25 Gram(s) IV Push once  dextrose 50% Injectable 25 Gram(s) IV Push once  dextrose Gel 1 Dose(s) Oral once PRN  enoxaparin Injectable 30 milliGRAM(s) SubCutaneous daily  glucagon  Injectable 1 milliGRAM(s) IntraMuscular once PRN  hydrALAZINE Injectable 10 milliGRAM(s) IV Push every 4 hours PRN  HYDROcodone/homatropine Syrup 5 milliLiter(s) Oral every 6 hours PRN  insulin glargine Injectable (LANTUS) 7 Unit(s) SubCutaneous at bedtime  insulin lispro (HumaLOG) corrective regimen sliding scale   SubCutaneous three times a day before meals  lactobacillus acidophilus 1 Tablet(s) Oral two times a day with meals  metoprolol     tartrate 50 milliGRAM(s) Oral two times a day  nystatin/triamcinolone Cream 1 Application(s) Topical two times a day  pantoprazole    Tablet 40 milliGRAM(s) Oral before breakfast  tamsulosin 0.4 milliGRAM(s) Oral at bedtime  vancomycin    Solution 125 milliGRAM(s) Oral every 6 hours      SOCIAL HISTORY:  Smoker:      FAMILY HISTORY:  No pertinent family history in first degree relatives      RADIOLOGY:    < from: Xray Foot AP + Lateral, Bilateral (11.03.17 @ 19:04) >    FINDINGS: Redemonstration of partial left foot amputation. Stable   overlying soft tissue swelling. No periosteal reaction is seen. Bilateral   plantar calcaneal spurs noted. Vascular calcifications in the soft   tissues.     IMPRESSION: No radiographic evidence of osteomyelitis.    < end of copied text >        PE: Left Foot   Vascular: DP/PT: non palpable; CFT< none; TG: wnl; no edema noted  Derm: stable fibrotic ulceration noted on the lateral aspect of Left foot TMA; mild serous drainage noted; no pus noted; stable wound; no clinical sign of infection; no erythema; no mal odor  NEuro: Unable to assess     A: Left Foot TMA with Stable Lateral Ulceration at stump site     P:  Patient evalauted and chart reviewed  Xray ordered; results reviewed noted as above  AWILDA ordered; R: 1.03; L 0.73  Cx ordered; MRSA  F/up Vascular and ID consult.  Continue IV abx as per ID recommendation.  Monitor WBC  DSD applied  Keep dressing clean, dry and intact to the left foot   Pt is podiatrically stable for discharge if medically stable. Pt will follow up with Podiatry/Dr. Jain for further wound care as outpatient.  Pt needs to be discharge with oral antibiotic (Bactrim) due to the MRSA culture results of the wound.   Podiatry will follow patient in house    Wound Care Dressing Orders  1. Remove old dressing  2. Apply gauze  3. Wrap with kerlix  4. Keep dressing clean, dry, and intact to the left foot  5. Follow up with Dr. Jain as outpatient for further wound care.

## 2017-11-16 NOTE — PROGRESS NOTE ADULT - PROBLEM SELECTOR PROBLEM 4
CRI (chronic renal insufficiency), stage 3 (moderate)
PVD (peripheral vascular disease)
CRI (chronic renal insufficiency), stage 3 (moderate)

## 2017-11-16 NOTE — PROGRESS NOTE ADULT - PROBLEM SELECTOR PLAN 1
C/w vancomycin 125mg q6h day 7/14
History of multiple falls. PT and SW consults pending. May be secondary to foot amputation and foot pain. Pt 4 weeks s/p midfoot amputation. Ambulates at home w walker.
Starting to have diarrhea again  C/w vancomycin 125mg q6h day 6/14

## 2017-11-16 NOTE — PROGRESS NOTE ADULT - PROBLEM SELECTOR PLAN 6
appreciate uro consult. for f/u as outpt.
Will avoid metformin and ACEI. Follow daily CMP.
appreciate uro consult. for f/u as outpt.

## 2017-11-17 RX ORDER — VANCOMYCIN HCL 1 G
1 VIAL (EA) INTRAVENOUS
Qty: 0 | Refills: 0 | COMMUNITY
Start: 2017-11-17 | End: 2017-11-23

## 2017-11-17 NOTE — CHART NOTE - NSCHARTNOTEFT_GEN_A_CORE
Called by mircobiology regarding results of Urine culture after discharge + for esbl    Called [facility] spoke with nursing manager Radha Muniz results faxed to [phone]. Results to be forwarded to PMD

## 2017-11-19 LAB
CULTURE RESULTS: SIGNIFICANT CHANGE UP
ORGANISM # SPEC MICROSCOPIC CNT: SIGNIFICANT CHANGE UP
ORGANISM # SPEC MICROSCOPIC CNT: SIGNIFICANT CHANGE UP
SPECIMEN SOURCE: SIGNIFICANT CHANGE UP

## 2017-12-26 ENCOUNTER — INPATIENT (INPATIENT)
Facility: HOSPITAL | Age: 82
LOS: 2 days | Discharge: NURSING, REHAB OR LONG-TERM CARE | DRG: 291 | End: 2017-12-29
Attending: HOSPITALIST | Admitting: INTERNAL MEDICINE
Payer: MEDICAID

## 2017-12-26 VITALS
OXYGEN SATURATION: 84 % | HEART RATE: 74 BPM | WEIGHT: 160.06 LBS | SYSTOLIC BLOOD PRESSURE: 135 MMHG | RESPIRATION RATE: 32 BRPM | HEIGHT: 66.93 IN | TEMPERATURE: 93 F | DIASTOLIC BLOOD PRESSURE: 65 MMHG

## 2017-12-26 DIAGNOSIS — J96.00 ACUTE RESPIRATORY FAILURE, UNSPECIFIED WHETHER WITH HYPOXIA OR HYPERCAPNIA: ICD-10-CM

## 2017-12-26 DIAGNOSIS — S98.139A COMPLETE TRAUMATIC AMPUTATION OF ONE UNSPECIFIED LESSER TOE, INITIAL ENCOUNTER: Chronic | ICD-10-CM

## 2017-12-26 PROBLEM — I10 ESSENTIAL (PRIMARY) HYPERTENSION: Chronic | Status: ACTIVE | Noted: 2017-11-02

## 2017-12-26 LAB
ALBUMIN SERPL ELPH-MCNC: 3.2 G/DL — LOW (ref 3.3–5.2)
ALP SERPL-CCNC: 150 U/L — HIGH (ref 40–120)
ALT FLD-CCNC: 16 U/L — SIGNIFICANT CHANGE UP
ANION GAP SERPL CALC-SCNC: 15 MMOL/L — SIGNIFICANT CHANGE UP (ref 5–17)
APPEARANCE UR: CLEAR — SIGNIFICANT CHANGE UP
APTT BLD: 30.1 SEC — SIGNIFICANT CHANGE UP (ref 27.5–37.4)
AST SERPL-CCNC: 22 U/L — SIGNIFICANT CHANGE UP
BACTERIA # UR AUTO: ABNORMAL
BASOPHILS # BLD AUTO: 0 K/UL — SIGNIFICANT CHANGE UP (ref 0–0.2)
BASOPHILS NFR BLD AUTO: 0.1 % — SIGNIFICANT CHANGE UP (ref 0–2)
BILIRUB SERPL-MCNC: 0.2 MG/DL — LOW (ref 0.4–2)
BILIRUB UR-MCNC: NEGATIVE — SIGNIFICANT CHANGE UP
BUN SERPL-MCNC: 34 MG/DL — HIGH (ref 8–20)
CALCIUM SERPL-MCNC: 9.1 MG/DL — SIGNIFICANT CHANGE UP (ref 8.6–10.2)
CHLORIDE SERPL-SCNC: 102 MMOL/L — SIGNIFICANT CHANGE UP (ref 98–107)
CO2 SERPL-SCNC: 23 MMOL/L — SIGNIFICANT CHANGE UP (ref 22–29)
COLOR SPEC: YELLOW — SIGNIFICANT CHANGE UP
CREAT SERPL-MCNC: 2.57 MG/DL — HIGH (ref 0.5–1.3)
DIFF PNL FLD: ABNORMAL
EOSINOPHIL # BLD AUTO: 0 K/UL — SIGNIFICANT CHANGE UP (ref 0–0.5)
EOSINOPHIL NFR BLD AUTO: 0 % — SIGNIFICANT CHANGE UP (ref 0–5)
EPI CELLS # UR: SIGNIFICANT CHANGE UP
GLUCOSE BLDC GLUCOMTR-MCNC: 106 MG/DL — HIGH (ref 70–99)
GLUCOSE BLDC GLUCOMTR-MCNC: 121 MG/DL — HIGH (ref 70–99)
GLUCOSE BLDC GLUCOMTR-MCNC: 130 MG/DL — HIGH (ref 70–99)
GLUCOSE SERPL-MCNC: 44 MG/DL — CRITICAL LOW (ref 70–115)
GLUCOSE UR QL: NEGATIVE MG/DL — SIGNIFICANT CHANGE UP
HCT VFR BLD CALC: 30.3 % — LOW (ref 42–52)
HGB BLD-MCNC: 9.8 G/DL — LOW (ref 14–18)
INR BLD: 1.24 RATIO — HIGH (ref 0.88–1.16)
KETONES UR-MCNC: NEGATIVE — SIGNIFICANT CHANGE UP
LACTATE BLDV-MCNC: 0.7 MMOL/L — SIGNIFICANT CHANGE UP (ref 0.5–2)
LEUKOCYTE ESTERASE UR-ACNC: ABNORMAL
LYMPHOCYTES # BLD AUTO: 0.5 K/UL — LOW (ref 1–4.8)
LYMPHOCYTES # BLD AUTO: 6.7 % — LOW (ref 20–55)
MCHC RBC-ENTMCNC: 28.7 PG — SIGNIFICANT CHANGE UP (ref 27–31)
MCHC RBC-ENTMCNC: 32.3 G/DL — SIGNIFICANT CHANGE UP (ref 32–36)
MCV RBC AUTO: 88.9 FL — SIGNIFICANT CHANGE UP (ref 80–94)
MONOCYTES # BLD AUTO: 0.3 K/UL — SIGNIFICANT CHANGE UP (ref 0–0.8)
MONOCYTES NFR BLD AUTO: 3.5 % — SIGNIFICANT CHANGE UP (ref 3–10)
NEUTROPHILS # BLD AUTO: 6.7 K/UL — SIGNIFICANT CHANGE UP (ref 1.8–8)
NEUTROPHILS NFR BLD AUTO: 89.6 % — HIGH (ref 37–73)
NITRITE UR-MCNC: NEGATIVE — SIGNIFICANT CHANGE UP
NT-PROBNP SERPL-SCNC: HIGH PG/ML (ref 0–300)
PH UR: 6 — SIGNIFICANT CHANGE UP (ref 5–8)
PLATELET # BLD AUTO: 305 K/UL — SIGNIFICANT CHANGE UP (ref 150–400)
POTASSIUM SERPL-MCNC: 4.8 MMOL/L — SIGNIFICANT CHANGE UP (ref 3.5–5.3)
POTASSIUM SERPL-SCNC: 4.8 MMOL/L — SIGNIFICANT CHANGE UP (ref 3.5–5.3)
PROT SERPL-MCNC: 7.4 G/DL — SIGNIFICANT CHANGE UP (ref 6.6–8.7)
PROT UR-MCNC: 100 MG/DL
PROTHROM AB SERPL-ACNC: 13.7 SEC — HIGH (ref 9.8–12.7)
RBC # BLD: 3.41 M/UL — LOW (ref 4.6–6.2)
RBC # FLD: 16.5 % — HIGH (ref 11–15.6)
RBC CASTS # UR COMP ASSIST: SIGNIFICANT CHANGE UP /HPF (ref 0–4)
SODIUM SERPL-SCNC: 140 MMOL/L — SIGNIFICANT CHANGE UP (ref 135–145)
SP GR SPEC: 1.01 — SIGNIFICANT CHANGE UP (ref 1.01–1.02)
TROPONIN T SERPL-MCNC: 0.04 NG/ML — SIGNIFICANT CHANGE UP (ref 0–0.06)
UROBILINOGEN FLD QL: NEGATIVE MG/DL — SIGNIFICANT CHANGE UP
WBC # BLD: 7.4 K/UL — SIGNIFICANT CHANGE UP (ref 4.8–10.8)
WBC # FLD AUTO: 7.4 K/UL — SIGNIFICANT CHANGE UP (ref 4.8–10.8)
WBC UR QL: ABNORMAL

## 2017-12-26 PROCEDURE — 99291 CRITICAL CARE FIRST HOUR: CPT

## 2017-12-26 PROCEDURE — 71010: CPT | Mod: 26

## 2017-12-26 PROCEDURE — 93010 ELECTROCARDIOGRAM REPORT: CPT

## 2017-12-26 PROCEDURE — 71250 CT THORAX DX C-: CPT | Mod: 26

## 2017-12-26 PROCEDURE — 99223 1ST HOSP IP/OBS HIGH 75: CPT

## 2017-12-26 RX ORDER — DEXTROSE 50 % IN WATER 50 %
12.5 SYRINGE (ML) INTRAVENOUS ONCE
Qty: 0 | Refills: 0 | Status: DISCONTINUED | OUTPATIENT
Start: 2017-12-26 | End: 2017-12-28

## 2017-12-26 RX ORDER — SODIUM CHLORIDE 9 MG/ML
2100 INJECTION INTRAMUSCULAR; INTRAVENOUS; SUBCUTANEOUS ONCE
Qty: 0 | Refills: 0 | Status: DISCONTINUED | OUTPATIENT
Start: 2017-12-26 | End: 2017-12-26

## 2017-12-26 RX ORDER — ERTAPENEM SODIUM 1 G/1
INJECTION, POWDER, LYOPHILIZED, FOR SOLUTION INTRAMUSCULAR; INTRAVENOUS
Qty: 0 | Refills: 0 | Status: DISCONTINUED | OUTPATIENT
Start: 2017-12-26 | End: 2017-12-29

## 2017-12-26 RX ORDER — VANCOMYCIN HCL 1 G
1000 VIAL (EA) INTRAVENOUS ONCE
Qty: 0 | Refills: 0 | Status: COMPLETED | OUTPATIENT
Start: 2017-12-26 | End: 2017-12-26

## 2017-12-26 RX ORDER — SODIUM CHLORIDE 9 MG/ML
3 INJECTION INTRAMUSCULAR; INTRAVENOUS; SUBCUTANEOUS ONCE
Qty: 0 | Refills: 0 | Status: COMPLETED | OUTPATIENT
Start: 2017-12-26 | End: 2017-12-26

## 2017-12-26 RX ORDER — HEPARIN SODIUM 5000 [USP'U]/ML
5000 INJECTION INTRAVENOUS; SUBCUTANEOUS EVERY 8 HOURS
Qty: 0 | Refills: 0 | Status: DISCONTINUED | OUTPATIENT
Start: 2017-12-26 | End: 2017-12-29

## 2017-12-26 RX ORDER — ATORVASTATIN CALCIUM 80 MG/1
40 TABLET, FILM COATED ORAL AT BEDTIME
Qty: 0 | Refills: 0 | Status: DISCONTINUED | OUTPATIENT
Start: 2017-12-26 | End: 2017-12-29

## 2017-12-26 RX ORDER — DEXTROSE 50 % IN WATER 50 %
25 SYRINGE (ML) INTRAVENOUS ONCE
Qty: 0 | Refills: 0 | Status: DISCONTINUED | OUTPATIENT
Start: 2017-12-26 | End: 2017-12-28

## 2017-12-26 RX ORDER — FUROSEMIDE 40 MG
40 TABLET ORAL ONCE
Qty: 0 | Refills: 0 | Status: COMPLETED | OUTPATIENT
Start: 2017-12-26 | End: 2017-12-26

## 2017-12-26 RX ORDER — GLUCAGON INJECTION, SOLUTION 0.5 MG/.1ML
1 INJECTION, SOLUTION SUBCUTANEOUS ONCE
Qty: 0 | Refills: 0 | Status: DISCONTINUED | OUTPATIENT
Start: 2017-12-26 | End: 2017-12-29

## 2017-12-26 RX ORDER — DEXTROSE 50 % IN WATER 50 %
50 SYRINGE (ML) INTRAVENOUS ONCE
Qty: 0 | Refills: 0 | Status: DISCONTINUED | OUTPATIENT
Start: 2017-12-26 | End: 2017-12-26

## 2017-12-26 RX ORDER — SODIUM CHLORIDE 9 MG/ML
1000 INJECTION, SOLUTION INTRAVENOUS
Qty: 0 | Refills: 0 | Status: DISCONTINUED | OUTPATIENT
Start: 2017-12-26 | End: 2017-12-28

## 2017-12-26 RX ORDER — ASPIRIN/CALCIUM CARB/MAGNESIUM 324 MG
325 TABLET ORAL DAILY
Qty: 0 | Refills: 0 | Status: DISCONTINUED | OUTPATIENT
Start: 2017-12-26 | End: 2017-12-29

## 2017-12-26 RX ORDER — ERTAPENEM SODIUM 1 G/1
500 INJECTION, POWDER, LYOPHILIZED, FOR SOLUTION INTRAMUSCULAR; INTRAVENOUS ONCE
Qty: 0 | Refills: 0 | Status: COMPLETED | OUTPATIENT
Start: 2017-12-26 | End: 2017-12-26

## 2017-12-26 RX ORDER — HYDROCORTISONE 20 MG
100 TABLET ORAL ONCE
Qty: 0 | Refills: 0 | Status: COMPLETED | OUTPATIENT
Start: 2017-12-26 | End: 2017-12-26

## 2017-12-26 RX ORDER — FUROSEMIDE 40 MG
40 TABLET ORAL DAILY
Qty: 0 | Refills: 0 | Status: DISCONTINUED | OUTPATIENT
Start: 2017-12-26 | End: 2017-12-29

## 2017-12-26 RX ORDER — SODIUM CHLORIDE 9 MG/ML
500 INJECTION INTRAMUSCULAR; INTRAVENOUS; SUBCUTANEOUS ONCE
Qty: 0 | Refills: 0 | Status: DISCONTINUED | OUTPATIENT
Start: 2017-12-26 | End: 2017-12-26

## 2017-12-26 RX ORDER — DEXTROSE 50 % IN WATER 50 %
1 SYRINGE (ML) INTRAVENOUS ONCE
Qty: 0 | Refills: 0 | Status: DISCONTINUED | OUTPATIENT
Start: 2017-12-26 | End: 2017-12-28

## 2017-12-26 RX ORDER — SODIUM CHLORIDE 9 MG/ML
1000 INJECTION INTRAMUSCULAR; INTRAVENOUS; SUBCUTANEOUS ONCE
Qty: 0 | Refills: 0 | Status: COMPLETED | OUTPATIENT
Start: 2017-12-26 | End: 2017-12-26

## 2017-12-26 RX ORDER — TAMSULOSIN HYDROCHLORIDE 0.4 MG/1
0.4 CAPSULE ORAL AT BEDTIME
Qty: 0 | Refills: 0 | Status: DISCONTINUED | OUTPATIENT
Start: 2017-12-26 | End: 2017-12-29

## 2017-12-26 RX ORDER — METOPROLOL TARTRATE 50 MG
50 TABLET ORAL
Qty: 0 | Refills: 0 | Status: DISCONTINUED | OUTPATIENT
Start: 2017-12-26 | End: 2017-12-29

## 2017-12-26 RX ORDER — PIPERACILLIN AND TAZOBACTAM 4; .5 G/20ML; G/20ML
3.38 INJECTION, POWDER, LYOPHILIZED, FOR SOLUTION INTRAVENOUS ONCE
Qty: 0 | Refills: 0 | Status: COMPLETED | OUTPATIENT
Start: 2017-12-26 | End: 2017-12-26

## 2017-12-26 RX ORDER — DEXTROSE 50 % IN WATER 50 %
50 SYRINGE (ML) INTRAVENOUS ONCE
Qty: 0 | Refills: 0 | Status: COMPLETED | OUTPATIENT
Start: 2017-12-26 | End: 2017-12-26

## 2017-12-26 RX ORDER — PANTOPRAZOLE SODIUM 20 MG/1
40 TABLET, DELAYED RELEASE ORAL
Qty: 0 | Refills: 0 | Status: DISCONTINUED | OUTPATIENT
Start: 2017-12-26 | End: 2017-12-29

## 2017-12-26 RX ORDER — ERTAPENEM SODIUM 1 G/1
500 INJECTION, POWDER, LYOPHILIZED, FOR SOLUTION INTRAMUSCULAR; INTRAVENOUS EVERY 24 HOURS
Qty: 0 | Refills: 0 | Status: DISCONTINUED | OUTPATIENT
Start: 2017-12-27 | End: 2017-12-29

## 2017-12-26 RX ADMIN — Medication 325 MILLIGRAM(S): at 16:21

## 2017-12-26 RX ADMIN — SODIUM CHLORIDE 1000 MILLILITER(S): 9 INJECTION INTRAMUSCULAR; INTRAVENOUS; SUBCUTANEOUS at 11:11

## 2017-12-26 RX ADMIN — SODIUM CHLORIDE 3 MILLILITER(S): 9 INJECTION INTRAMUSCULAR; INTRAVENOUS; SUBCUTANEOUS at 07:51

## 2017-12-26 RX ADMIN — HEPARIN SODIUM 5000 UNIT(S): 5000 INJECTION INTRAVENOUS; SUBCUTANEOUS at 16:23

## 2017-12-26 RX ADMIN — PIPERACILLIN AND TAZOBACTAM 200 GRAM(S): 4; .5 INJECTION, POWDER, LYOPHILIZED, FOR SOLUTION INTRAVENOUS at 09:03

## 2017-12-26 RX ADMIN — Medication 50 MILLIGRAM(S): at 17:11

## 2017-12-26 RX ADMIN — TAMSULOSIN HYDROCHLORIDE 0.4 MILLIGRAM(S): 0.4 CAPSULE ORAL at 21:32

## 2017-12-26 RX ADMIN — Medication 100 MILLIGRAM(S): at 09:02

## 2017-12-26 RX ADMIN — HEPARIN SODIUM 5000 UNIT(S): 5000 INJECTION INTRAVENOUS; SUBCUTANEOUS at 21:32

## 2017-12-26 RX ADMIN — ATORVASTATIN CALCIUM 40 MILLIGRAM(S): 80 TABLET, FILM COATED ORAL at 21:32

## 2017-12-26 RX ADMIN — Medication 40 MILLIGRAM(S): at 07:50

## 2017-12-26 RX ADMIN — Medication 250 MILLIGRAM(S): at 07:53

## 2017-12-26 RX ADMIN — Medication 50 MILLILITER(S): at 07:10

## 2017-12-26 RX ADMIN — ERTAPENEM SODIUM 100 MILLIGRAM(S): 1 INJECTION, POWDER, LYOPHILIZED, FOR SOLUTION INTRAMUSCULAR; INTRAVENOUS at 16:23

## 2017-12-26 NOTE — ED ADULT NURSE REASSESSMENT NOTE - NS ED NURSE REASSESS COMMENT FT1
Pt states he feels much better in regards to his breathing.  Pt remains in the hyperthermia blanket.  Family at bedside.  VSS.  Hitchcock cathter in place draining clear davonte urine.  Will continue to monitor.
Pt sleeping at this time, family at bedside, temperature has improved significantly.  Hyperthermia blanket taken off, 3 blankets applied to keep pt warm.  Daughter at bedside.  Pt waiting for in-house doctor's orders.  CM in place, NSR.

## 2017-12-26 NOTE — ED ADULT NURSE NOTE - CAS DISCH CONDITION
Pt alert and oriented to person, being transported to 4 brkt.  VSS.  CM in plce.  NSR.  Hitchcock cath in place draining cloudy davonte urine./Improved

## 2017-12-26 NOTE — ED PROVIDER NOTE - MEDICAL DECISION MAKING DETAILS
Will treat sepsis, diuresis, passively warm patient, stress dose, steroids, and abx Pt significantly more comfortable on bipap, no respiratory distress. normal lactate, no leukocytosis, and not tachycardic. I believe the tachypnea to be secondary to his effusion/chf and not indicative of sepsis.   Given these findings and the severe pulmonary edema, the full 30 cc/kg bolus was aborted. Pt to be admitted for CHF, uti, effusion

## 2017-12-26 NOTE — H&P ADULT - NSHPPHYSICALEXAM_GEN_ALL_CORE
Vital Signs Last 24 Hrs  T(C): 36 (12-26-17 @ 11:09), Max: 36 (12-26-17 @ 11:09)  T(F): 96.8 (12-26-17 @ 11:09), Max: 96.8 (12-26-17 @ 11:09)  HR: 67 (12-26-17 @ 11:33) (59 - 74)  BP: 133/62 (12-26-17 @ 11:09) (131/60 - 143/62)  BP(mean): --  RR: 20 (12-26-17 @ 11:09) (18 - 32)  SpO2: 100% (12-26-17 @ 11:33) (84% - 100%)    PHYSICAL EXAM-  GENERAL: lethargic, answers simple questions, on BIPAP  HEAD:  Atraumatic, Normocephalic  EYES: EOMI,  conjunctiva and sclera clear  NECK: Supple   CHEST/LUNG: basilar crackles   HEART: Regular rate and rhythm; No murmurs   ABDOMEN: Soft, Nontender, Nondistended; Bowel sounds present  EXTREMITIES: wwp, No clubbing, cyanosis, or edema   NERVOUS SYSTEM:  nonfocal, lethargic, answers simple questions  SKIN: No rashes or lesions  PSYCH: normal affect

## 2017-12-26 NOTE — H&P ADULT - HISTORY OF PRESENT ILLNESS
83 y M hx DM2 on insulin, HTN, CKD, PAD s/p L TMA, s/p ppm,  transferred from [facility] for dyspnea, hypothermia. 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 He was last hospitalized in Nov for C diff colitis and ESBL E coli UTI. He is nonambulatory and requires assistance w ADLs.     In the ED, he was noted to be hypothermic and hypoglycemic, in respiratory distress, placed on BIPAP and warming blanket. Given dextrose w improvement in BS. CXR, CT chest showed evidence of pulmonary edema, mod pleural effusions, w possible LLL infiltrate. Also noted to have UTI, BNP 30K. Given Lasix, Vanc, Zosyn in ED and post placed. patient is DNR/DNI per his daughter at bedside. Patient reports feeling better, less SOB. Denies CP.

## 2017-12-26 NOTE — ED ADULT NURSE NOTE - OBJECTIVE STATEMENT
Code Sepsis Initiated upon arrival to the ED. Code Team 1 @ the bedside. Pt received sitting on stretcher in resp distress Pt complaining of SOB x 3 days with increased respirations using accessory muscles and crackles B/L. Chest pain longer than 3 days. Pt placed on bipap. Denies Nausea, Vomiting, Diarrhea. Skin warm, dry, color appropriate for age and race.  Martin @ bedside for translation.

## 2017-12-26 NOTE — ED ADULT TRIAGE NOTE - CHIEF COMPLAINT QUOTE
Pt with difficulty breathing worsening over last three days, c/o chest pain "more than three days", pt with audible crackles, pt rec'd combi tx prior to arrival

## 2017-12-26 NOTE — ED ADULT NURSE NOTE - CAS EDN DISCHARGE ASSESSMENT
Symptoms improved/Patient baseline mental status/Awake/Dressing clean and dry/No adverse reaction to first time med in ED

## 2017-12-26 NOTE — ED PROVIDER NOTE - OBJECTIVE STATEMENT
84 y/o M with past hx of CRI, PVD, pacemaker, HTN, and DM presents to the ED c/o SOB which onset 3 days ago. Pt states that he is currently experiencing chest pain and that he has been experiencing the chest pain longer than the SOB. He states that he had a large pleural effusion on 12/14. No further complaints at this time.   Code Sepsis called

## 2017-12-26 NOTE — H&P ADULT - ASSESSMENT
83 y M hx DM, CKD, PAD s/p L TMA, s/p PPM, recent adm for C diff, ESBL UTI, now admitted for SOB, CHF, hypothermia, r/o sepsis.     r/o sepsis: Evidence of UTI+/-  LLL PNA, started on Invanz for hx ESBL, given dose of vanc in ED. ID consulted. f/u cultures. Monitor temp curve. BP stable.   Dyspnea: cont diuresis for CHF, BNP 30 K, check echo. Monitor I/Os, post in place, monitor Cr, lytes. On abx for possible PNA. Wean BIPAP as tolerated. Pulmonary to evaluate. Monitor in SDU.   CKD: renally dose meds. Cr at baseline  Hypoglycemia: hold insulin, monitor FS.   PAD: local wound care, hx LLE ulcer    Prophyl: hep sq  DNR/DNI, MOLST in chart. Spoke w daughter at bedside, she is agreeable to IV medications however no invasive procedures unless absolutely necessary. No feeding tubes.

## 2017-12-26 NOTE — ED PROVIDER NOTE - CARE PLAN
Principal Discharge DX:	Acute respiratory failure, unspecified whether with hypoxia or hypercapnia  Secondary Diagnosis:	Acute on chronic congestive heart failure, unspecified congestive heart failure type  Secondary Diagnosis:	Acute cystitis without hematuria

## 2017-12-26 NOTE — ED PROVIDER NOTE - SECONDARY DIAGNOSIS.
Acute on chronic congestive heart failure, unspecified congestive heart failure type Acute cystitis without hematuria

## 2017-12-26 NOTE — H&P ADULT - NSHPLABSRESULTS_GEN_ALL_CORE
CT chest:   IMPRESSION: Findings in the lungs as described, favor pulmonary edema.   Superimposed pneumonia may be present as suspected clinically.    EKG: AV paced

## 2017-12-27 DIAGNOSIS — N30.00 ACUTE CYSTITIS WITHOUT HEMATURIA: ICD-10-CM

## 2017-12-27 DIAGNOSIS — J90 PLEURAL EFFUSION, NOT ELSEWHERE CLASSIFIED: ICD-10-CM

## 2017-12-27 DIAGNOSIS — J96.00 ACUTE RESPIRATORY FAILURE, UNSPECIFIED WHETHER WITH HYPOXIA OR HYPERCAPNIA: ICD-10-CM

## 2017-12-27 LAB
ANION GAP SERPL CALC-SCNC: 15 MMOL/L — SIGNIFICANT CHANGE UP (ref 5–17)
BUN SERPL-MCNC: 33 MG/DL — HIGH (ref 8–20)
CALCIUM SERPL-MCNC: 8.3 MG/DL — LOW (ref 8.6–10.2)
CHLORIDE SERPL-SCNC: 105 MMOL/L — SIGNIFICANT CHANGE UP (ref 98–107)
CO2 SERPL-SCNC: 23 MMOL/L — SIGNIFICANT CHANGE UP (ref 22–29)
CREAT SERPL-MCNC: 2.4 MG/DL — HIGH (ref 0.5–1.3)
GAS PNL BLDA: SIGNIFICANT CHANGE UP
GLUCOSE BLDC GLUCOMTR-MCNC: 148 MG/DL — HIGH (ref 70–99)
GLUCOSE BLDC GLUCOMTR-MCNC: 200 MG/DL — HIGH (ref 70–99)
GLUCOSE BLDC GLUCOMTR-MCNC: 75 MG/DL — SIGNIFICANT CHANGE UP (ref 70–99)
GLUCOSE BLDC GLUCOMTR-MCNC: 78 MG/DL — SIGNIFICANT CHANGE UP (ref 70–99)
GLUCOSE BLDC GLUCOMTR-MCNC: 81 MG/DL — SIGNIFICANT CHANGE UP (ref 70–99)
GLUCOSE BLDC GLUCOMTR-MCNC: 93 MG/DL — SIGNIFICANT CHANGE UP (ref 70–99)
GLUCOSE SERPL-MCNC: 94 MG/DL — SIGNIFICANT CHANGE UP (ref 70–115)
HCT VFR BLD CALC: 28.6 % — LOW (ref 42–52)
HGB BLD-MCNC: 9.1 G/DL — LOW (ref 14–18)
MCHC RBC-ENTMCNC: 28.3 PG — SIGNIFICANT CHANGE UP (ref 27–31)
MCHC RBC-ENTMCNC: 31.8 G/DL — LOW (ref 32–36)
MCV RBC AUTO: 89.1 FL — SIGNIFICANT CHANGE UP (ref 80–94)
PLATELET # BLD AUTO: 315 K/UL — SIGNIFICANT CHANGE UP (ref 150–400)
POTASSIUM SERPL-MCNC: 4.1 MMOL/L — SIGNIFICANT CHANGE UP (ref 3.5–5.3)
POTASSIUM SERPL-SCNC: 4.1 MMOL/L — SIGNIFICANT CHANGE UP (ref 3.5–5.3)
PROCALCITONIN SERPL-MCNC: 0.05 NG/ML — HIGH (ref 0–0.04)
RBC # BLD: 3.21 M/UL — LOW (ref 4.6–6.2)
RBC # FLD: 16.4 % — HIGH (ref 11–15.6)
SODIUM SERPL-SCNC: 143 MMOL/L — SIGNIFICANT CHANGE UP (ref 135–145)
WBC # BLD: 7.6 K/UL — SIGNIFICANT CHANGE UP (ref 4.8–10.8)
WBC # FLD AUTO: 7.6 K/UL — SIGNIFICANT CHANGE UP (ref 4.8–10.8)

## 2017-12-27 PROCEDURE — 99233 SBSQ HOSP IP/OBS HIGH 50: CPT

## 2017-12-27 PROCEDURE — 93306 TTE W/DOPPLER COMPLETE: CPT | Mod: 26

## 2017-12-27 PROCEDURE — 99223 1ST HOSP IP/OBS HIGH 75: CPT

## 2017-12-27 PROCEDURE — 71010: CPT | Mod: 26

## 2017-12-27 RX ORDER — SACCHAROMYCES BOULARDII 250 MG
250 POWDER IN PACKET (EA) ORAL
Qty: 0 | Refills: 0 | Status: DISCONTINUED | OUTPATIENT
Start: 2017-12-27 | End: 2017-12-29

## 2017-12-27 RX ADMIN — Medication 250 MILLIGRAM(S): at 19:23

## 2017-12-27 RX ADMIN — HEPARIN SODIUM 5000 UNIT(S): 5000 INJECTION INTRAVENOUS; SUBCUTANEOUS at 06:12

## 2017-12-27 RX ADMIN — ATORVASTATIN CALCIUM 40 MILLIGRAM(S): 80 TABLET, FILM COATED ORAL at 21:24

## 2017-12-27 RX ADMIN — TAMSULOSIN HYDROCHLORIDE 0.4 MILLIGRAM(S): 0.4 CAPSULE ORAL at 21:24

## 2017-12-27 RX ADMIN — ERTAPENEM SODIUM 100 MILLIGRAM(S): 1 INJECTION, POWDER, LYOPHILIZED, FOR SOLUTION INTRAMUSCULAR; INTRAVENOUS at 11:41

## 2017-12-27 RX ADMIN — HEPARIN SODIUM 5000 UNIT(S): 5000 INJECTION INTRAVENOUS; SUBCUTANEOUS at 21:23

## 2017-12-27 RX ADMIN — HEPARIN SODIUM 5000 UNIT(S): 5000 INJECTION INTRAVENOUS; SUBCUTANEOUS at 15:27

## 2017-12-27 RX ADMIN — Medication 50 MILLIGRAM(S): at 06:12

## 2017-12-27 RX ADMIN — Medication 325 MILLIGRAM(S): at 11:40

## 2017-12-27 RX ADMIN — Medication 40 MILLIGRAM(S): at 06:12

## 2017-12-27 RX ADMIN — PANTOPRAZOLE SODIUM 40 MILLIGRAM(S): 20 TABLET, DELAYED RELEASE ORAL at 06:12

## 2017-12-27 RX ADMIN — Medication 50 MILLIGRAM(S): at 19:23

## 2017-12-27 NOTE — CONSULT NOTE ADULT - PROBLEM SELECTOR RECOMMENDATION 4
UA not strongly suggestive of UTI  Urine cx with GNR  Continue ertapenem for now  Remove Hitchcock if possible  Needs eventual urology eval

## 2017-12-27 NOTE — CONSULT NOTE ADULT - ASSESSMENT
84 y/o M came in with hypothermia s/p warming blanket, respiratory distress s/p BIPAP CT reporting Pulm edema cannot rule out pneumonia

## 2017-12-27 NOTE — CONSULT NOTE ADULT - PROBLEM SELECTOR RECOMMENDATION 9
s/p BIPAP  Not in respiratory distress now  CT with pulm edema cannot rule out pneumonia  Check CXR and procalcitonin

## 2017-12-27 NOTE — CONSULT NOTE ADULT - PROBLEM SELECTOR RECOMMENDATION 2
Blood cultures pending  leukocytosis resolved  Check CXR and procalcitonin  Continue ertapenem for now

## 2017-12-27 NOTE — CONSULT NOTE ADULT - SUBJECTIVE AND OBJECTIVE BOX
[facility]  INFECTIOUS DISEASES AND INTERNAL MEDICINE at [place]  =======================================================  Selvin Merlos MD  Diplomates American Board of Internal Medicine and Infectious Diseases  =======================================================      [id number]  JUAN MIGUEL PARADA   Seen with , patient not a reliable historian, history obtained from the chart.     CC: Patient is a 83y old  Male who presents with a chief complaint of SOB (26 Dec 2017 14:16)    82y/o  Male with h/o DM2, HTN, CKD, PAD s/p L TMA, s/p ppm,  transferred from [facility] for dyspnea, hypothermia yesterday. The patient had a significant decline since his foot amputation in August, and has been in and out of the hospital. He was last hospitalized in 2017 with C diff colitis and ESBL E coli UTI. In the ER patient was hypothermic, placed on a warming blanket and BIPAP due to respiratory distress. CT Chest reporting Pulmonary edema, and underlying Pneumonia cannot be ruled out. Patient started on IV Ertapenem. ID input requested.       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      Social Hx:  Unable to obtain, patient is not a reliable historian      FAMILY HISTORY:  No pertinent family history in first degree relatives      Allergies  No Known Allergies      Antibiotics:  ertapenem  IVPB 500 milliGRAM(s) IV Intermittent every 24 hours       REVIEW OF SYSTEMS:  Unable to obtain, patient is not a reliable historian      Physical Exam:  Vital Signs Last 24 Hrs  T(C): 36.6 (27 Dec 2017 09:03), Max: 36.6 (26 Dec 2017 23:29)  T(F): 97.9 (27 Dec 2017 09:03), Max: 97.9 (26 Dec 2017 23:29)  HR: 67 (27 Dec 2017 06:09) (61 - 72)  BP: 121/54 (27 Dec 2017 08:00) (121/54 - 143/69)  BP(mean): 75 (27 Dec 2017 08:00) (71 - 91)  RR: 18 (27 Dec 2017 08:00) (16 - 23)  SpO2: 98% (27 Dec 2017 08:00) (91% - 100%)  Height (cm): 170.18 ( @ 14:00)  Weight (kg): 66 ( @ 14:00)  BMI (kg/m2): 22.8 ( @ 14:00)  BSA (m2): 1.77 ( @ 14:00)      GEN: NAD, pleasant  HEENT: normocephalic and atraumatic. EOMI. PERRL.    NECK: Supple.  LUNGS: Clear to auscultation.  HEART: Regular rate and rhythm   ABDOMEN: Soft, nontender, and nondistended.  Positive bowel sounds.    : + Hitchcock  EXTREMITIES: Lt TMA site clean, no sign of infection  MSK: No joint swelling  NEUROLOGIC: Confused, alert and oriented x Person  PSYCHIATRIC: Confused  SKIN: No rash        Labs:      143  |  105  |  33.0<H>  ----------------------------<  94  4.1   |  23.0  |  2.40<H>    Ca    8.3<L>      27 Dec 2017 07:13    TPro  7.4  /  Alb  3.2<L>  /  TBili  0.2<L>  /  DBili  x   /  AST  22  /  ALT  16  /  AlkPhos  150<H>                            9.1    7.6   )-----------( 315      ( 27 Dec 2017 07:13 )             28.6       PT/INR - ( 26 Dec 2017 08:36 )   PT: 13.7 sec;   INR: 1.24 ratio         PTT - ( 26 Dec 2017 08:36 )  PTT:30.1 sec  Urinalysis Basic - ( 26 Dec 2017 09:33 )    Color: Yellow / Appearance: Clear / S.015 / pH: x  Gluc: x / Ketone: Negative  / Bili: Negative / Urobili: Negative mg/dL   Blood: x / Protein: 100 mg/dL / Nitrite: Negative   Leuk Esterase: Moderate / RBC: 0-2 /HPF / WBC 11-25   Sq Epi: x / Non Sq Epi: Few / Bacteria: Moderate      LIVER FUNCTIONS - ( 26 Dec 2017 07:33 )  Alb: 3.2 g/dL / Pro: 7.4 g/dL / ALK PHOS: 150 U/L / ALT: 16 U/L / AST: 22 U/L / GGT: x           CARDIAC MARKERS ( 26 Dec 2017 13:50 )  x     / 0.04 ng/mL / x     / x     / x      CARDIAC MARKERS ( 26 Dec 2017 07:33 )  x     / 0.06 ng/mL / x     / x     / x          RECENT CULTURES:   @ 09:33 .Urine Catheterized     >100,000 CFU/ml Gram Negative Rods Identification and susceptibility to  follow.  Culture in progress      EXAM:  CT CHEST                        PROCEDURE DATE:  2017    INTERPRETATION:  CLINICAL INFORMATION: Fever  COMPARISON:   PROCEDURE:   CT of the Chest was performed without intravenous contrast.  Sagittal and coronal reformats were performed.  FINDINGS:  CHEST:   LUNGS AND LARGE AIRWAYS: Patent central airways.  Bilateral interstitial   and groundglass opacities.  PLEURA: Moderate pleural effusions.  VESSELS: Within normal limits.  HEART: Cardiomegaly. Trace pericardial effusion/pericardial thickening.   Cardiac pacer wires are seen.  MEDIASTINUM AND LOC: No lymphadenopathy.  CHEST WALL AND LOWER NECK: Within normal limits.  VISUALIZED UPPER ABDOMEN: Left lobe hypodensity to small to characterize.  BONES: Within normal limits.  IMPRESSION:   Findings in the lungs as described, favor pulmonary edema.   Superimposed pneumonia may be present as suspected clinically.      EXAM:  CHEST SINGLE VIEW FRONTAL                        PROCEDURE DATE:  2017    INTERPRETATION:  Portable chest radiograph      CLINICAL INFORMATION:   Short of breath.  TECHNIQUE:  Portable  AP view of the chest was obtained.  COMPARISON: 11/15/2017 available for review.  FINDINGS:   There is cardiomegaly, vascular congestion and perihilar interstitial   infiltrates, and bilateral effusions left greater than right and the left   lower lobe opacification.. Constellation of findings suggestive of CHF.   Superimposed infectious infiltrate cannot be excluded.  Cardiac device wire leads are within right atrium and right ventricle.   Trachea midline. No hilar mass.   Visualized osseous structures are intact.  IMPRESSION:   Cardiomegaly and interstitial infiltrates vascular congestion   and left lower lobe airspace consolidation with bilateral effusions as   described

## 2017-12-27 NOTE — CONSULT NOTE ADULT - PROBLEM SELECTOR RECOMMENDATION 3
CT with pleural effusion  Obtain TTE   Will repeat CXR to check if resolved since respiratory status is improved

## 2017-12-27 NOTE — PROGRESS NOTE ADULT - ASSESSMENT
83 y M hx DM2 on insulin, HTN, CKD, PAD s/p L TMA, s/p ppm,  was sent o ED from [facility] for dyspnea, hypothermia. In the ED, he was noted to be hypothermic and hypoglycemic, in respiratory distress, placed on BIPAP and warming blanket. Given dextrose w improvement in BS. CXR, CT chest showed evidence of pulmonary edema, moderate pleural effusions, w possible LLL infiltrate. Pt also complained of burning urination and noted to have UTI, BNP 30K. Lasix, Vanc, Zosyn given in ED, post placed and patient admitted to medicine for possible PNA and UTI, r/o CHF.   Off note, Patient is DNR/DNI and MOLST from in charts. 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 He was last hospitalized in Nov for C diff colitis and ESBL E coli UTI. He is nonambulatory and requires assistance w ADLs.     Plan:    Acute respiratory failure with pleural effusion and complex PNA:  - S/p BIPAP, Pt clinically improved. On NC O2 now, responding appropriately mental status normal. Pt not septic.  - C/w IV antibiotics. ID consult appreciated  - Pending repeat CXR and TTE today. Procalcitonin ordered.  - Pulmonary consult called, pending eval.    R/o new onset CHF:  - Pt presented with respiratory distress, congestion on CXR, high BNP.   - Keep on CHF pathway, I/Os, daily weight, fluid restriction, IV lasix   - TTE pending.     UTI:  - Pt c/o burning and pain with urination, UA shows WBC /bacteria and +ve LE  - On IV abx, c/w it. F/u cx  - ID on board.    CKD-III:   - Renally dose meds. Cr at baseline    Hx of DM-II, now hypoglycemia  - Hypoglycemia likely from ppor oral intake. BG better now, continue to monitor.   - Hold insulin, c/w FS monitoring.       HTN:  - C/w Metoprolol now (At home on metoprolol and Norvasc).  - Monitor BP- resume Norvasc as permitted.    PAD s/p left TMA:  - Site alisa, c/w Local wound care  - C/w ASA and Statins    BPH:  - Resume Flomax.    DVT ppx:  - Heparin 83 y M hx DM2 on insulin, HTN, CKD, PAD s/p L TMA, s/p ppm,  was sent o ED from [facility] for dyspnea, hypothermia. In the ED, he was noted to be hypothermic and hypoglycemic, in respiratory distress, placed on BIPAP and warming blanket. Given dextrose w improvement in BS. CXR, CT chest showed evidence of pulmonary edema, moderate pleural effusions, w possible LLL infiltrate. Pt also complained of burning urination and noted to have UTI, BNP 30K. Lasix, Vanc, Zosyn given in ED, post placed and patient admitted to medicine for possible PNA and UTI, r/o CHF.   Off note, Patient is DNR/DNI and MOLST from in charts. 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 He was last hospitalized in Nov for C diff colitis and ESBL E coli UTI. He is nonambulatory and requires assistance w ADLs.     Plan:    Acute respiratory failure with pleural effusion and complex PNA:  - S/p BIPAP, Pt clinically improved. On NC O2 now, responding to question. Pt not septic.  - C/w IV antibiotics. ID consult appreciated  - Pending repeat CXR and TTE today. Procalcitonin ordered.  - Pulmonary consult called, pending eval.    R/o new onset CHF:  - Pt presented with respiratory distress, congestion on CXR, high BNP.   - Keep on CHF pathway, I/Os, daily weight, fluid restriction, IV lasix   - TTE pending.     UTI:  - Pt c/o burning and pain with urination, UA shows WBC /bacteria and +ve LE  - On IV abx, c/w it. F/u cx  - ID on board.    CKD-III:   - Renally dose meds. Cr at baseline    Hx of DM-II, now hypoglycemia  - Hypoglycemia likely from ppor oral intake. BG better now, continue to monitor.   - Hold insulin, c/w FS monitoring.       HTN:  - C/w Metoprolol now (At home on metoprolol and Norvasc).  - Monitor BP- resume Norvasc as permitted.    PAD s/p left TMA:  - Site alisa, c/w Local wound care  - C/w ASA and Statins    BPH:  - Resume Flomax.    DVT ppx:  - Heparin

## 2017-12-28 LAB
-  AMIKACIN: SIGNIFICANT CHANGE UP
-  AMIKACIN: SIGNIFICANT CHANGE UP
-  AMPICILLIN/SULBACTAM: SIGNIFICANT CHANGE UP
-  AMPICILLIN/SULBACTAM: SIGNIFICANT CHANGE UP
-  AMPICILLIN: SIGNIFICANT CHANGE UP
-  AMPICILLIN: SIGNIFICANT CHANGE UP
-  AZTREONAM: SIGNIFICANT CHANGE UP
-  AZTREONAM: SIGNIFICANT CHANGE UP
-  CEFAZOLIN: SIGNIFICANT CHANGE UP
-  CEFAZOLIN: SIGNIFICANT CHANGE UP
-  CEFEPIME: SIGNIFICANT CHANGE UP
-  CEFEPIME: SIGNIFICANT CHANGE UP
-  CEFOXITIN: SIGNIFICANT CHANGE UP
-  CEFOXITIN: SIGNIFICANT CHANGE UP
-  CEFTAZIDIME: SIGNIFICANT CHANGE UP
-  CEFTAZIDIME: SIGNIFICANT CHANGE UP
-  CEFTRIAXONE: SIGNIFICANT CHANGE UP
-  CEFTRIAXONE: SIGNIFICANT CHANGE UP
-  CIPROFLOXACIN: SIGNIFICANT CHANGE UP
-  CIPROFLOXACIN: SIGNIFICANT CHANGE UP
-  ERTAPENEM: SIGNIFICANT CHANGE UP
-  ERTAPENEM: SIGNIFICANT CHANGE UP
-  GENTAMICIN: SIGNIFICANT CHANGE UP
-  GENTAMICIN: SIGNIFICANT CHANGE UP
-  IMIPENEM: SIGNIFICANT CHANGE UP
-  LEVOFLOXACIN: SIGNIFICANT CHANGE UP
-  LEVOFLOXACIN: SIGNIFICANT CHANGE UP
-  MEROPENEM: SIGNIFICANT CHANGE UP
-  MEROPENEM: SIGNIFICANT CHANGE UP
-  NITROFURANTOIN: SIGNIFICANT CHANGE UP
-  NITROFURANTOIN: SIGNIFICANT CHANGE UP
-  PIPERACILLIN/TAZOBACTAM: SIGNIFICANT CHANGE UP
-  PIPERACILLIN/TAZOBACTAM: SIGNIFICANT CHANGE UP
-  TOBRAMYCIN: SIGNIFICANT CHANGE UP
-  TOBRAMYCIN: SIGNIFICANT CHANGE UP
-  TRIMETHOPRIM/SULFAMETHOXAZOLE: SIGNIFICANT CHANGE UP
-  TRIMETHOPRIM/SULFAMETHOXAZOLE: SIGNIFICANT CHANGE UP
ANION GAP SERPL CALC-SCNC: 14 MMOL/L — SIGNIFICANT CHANGE UP (ref 5–17)
BUN SERPL-MCNC: 29 MG/DL — HIGH (ref 8–20)
CALCIUM SERPL-MCNC: 8.6 MG/DL — SIGNIFICANT CHANGE UP (ref 8.6–10.2)
CHLORIDE SERPL-SCNC: 103 MMOL/L — SIGNIFICANT CHANGE UP (ref 98–107)
CO2 SERPL-SCNC: 25 MMOL/L — SIGNIFICANT CHANGE UP (ref 22–29)
CREAT SERPL-MCNC: 2.14 MG/DL — HIGH (ref 0.5–1.3)
CULTURE RESULTS: NO GROWTH — SIGNIFICANT CHANGE UP
CULTURE RESULTS: SIGNIFICANT CHANGE UP
CULTURE RESULTS: SIGNIFICANT CHANGE UP
GLUCOSE BLDC GLUCOMTR-MCNC: 100 MG/DL — HIGH (ref 70–99)
GLUCOSE BLDC GLUCOMTR-MCNC: 140 MG/DL — HIGH (ref 70–99)
GLUCOSE BLDC GLUCOMTR-MCNC: 145 MG/DL — HIGH (ref 70–99)
GLUCOSE BLDC GLUCOMTR-MCNC: 186 MG/DL — HIGH (ref 70–99)
GLUCOSE BLDC GLUCOMTR-MCNC: 243 MG/DL — HIGH (ref 70–99)
GLUCOSE SERPL-MCNC: 167 MG/DL — HIGH (ref 70–115)
HCT VFR BLD CALC: 30.6 % — LOW (ref 42–52)
HGB BLD-MCNC: 9.7 G/DL — LOW (ref 14–18)
MCHC RBC-ENTMCNC: 28.4 PG — SIGNIFICANT CHANGE UP (ref 27–31)
MCHC RBC-ENTMCNC: 31.7 G/DL — LOW (ref 32–36)
MCV RBC AUTO: 89.5 FL — SIGNIFICANT CHANGE UP (ref 80–94)
METHOD TYPE: SIGNIFICANT CHANGE UP
METHOD TYPE: SIGNIFICANT CHANGE UP
ORGANISM # SPEC MICROSCOPIC CNT: SIGNIFICANT CHANGE UP
PLATELET # BLD AUTO: 386 K/UL — SIGNIFICANT CHANGE UP (ref 150–400)
POTASSIUM SERPL-MCNC: 4.1 MMOL/L — SIGNIFICANT CHANGE UP (ref 3.5–5.3)
POTASSIUM SERPL-SCNC: 4.1 MMOL/L — SIGNIFICANT CHANGE UP (ref 3.5–5.3)
RBC # BLD: 3.42 M/UL — LOW (ref 4.6–6.2)
RBC # FLD: 16.5 % — HIGH (ref 11–15.6)
SODIUM SERPL-SCNC: 142 MMOL/L — SIGNIFICANT CHANGE UP (ref 135–145)
SPECIMEN SOURCE: SIGNIFICANT CHANGE UP
SPECIMEN SOURCE: SIGNIFICANT CHANGE UP
WBC # BLD: 8 K/UL — SIGNIFICANT CHANGE UP (ref 4.8–10.8)
WBC # FLD AUTO: 8 K/UL — SIGNIFICANT CHANGE UP (ref 4.8–10.8)

## 2017-12-28 PROCEDURE — 99233 SBSQ HOSP IP/OBS HIGH 50: CPT

## 2017-12-28 PROCEDURE — 99223 1ST HOSP IP/OBS HIGH 75: CPT

## 2017-12-28 PROCEDURE — 93010 ELECTROCARDIOGRAM REPORT: CPT

## 2017-12-28 RX ORDER — DEXTROSE 50 % IN WATER 50 %
1 SYRINGE (ML) INTRAVENOUS ONCE
Qty: 0 | Refills: 0 | Status: DISCONTINUED | OUTPATIENT
Start: 2017-12-28 | End: 2017-12-29

## 2017-12-28 RX ORDER — GLUCAGON INJECTION, SOLUTION 0.5 MG/.1ML
1 INJECTION, SOLUTION SUBCUTANEOUS ONCE
Qty: 0 | Refills: 0 | Status: DISCONTINUED | OUTPATIENT
Start: 2017-12-28 | End: 2017-12-29

## 2017-12-28 RX ORDER — DEXTROSE 50 % IN WATER 50 %
25 SYRINGE (ML) INTRAVENOUS ONCE
Qty: 0 | Refills: 0 | Status: DISCONTINUED | OUTPATIENT
Start: 2017-12-28 | End: 2017-12-29

## 2017-12-28 RX ORDER — SODIUM CHLORIDE 9 MG/ML
1000 INJECTION, SOLUTION INTRAVENOUS
Qty: 0 | Refills: 0 | Status: DISCONTINUED | OUTPATIENT
Start: 2017-12-28 | End: 2017-12-29

## 2017-12-28 RX ORDER — DEXTROSE 50 % IN WATER 50 %
12.5 SYRINGE (ML) INTRAVENOUS ONCE
Qty: 0 | Refills: 0 | Status: DISCONTINUED | OUTPATIENT
Start: 2017-12-28 | End: 2017-12-29

## 2017-12-28 RX ORDER — INSULIN LISPRO 100/ML
VIAL (ML) SUBCUTANEOUS
Qty: 0 | Refills: 0 | Status: DISCONTINUED | OUTPATIENT
Start: 2017-12-28 | End: 2017-12-29

## 2017-12-28 RX ADMIN — TAMSULOSIN HYDROCHLORIDE 0.4 MILLIGRAM(S): 0.4 CAPSULE ORAL at 21:09

## 2017-12-28 RX ADMIN — Medication 50 MILLIGRAM(S): at 05:30

## 2017-12-28 RX ADMIN — Medication 50 MILLIGRAM(S): at 17:24

## 2017-12-28 RX ADMIN — Medication 40 MILLIGRAM(S): at 05:31

## 2017-12-28 RX ADMIN — HEPARIN SODIUM 5000 UNIT(S): 5000 INJECTION INTRAVENOUS; SUBCUTANEOUS at 21:09

## 2017-12-28 RX ADMIN — Medication 250 MILLIGRAM(S): at 17:24

## 2017-12-28 RX ADMIN — Medication: at 13:31

## 2017-12-28 RX ADMIN — ATORVASTATIN CALCIUM 40 MILLIGRAM(S): 80 TABLET, FILM COATED ORAL at 21:09

## 2017-12-28 RX ADMIN — Medication 325 MILLIGRAM(S): at 11:49

## 2017-12-28 RX ADMIN — HEPARIN SODIUM 5000 UNIT(S): 5000 INJECTION INTRAVENOUS; SUBCUTANEOUS at 05:30

## 2017-12-28 RX ADMIN — ERTAPENEM SODIUM 100 MILLIGRAM(S): 1 INJECTION, POWDER, LYOPHILIZED, FOR SOLUTION INTRAMUSCULAR; INTRAVENOUS at 11:49

## 2017-12-28 RX ADMIN — Medication 250 MILLIGRAM(S): at 05:36

## 2017-12-28 RX ADMIN — HEPARIN SODIUM 5000 UNIT(S): 5000 INJECTION INTRAVENOUS; SUBCUTANEOUS at 14:24

## 2017-12-28 RX ADMIN — PANTOPRAZOLE SODIUM 40 MILLIGRAM(S): 20 TABLET, DELAYED RELEASE ORAL at 05:30

## 2017-12-28 NOTE — PROGRESS NOTE ADULT - SUBJECTIVE AND OBJECTIVE BOX
KARMAJUAN MIGUEL Male 83y [id number]    Patient is a 83y old  Male who presents with a chief complaint of SOB (26 Dec 2017 14:16)      Subjective/objective:  Pt seen and examined at bedside, no over night event reported by night staff. Pt daughter and RN at bedside, he is awake /alert, knows he is in the hospital, remembers his birthday. He reports feeling well, denied any SOB and reports cough getting better. on oxygen- needs to wean off.    Review of system:  No fever, chills, nausea, vomiting, headache, dizziness, chest pain or palpitation.      PHYSICAL EXAM:    Vital Signs Last 24 Hrs  T(C): 36.7 (28 Dec 2017 12:23), Max: 36.7 (28 Dec 2017 05:00)  T(F): 98 (28 Dec 2017 12:23), Max: 98.1 (28 Dec 2017 05:00)  HR: 65 (28 Dec 2017 08:00) (60 - 65)  BP: 117/98 (28 Dec 2017 08:00) (117/98 - 149/71)  BP(mean): 72 (28 Dec 2017 05:28) (72 - 95)  RR: 17 (28 Dec 2017 08:00) (17 - 20)  SpO2: 100% (28 Dec 2017 08:00) (98% - 100%)    GENERAL: Pt lying comfortably, NAD.,   CHEST/LUNG: Clear to auscultation bilaterally; No wheezing.  HEART: S1S2+, Regular rate and rhythm; No murmurs.  ABDOMEN: Soft, Nontender, Nondistended; Bowel sounds present.  Extremities:  Lt TMA site clean, no sign of infection  NEURO: AAOX1, no focal deficits, no motor r sensory loss.  PSYCH: normal mood.      MEDICATIONS  (STANDING):  aspirin 325 milliGRAM(s) Oral daily  atorvastatin 40 milliGRAM(s) Oral at bedtime  dextrose 5%. 1000 milliLiter(s) (50 mL/Hr) IV Continuous <Continuous>  dextrose 50% Injectable 12.5 Gram(s) IV Push once  dextrose 50% Injectable 25 Gram(s) IV Push once  dextrose 50% Injectable 25 Gram(s) IV Push once  ertapenem  IVPB      ertapenem  IVPB 500 milliGRAM(s) IV Intermittent every 24 hours  furosemide   Injectable 40 milliGRAM(s) IV Push daily  heparin  Injectable 5000 Unit(s) SubCutaneous every 8 hours  insulin lispro (HumaLOG) corrective regimen sliding scale   SubCutaneous three times a day before meals  metoprolol     tartrate 50 milliGRAM(s) Oral two times a day  pantoprazole    Tablet 40 milliGRAM(s) Oral before breakfast  saccharomyces boulardii 250 milliGRAM(s) Oral two times a day  tamsulosin 0.4 milliGRAM(s) Oral at bedtime    MEDICATIONS  (PRN):  dextrose Gel 1 Dose(s) Oral once PRN Blood Glucose LESS THAN 70 milliGRAM(s)/deciliter  glucagon  Injectable 1 milliGRAM(s) IntraMuscular once PRN Glucose <70 milliGRAM(s)/deciLiter  glucagon  Injectable 1 milliGRAM(s) IntraMuscular once PRN Glucose LESS THAN 70 milligrams/deciliter        Labs:  LABS:                        9.7    8.0   )-----------( 386      ( 28 Dec 2017 10:02 )             30.6     12-28    142  |  103  |  29.0<H>  ----------------------------<  167<H>  4.1   |  25.0  |  2.14<H>    Ca    8.6      28 Dec 2017 10:02              ABG - ( 27 Dec 2017 14:43 )  pH: 7.36  /  pCO2: 46    /  pO2: 39    / HCO3: 24    / Base Excess: 0.6   /  SaO2: 71                CARDIAC MARKERS ( 26 Dec 2017 13:50 )  x     / 0.04 ng/mL / x     / x     / x

## 2017-12-28 NOTE — CONSULT NOTE ADULT - ASSESSMENT
83M hx DM2 on insulin, HTN, CKD, PAD s/p L TMA, s/p PPM  transferred from [facility] for dyspnea, hypothermia. CT chest showed mod pleural effusions, w possible LLL infiltrate.   Also noted to have UTI, BNP 30K.  Echo 12/217 showed  Left ventricular ejection fraction 35 to 40%.   Pt's breathing already improved Denied dizziness, chest pan, pressure, palpitation, nausea, vomiting, hematuria melena syncope, near syncope. He is sitting in bed and having food. Family is present.   patient is DNR/DNI per his daughter at bedside.  i d/w daughter/patient to have cardiac catheterization, stress test, ICD. Daughter stated that the pt is unwilling to proceed. He doesn't want invasive procedures. He wants only medical management  Alternatives indulging no treatment option, benefits and risks including but not limited to death, arrhythmia discussed.      1) Acute systolic congestive Heart failure with EF 35-40%, Pleural effusion. Diuretics   2) CMP, Newly diagnosed: Refused LHC, STX. furosemide  40mg IV Push daily. metoprolol  tartrate 50mg twice daily. ASA, Statin.  3) Pacemaker: telemetry EKG.   4) Acute respiratory failure with pleural effusion and complex PNA: BIPAP, supplement O2, IV abx  5) Diabetes: A1c , follow Serum Glu  6) CRI (chronic renal insufficiency), stage 3 (moderate): Monitor SCr, lytes  7) UTI: On IV abx, c/w it. F/u cx  8) HTN:  Stable on meds .  Monitor BP.  9) PAD s/p left TMA:  c/w Local wound care

## 2017-12-28 NOTE — PROGRESS NOTE ADULT - ASSESSMENT
83 y M PMHx DM2 on insulin, HTN, CKD, PAD s/p L TMA, s/p ppm, was sent o ED from [facility] for dyspnea, hypothermia. In the ED, he was noted to be hypothermic and hypoglycemic, in respiratory distress, placed on BIPAP and warming blanket on admission. Given dextrose w improvement in BS. CXR, CT chest showed evidence of pulmonary edema, moderate pleural effusions, w possible LLL infiltrate. Pt also complained of burning urination and noted to have UTI, BNP 30K. Lasix, Vanc, Zosyn given in ED, post placed and patient admitted to medicine for possible PNA and UTI, r/o CHF. Pt continued with Abx and ID recommendation appreciated. pt TTE shows reduced EF 35-40%, continued on CHF pathway. Cardiology consult placed.   Off note, Patient is DNR/DNI and MOLST from in charts. 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 He was last hospitalized in Nov for C diff colitis and ESBL E coli UTI. He is nonambulatory and requires assistance w ADLs.     Plan:    Acute respiratory failure with pleural effusion and complex PNA:  - S/p BIPAP, Pt clinically improved. On NC O2 now, responding appropriately. Pt not septic.  - C/w IV antibiotics. ID consult appreciated  - Procalcitonin mildly elevated, repeat CXR shows decreasing infiltrates and effusion. Echo shows reduced EF.    New onset systolic CHF:  - Pt presented with respiratory distress, congestion on CXR, high BNP. Echo shows reduced EF 35-40%  - Keep on CHF pathway, I/Os, daily weight, fluid restriction, IV lasix   - Cardiology consult called.    UTI:  - Pt c/o burning and pain with urination, UA shows WBC /bacteria and +ve LE  - On IV abx, c/w it. F/u cx  - ID on board.    CKD-III:   - Renally dose meds. Cr at baseline    Hx of DM-II, now hypoglycemia  - Hypoglycemia likely from poor oral intake. resolved. BG elevated today.    - start LSS with FS monitoring.       HTN:  - C/w Metoprolol now (At home on metoprolol and Norvasc).  - Monitor BP- resume Norvasc as permitted.    PAD s/p left TMA:  - Site clear, c/w Local wound care  - C/w ASA and Statins    BPH:  - Resume Flomax.    DVT ppx:  - Heparin

## 2017-12-29 ENCOUNTER — TRANSCRIPTION ENCOUNTER (OUTPATIENT)
Age: 82
End: 2017-12-29

## 2017-12-29 VITALS
RESPIRATION RATE: 15 BRPM | SYSTOLIC BLOOD PRESSURE: 137 MMHG | HEART RATE: 64 BPM | OXYGEN SATURATION: 95 % | DIASTOLIC BLOOD PRESSURE: 64 MMHG

## 2017-12-29 LAB
ANION GAP SERPL CALC-SCNC: 13 MMOL/L — SIGNIFICANT CHANGE UP (ref 5–17)
BUN SERPL-MCNC: 34 MG/DL — HIGH (ref 8–20)
CALCIUM SERPL-MCNC: 8.4 MG/DL — LOW (ref 8.6–10.2)
CHLORIDE SERPL-SCNC: 102 MMOL/L — SIGNIFICANT CHANGE UP (ref 98–107)
CO2 SERPL-SCNC: 27 MMOL/L — SIGNIFICANT CHANGE UP (ref 22–29)
CREAT SERPL-MCNC: 2.03 MG/DL — HIGH (ref 0.5–1.3)
GLUCOSE BLDC GLUCOMTR-MCNC: 155 MG/DL — HIGH (ref 70–99)
GLUCOSE BLDC GLUCOMTR-MCNC: 187 MG/DL — HIGH (ref 70–99)
GLUCOSE BLDC GLUCOMTR-MCNC: 296 MG/DL — HIGH (ref 70–99)
GLUCOSE SERPL-MCNC: 191 MG/DL — HIGH (ref 70–115)
MAGNESIUM SERPL-MCNC: 1.4 MG/DL — LOW (ref 1.6–2.6)
PHOSPHATE SERPL-MCNC: 2.3 MG/DL — LOW (ref 2.4–4.7)
POTASSIUM SERPL-MCNC: 4.1 MMOL/L — SIGNIFICANT CHANGE UP (ref 3.5–5.3)
POTASSIUM SERPL-SCNC: 4.1 MMOL/L — SIGNIFICANT CHANGE UP (ref 3.5–5.3)
SODIUM SERPL-SCNC: 142 MMOL/L — SIGNIFICANT CHANGE UP (ref 135–145)
TSH SERPL-MCNC: 0.85 UIU/ML — SIGNIFICANT CHANGE UP (ref 0.27–4.2)

## 2017-12-29 PROCEDURE — 87186 SC STD MICRODIL/AGAR DIL: CPT

## 2017-12-29 PROCEDURE — 99232 SBSQ HOSP IP/OBS MODERATE 35: CPT

## 2017-12-29 PROCEDURE — 99239 HOSP IP/OBS DSCHRG MGMT >30: CPT

## 2017-12-29 PROCEDURE — 87040 BLOOD CULTURE FOR BACTERIA: CPT

## 2017-12-29 PROCEDURE — 82435 ASSAY OF BLOOD CHLORIDE: CPT

## 2017-12-29 PROCEDURE — 84100 ASSAY OF PHOSPHORUS: CPT

## 2017-12-29 PROCEDURE — 96375 TX/PRO/DX INJ NEW DRUG ADDON: CPT

## 2017-12-29 PROCEDURE — 82803 BLOOD GASES ANY COMBINATION: CPT

## 2017-12-29 PROCEDURE — 84443 ASSAY THYROID STIM HORMONE: CPT

## 2017-12-29 PROCEDURE — 83735 ASSAY OF MAGNESIUM: CPT

## 2017-12-29 PROCEDURE — 96374 THER/PROPH/DIAG INJ IV PUSH: CPT

## 2017-12-29 PROCEDURE — 71250 CT THORAX DX C-: CPT

## 2017-12-29 PROCEDURE — 36415 COLL VENOUS BLD VENIPUNCTURE: CPT

## 2017-12-29 PROCEDURE — 93306 TTE W/DOPPLER COMPLETE: CPT

## 2017-12-29 PROCEDURE — 85730 THROMBOPLASTIN TIME PARTIAL: CPT

## 2017-12-29 PROCEDURE — 84145 PROCALCITONIN (PCT): CPT

## 2017-12-29 PROCEDURE — 85014 HEMATOCRIT: CPT

## 2017-12-29 PROCEDURE — 82330 ASSAY OF CALCIUM: CPT

## 2017-12-29 PROCEDURE — 85610 PROTHROMBIN TIME: CPT

## 2017-12-29 PROCEDURE — 84295 ASSAY OF SERUM SODIUM: CPT

## 2017-12-29 PROCEDURE — T1013: CPT

## 2017-12-29 PROCEDURE — 81001 URINALYSIS AUTO W/SCOPE: CPT

## 2017-12-29 PROCEDURE — 93005 ELECTROCARDIOGRAM TRACING: CPT

## 2017-12-29 PROCEDURE — 82962 GLUCOSE BLOOD TEST: CPT

## 2017-12-29 PROCEDURE — 80048 BASIC METABOLIC PNL TOTAL CA: CPT

## 2017-12-29 PROCEDURE — 84484 ASSAY OF TROPONIN QUANT: CPT

## 2017-12-29 PROCEDURE — 87086 URINE CULTURE/COLONY COUNT: CPT

## 2017-12-29 PROCEDURE — 94660 CPAP INITIATION&MGMT: CPT

## 2017-12-29 PROCEDURE — 99233 SBSQ HOSP IP/OBS HIGH 50: CPT

## 2017-12-29 PROCEDURE — 83880 ASSAY OF NATRIURETIC PEPTIDE: CPT

## 2017-12-29 PROCEDURE — 97162 PT EVAL MOD COMPLEX 30 MIN: CPT

## 2017-12-29 PROCEDURE — 82947 ASSAY GLUCOSE BLOOD QUANT: CPT

## 2017-12-29 PROCEDURE — 85027 COMPLETE CBC AUTOMATED: CPT

## 2017-12-29 PROCEDURE — 71045 X-RAY EXAM CHEST 1 VIEW: CPT

## 2017-12-29 PROCEDURE — 99291 CRITICAL CARE FIRST HOUR: CPT | Mod: 25

## 2017-12-29 PROCEDURE — 84132 ASSAY OF SERUM POTASSIUM: CPT

## 2017-12-29 PROCEDURE — 80053 COMPREHEN METABOLIC PANEL: CPT

## 2017-12-29 PROCEDURE — 83605 ASSAY OF LACTIC ACID: CPT

## 2017-12-29 RX ORDER — MAGNESIUM SULFATE 500 MG/ML
2 VIAL (ML) INJECTION ONCE
Qty: 0 | Refills: 0 | Status: COMPLETED | OUTPATIENT
Start: 2017-12-29 | End: 2017-12-29

## 2017-12-29 RX ORDER — FUROSEMIDE 40 MG
1 TABLET ORAL
Qty: 0 | Refills: 0 | COMMUNITY
Start: 2017-12-29

## 2017-12-29 RX ADMIN — Medication 6: at 11:31

## 2017-12-29 RX ADMIN — HEPARIN SODIUM 5000 UNIT(S): 5000 INJECTION INTRAVENOUS; SUBCUTANEOUS at 05:39

## 2017-12-29 RX ADMIN — Medication 2: at 16:47

## 2017-12-29 RX ADMIN — Medication 50 MILLIGRAM(S): at 05:40

## 2017-12-29 RX ADMIN — PANTOPRAZOLE SODIUM 40 MILLIGRAM(S): 20 TABLET, DELAYED RELEASE ORAL at 05:39

## 2017-12-29 RX ADMIN — Medication 50 MILLIGRAM(S): at 18:10

## 2017-12-29 RX ADMIN — Medication 50 GRAM(S): at 15:19

## 2017-12-29 RX ADMIN — Medication 62.5 MILLIMOLE(S): at 11:28

## 2017-12-29 RX ADMIN — Medication 250 MILLIGRAM(S): at 18:10

## 2017-12-29 RX ADMIN — Medication 50 GRAM(S): at 10:40

## 2017-12-29 RX ADMIN — Medication 325 MILLIGRAM(S): at 11:31

## 2017-12-29 RX ADMIN — Medication 2: at 08:07

## 2017-12-29 RX ADMIN — Medication 40 MILLIGRAM(S): at 05:39

## 2017-12-29 RX ADMIN — Medication 250 MILLIGRAM(S): at 05:39

## 2017-12-29 RX ADMIN — HEPARIN SODIUM 5000 UNIT(S): 5000 INJECTION INTRAVENOUS; SUBCUTANEOUS at 13:58

## 2017-12-29 NOTE — DISCHARGE NOTE ADULT - HOSPITAL COURSE
83 y M PMHx DM2 on insulin, HTN, CKD, PAD s/p L TMA, s/p ppm, was sent o ED from [facility] for dyspnea, hypothermia. In the ED, he was noted to be hypothermic and hypoglycemic, in respiratory distress, placed on BIPAP and warming blanket on admission. Given dextrose w improvement in BS. CXR, CT chest showed evidence of pulmonary edema, moderate pleural effusions, w possible LLL infiltrate. Pt also complained of burning urination and noted to have UTI, BNP 30K. Lasix, Vanc, Zosyn given in ED, post placed and patient admitted to medicine for possible PNA and UTI, r/o CHF. Pt continued with Abx and ID recommendation appreciated. Procalcitonin not suggestive on infection, cx negative so far- ID followed and and abx d/kate by ID. Pt had TTE shows reduced EF 35-40%, continued on CHF pathway. Cardiology consult placed and recommendation appreciated. Daughter refused for LHC /Stress test and ok with medical management. Pt on lasix /BB- not on ACEIs due to CKD. Cardiology recommended outpatient follow up for further medication adjustment. Acute respiratory failure likely from systolic CHF- resolved now, saturating 98% on RA.   Pt stable for back to NH with outpatient follow up. SW on board for placement back to NH.    PHYSICAL EXAM:    Vital Signs Last 24 Hrs  T(C): 36.7 (29 Dec 2017 11:51), Max: 36.8 (28 Dec 2017 20:52)  T(F): 98 (29 Dec 2017 11:51), Max: 98.2 (28 Dec 2017 20:52)  HR: 63 (29 Dec 2017 10:40) (60 - 68)  BP: 123/58 (29 Dec 2017 10:40) (123/58 - 140/63)  BP(mean): 91 (29 Dec 2017 05:38) (75 - 91)  RR: 16 (29 Dec 2017 10:40) (15 - 18)  SpO2: 94% (29 Dec 2017 10:40) (93% - 99%)    GENERAL: Pt lying comfortably, NAD.,   CHEST/LUNG: Clear to auscultation bilaterally; No wheezing.  HEART: S1S2+, Regular rate and rhythm; No murmurs.  ABDOMEN: Soft, Nontender, Nondistended; Bowel sounds present.  Extremities:  Lt TMA site clean, no sign of infection  NEURO: AAOX1, no focal deficits, no motor r sensory loss.  PSYCH: normal mood.    Total time on discharge 35 minutes.

## 2017-12-29 NOTE — PROGRESS NOTE ADULT - SUBJECTIVE AND OBJECTIVE BOX
CARDIOLOGY PROGRESS NOTE   ([facility])                                                                                                        Subjective:     Vitals:  T(C): 36.6 (12-29-17 @ 09:12), Max: 36.8 (12-28-17 @ 20:52)  HR: 67 (12-29-17 @ 08:13) (60 - 68)  BP: 123/74 (12-29-17 @ 08:13) (123/74 - 140/63)  RR: 15 (12-29-17 @ 08:13) (15 - 18)  SpO2: 93% (12-29-17 @ 08:13) (93% - 99%)  Wt(kg): --  I&O's Summary    28 Dec 2017 07:01  -  29 Dec 2017 07:00  --------------------------------------------------------  IN: 720 mL / OUT: 850 mL / NET: -130 mL          PHYSICAL EXAM:  Appearance: Normal	  HEENT:   Atraumatic  Cardiovascular: Normal S1 S2, No JVD, No murmurs, No edema  Respiratory: Lungs clear to auscultation	  Gastrointestinal:  Soft, Non-tender, + BS	  Skin: No rashes, No ecchymoses, No cyanosis  Neurologic: Alert and awake, able to move extremities  Extremities: No edema    TELEMETRY: 	    ECG:  	      DIAGNOSTIC TESTING:  [ ] Echocardiogram:  [ ]  Catheterization:  [ ] Stress Test:    OTHER: 	      CURRENT MEDICATIONS:  furosemide   Injectable 40 milliGRAM(s) IV Push daily  metoprolol     tartrate 50 milliGRAM(s) Oral two times a day  tamsulosin 0.4 milliGRAM(s) Oral at bedtime    ertapenem  IVPB      ertapenem  IVPB 500 milliGRAM(s) IV Intermittent every 24 hours      aspirin 325 milliGRAM(s) Oral daily    pantoprazole    Tablet 40 milliGRAM(s) Oral before breakfast    atorvastatin 40 milliGRAM(s) Oral at bedtime  dextrose 50% Injectable 12.5 Gram(s) IV Push once  dextrose 50% Injectable 25 Gram(s) IV Push once  dextrose 50% Injectable 25 Gram(s) IV Push once  dextrose Gel 1 Dose(s) Oral once PRN  glucagon  Injectable 1 milliGRAM(s) IntraMuscular once PRN  glucagon  Injectable 1 milliGRAM(s) IntraMuscular once PRN  insulin lispro (HumaLOG) corrective regimen sliding scale   SubCutaneous three times a day before meals    dextrose 5%. 1000 milliLiter(s) IV Continuous <Continuous>  heparin  Injectable 5000 Unit(s) SubCutaneous every 8 hours      LABS:	 	    CARDIAC MARKERS:  Troponin I:   CPK total =  CK MB=   CKMB index=                           9.7    8.0   )-----------( 386      ( 28 Dec 2017 10:02 )             30.6     12-29    142  |  102  |  34.0<H>  ----------------------------<  191<H>  4.1   |  27.0  |  2.03<H>    Ca    8.4<L>      29 Dec 2017 08:10  Phos  2.3     12-29  Mg     1.4     12-29      proBNP: Serum Pro-Brain Natriuretic Peptide: 82086 pg/mL (12-26 @ 08:19)    Lipid Profile:   HgA1c:   TSH: Thyroid Stimulating Hormone, Serum: 0.85 uIU/mL (12-29 @ 08:10) CARDIOLOGY PROGRESS NOTE   ([facility])                                                                                                        Subjective:  feels better, NAD, dneied CP, dyspnea    Vitals:  T(C): 36.6 (12-29-17 @ 09:12), Max: 36.8 (12-28-17 @ 20:52)  HR: 67 (12-29-17 @ 08:13) (60 - 68)  BP: 123/74 (12-29-17 @ 08:13) (123/74 - 140/63)  RR: 15 (12-29-17 @ 08:13) (15 - 18)  SpO2: 93% (12-29-17 @ 08:13) (93% - 99%)  Wt(kg): --  I&O's Summary    28 Dec 2017 07:01  -  29 Dec 2017 07:00  --------------------------------------------------------  IN: 720 mL / OUT: 850 mL / NET: -130 mL          PHYSICAL EXAM:  Appearance: Normal	  HEENT:   Atraumatic  Cardiovascular: Normal S1 S2, No JVD, , No edema  Respiratory: Lungs clear to auscultation	  Gastrointestinal:  Soft, Non-tender, + BS	  Skin: No rashes, No ecchymoses, No cyanosis  Neurologic: Alert and awake, able to move extremities  Extremities: No edema        CURRENT MEDICATIONS:  furosemide   Injectable 40 milliGRAM(s) IV Push daily  metoprolol     tartrate 50 milliGRAM(s) Oral two times a day  tamsulosin 0.4 milliGRAM(s) Oral at bedtime    ertapenem  IVPB      ertapenem  IVPB 500 milliGRAM(s) IV Intermittent every 24 hours      aspirin 325 milliGRAM(s) Oral daily    pantoprazole    Tablet 40 milliGRAM(s) Oral before breakfast    atorvastatin 40 milliGRAM(s) Oral at bedtime  dextrose 50% Injectable 12.5 Gram(s) IV Push once  dextrose 50% Injectable 25 Gram(s) IV Push once  dextrose 50% Injectable 25 Gram(s) IV Push once  dextrose Gel 1 Dose(s) Oral once PRN  glucagon  Injectable 1 milliGRAM(s) IntraMuscular once PRN  glucagon  Injectable 1 milliGRAM(s) IntraMuscular once PRN  insulin lispro (HumaLOG) corrective regimen sliding scale   SubCutaneous three times a day before meals    dextrose 5%. 1000 milliLiter(s) IV Continuous <Continuous>  heparin  Injectable 5000 Unit(s) SubCutaneous every 8 hours      LABS:	 	                              9.7    8.0   )-----------( 386      ( 28 Dec 2017 10:02 )             30.6     12-29    142  |  102  |  34.0<H>  ----------------------------<  191<H>  4.1   |  27.0  |  2.03<H>    Ca    8.4<L>      29 Dec 2017 08:10  Phos  2.3     12-29  Mg     1.4     12-29      proBNP: Serum Pro-Brain Natriuretic Peptide: 52089 pg/mL (12-26 @ 08:19)    Lipid Profile:   HgA1c:   TSH: Thyroid Stimulating Hormone, Serum: 0.85 uIU/mL (12-29 @ 08:10)

## 2017-12-29 NOTE — DISCHARGE NOTE ADULT - PLAN OF CARE
Respiratory distress resolved. medical management for CHF per family. Please continue with medication as reconciled. Fluid restriction to 1L per day. Follow up with your primary medical doctor within 1 week and also follow u with cardiology within 2 weeks.. Please continue with home medication as reconciled. Follow up with your primary medical doctor within 1 week. Please continue with home medication as reconciled. Follow up with your primary medical doctor within 1 week. Hold Norvasc for now as your BP is stable on metoprolol and Lasix has been added due to CHF. Further BP check and medication adjustment per PMD.

## 2017-12-29 NOTE — DISCHARGE NOTE ADULT - MEDICATION SUMMARY - MEDICATIONS TO TAKE
I will START or STAY ON the medications listed below when I get home from the hospital:    aspirin 325 mg oral tablet  -- 1 tab(s) by mouth once a day  -- Indication: For Heart    tamsulosin 0.4 mg oral capsule  -- 1 cap(s) by mouth once a day (at bedtime)  -- Indication: For BPH    insulin glargine  -- 10 unit(s) subcutaneous once a day (at bedtime)  -- Indication: For DM    atorvastatin 40 mg oral tablet  -- 1 tab(s) by mouth once a day (at bedtime)  -- Indication: For HLD    metoprolol tartrate 50 mg oral tablet  -- 1 tab(s) by mouth 2 times a day  -- Indication: For HTN    Bactroban 2% topical cream  -- Apply on skin to affected area 2 times a day   -- For external use only.    -- Indication: For home meds    nystatin-triamcinolone 100,000 units/g-0.1% topical cream  -- 1 application on skin 2 times a day  -- Indication: For home meds    furosemide 40 mg oral tablet  -- 1 tab(s) by mouth once a day  -- Indication: For CHF    lactobacillus acidophilus oral capsule  -- 1 cap(s) by mouth once a day  -- Indication: For home meds    pantoprazole 40 mg oral delayed release tablet  -- 1 tab(s) by mouth once a day (before a meal)  -- Indication: For GERD

## 2017-12-29 NOTE — PHYSICAL THERAPY INITIAL EVALUATION ADULT - IMPAIRMENTS FOUND, PT EVAL
muscle strength/aerobic capacity/endurance/arousal, attention, and cognition/gait, locomotion, and balance

## 2017-12-29 NOTE — PHYSICAL THERAPY INITIAL EVALUATION ADULT - PERTINENT HX OF CURRENT PROBLEM, REHAB EVAL
83 y M hx DM2 on insulin, HTN, CKD, PAD s/p L TMA, s/p ppm,  transferred from [facility] for dyspnea, hypothermia. 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 He was last hospitalized in Nov for C diff colitis and ESBL E coli UTI. He is nonambulatory and requires assistance w ADLs.

## 2017-12-29 NOTE — DISCHARGE NOTE ADULT - SECONDARY DIAGNOSIS.
Type 2 diabetes mellitus with complication, with long-term current use of insulin Essential hypertension

## 2017-12-29 NOTE — PROGRESS NOTE ADULT - SUBJECTIVE AND OBJECTIVE BOX
[facility]  INFECTIOUS DISEASES AND INTERNAL MEDICINE at [place]  =======================================================  Selvin Merlos MD  Diplomates American Board of Internal Medicine and Infectious Diseases  =======================================================    KARMA JUAN MIGUEL [id number]  seen with      Follow up: Pneumonia    No fevers  remains confused    Allergies:  No Known Allergies      Antibiotics:    ertapenem  IVPB 500 milliGRAM(s) IV Intermittent every 24 hours       REVIEW OF SYSTEMS:  Unable to obtain, patient is not a reliable historian      Physical Exam:  Vital Signs Last 24 Hrs  T(C): 36.7 (29 Dec 2017 11:51), Max: 36.8 (28 Dec 2017 20:52)  T(F): 98 (29 Dec 2017 11:51), Max: 98.2 (28 Dec 2017 20:52)  HR: 63 (29 Dec 2017 10:40) (60 - 68)  BP: 123/58 (29 Dec 2017 10:40) (123/58 - 140/63)  BP(mean): 91 (29 Dec 2017 05:38) (75 - 91)  RR: 16 (29 Dec 2017 10:40) (15 - 18)  SpO2: 94% (29 Dec 2017 10:40) (93% - 99%)      GEN: NAD, pleasant  HEENT: normocephalic and atraumatic. EOMI. PERRL.    NECK: Supple.  LUNGS: Clear to auscultation.  HEART: Regular rate and rhythm   ABDOMEN: Soft, nontender, and nondistended.  Positive bowel sounds.    : No CVA tenderness  EXTREMITIES: Lt TMA site clean, no sign of infection  MSK: No joint swelling  NEUROLOGIC: Confused, alert and oriented x Person  PSYCHIATRIC: Confused  SKIN: No rash      Labs:  12-29    142  |  102  |  34.0<H>  ----------------------------<  191<H>  4.1   |  27.0  |  2.03<H>    Ca    8.4<L>      29 Dec 2017 08:10  Phos  2.3     12-29  Mg     1.4     12-29               9.7    8.0   )-----------( 386      ( 28 Dec 2017 10:02 )             30.6     ABG - ( 27 Dec 2017 14:43 )  pH: 7.36  /  pCO2: 46    /  pO2: 39    / HCO3: 24    / Base Excess: 0.6   /  SaO2: 71            RECENT CULTURES:  12-27 @ 20:21 .Urine Clean Catch (Midstream)     No growth      12-26 @ 09:33 .Urine Catheterized Escherichia coli ESBL  Proteus mirabilis    >100,000 CFU/ml Escherichia coli ESBL  ( KNOWN )  10,000 - 49,000 CFU/mL Proteus mirabilis      12-26 @ 07:58 .Blood Blood     No growth at 48 hours      12-26 @ 07:51 .Blood Blood     No growth at 48 hours

## 2017-12-29 NOTE — PROGRESS NOTE ADULT - ASSESSMENT
83M hx DM2 on insulin, HTN, CKD, PAD s/p L TMA, s/p PPM  transferred from [facility] for dyspnea, hypothermia. CT chest showed mod pleural effusions, w possible LLL infiltrate.   Also noted to have UTI, BNP 30K.  Echo 12/217 showed  Left ventricular ejection fraction 35 to 40%.   Pt's breathing already improved Denied dizziness, chest pan, pressure, palpitation, nausea, vomiting, hematuria melena syncope, near syncope. He is sitting in bed and having food. Family is present.   patient is DNR/DNI per his daughter at bedside.  i d/w daughter/patient to have cardiac catheterization, stress test, ICD. Daughter stated that the pt is unwilling to proceed. He doesn't want invasive procedures. He wants only medical management  Alternatives indulging no treatment option, benefits and risks including but not limited to death, arrhythmia discussed.      1) Acute systolic congestive Heart failure with EF 35-40%, Pleural effusion. Improved breathing. Feels better. Cit diuretic.   2) CMP, Newly diagnosed: Refused LHC, STX. furosemide  40mg IV Push daily. metoprolol  tartrate 50mg twice daily. ASA, Statin.  3) Pacemaker: telemetry EKG.   4) Acute respiratory failure with pleural effusion and complex PNA: BIPAP, supplement O2, IV abx  5) Diabetes: A1c , follow Serum Glu  6) CRI (chronic renal insufficiency), stage 3 (moderate): Monitor SCr, lytes  7) UTI: On IV abx, c/w it. F/u cx  8) HTN:  Stable on meds .  Monitor BP.  9) PAD s/p left TMA:  c/w Local wound care

## 2017-12-29 NOTE — DISCHARGE NOTE ADULT - CARE PROVIDERS DIRECT ADDRESSES
,[email or web address]_Unknown,[email or web address]

## 2017-12-29 NOTE — PHYSICAL THERAPY INITIAL EVALUATION ADULT - CRITERIA FOR SKILLED THERAPEUTIC INTERVENTIONS
anticipated discharge recommendation/impairments found/risk reduction/prevention/therapy frequency/functional limitations in following categories/rehab potential/predicted duration of therapy intervention

## 2017-12-29 NOTE — PHYSICAL THERAPY INITIAL EVALUATION ADULT - TRANSFER SAFETY CONCERNS NOTED: SIT/STAND, REHAB EVAL
decreased step length/stepping too close to front of assistive device/decreased weight-shifting ability/inability to maintain weight-bearing restrictions w/o assist

## 2017-12-29 NOTE — PHYSICAL THERAPY INITIAL EVALUATION ADULT - ADDITIONAL COMMENTS
patient came from subacute rehab, prior to reahab he was living in [place] with his daughter as per son from past 3 months he has been in and out of hospital and has not ambulated much. he has daughter and son in [place] who take care of him as per the son.

## 2017-12-29 NOTE — DISCHARGE NOTE ADULT - ADDITIONAL INSTRUCTIONS
Please follow up with your PMD and Cardiology.  Take your home medication which you were taking prior coming to hospital. Hold Norvasc for now. Medication as reconciled.

## 2017-12-29 NOTE — DISCHARGE NOTE ADULT - CARE PLAN
Principal Discharge DX:	Acute systolic congestive heart failure  Goal:	Respiratory distress resolved. medical management for CHF per family.  Instructions for follow-up, activity and diet:	Please continue with medication as reconciled. Fluid restriction to 1L per day. Follow up with your primary medical doctor within 1 week and also follow u with cardiology within 2 weeks..  Secondary Diagnosis:	Type 2 diabetes mellitus with complication, with long-term current use of insulin  Instructions for follow-up, activity and diet:	Please continue with home medication as reconciled. Follow up with your primary medical doctor within 1 week.  Secondary Diagnosis:	Essential hypertension  Instructions for follow-up, activity and diet:	Please continue with home medication as reconciled. Follow up with your primary medical doctor within 1 week. Hold Norvasc for now as your BP is stable on metoprolol and Lasix has been added due to CHF. Further BP check and medication adjustment per PMD.

## 2018-10-10 NOTE — PROGRESS NOTE ADULT - PROBLEM SELECTOR PLAN 3
ECHO pending. No acute distress on room air.
normotensive  c/w amlodipine and metoprolol
normotensive  c/w amlodipine and metoprolol
oral

## 2019-03-04 PROBLEM — I73.9 PERIPHERAL VASCULAR DISEASE, UNSPECIFIED: Chronic | Status: ACTIVE | Noted: 2017-11-03

## 2019-03-04 PROBLEM — N18.3 CHRONIC KIDNEY DISEASE, STAGE 3 (MODERATE): Chronic | Status: ACTIVE | Noted: 2017-11-03

## 2019-03-04 PROBLEM — Z95.0 PRESENCE OF CARDIAC PACEMAKER: Chronic | Status: ACTIVE | Noted: 2017-11-03

## 2019-03-04 PROBLEM — E11.9 TYPE 2 DIABETES MELLITUS WITHOUT COMPLICATIONS: Chronic | Status: ACTIVE | Noted: 2017-10-23

## 2019-03-11 ENCOUNTER — APPOINTMENT (OUTPATIENT)
Dept: VASCULAR SURGERY | Facility: CLINIC | Age: 84
End: 2019-03-11
Payer: MEDICAID

## 2019-03-11 VITALS
SYSTOLIC BLOOD PRESSURE: 154 MMHG | DIASTOLIC BLOOD PRESSURE: 72 MMHG | HEART RATE: 60 BPM | TEMPERATURE: 97.4 F | RESPIRATION RATE: 14 BRPM | OXYGEN SATURATION: 100 %

## 2019-03-11 DIAGNOSIS — L97.509 NON-PRESSURE CHRONIC ULCER OF OTHER PART OF UNSPECIFIED FOOT WITH UNSPECIFIED SEVERITY: ICD-10-CM

## 2019-03-11 DIAGNOSIS — Z86.79 PERSONAL HISTORY OF OTHER DISEASES OF THE CIRCULATORY SYSTEM: ICD-10-CM

## 2019-03-11 DIAGNOSIS — I73.9 PERIPHERAL VASCULAR DISEASE, UNSPECIFIED: ICD-10-CM

## 2019-03-11 DIAGNOSIS — Z78.9 OTHER SPECIFIED HEALTH STATUS: ICD-10-CM

## 2019-03-11 DIAGNOSIS — Z86.39 PERSONAL HISTORY OF OTHER ENDOCRINE, NUTRITIONAL AND METABOLIC DISEASE: ICD-10-CM

## 2019-03-11 DIAGNOSIS — Z95.0 PRESENCE OF CARDIAC PACEMAKER: ICD-10-CM

## 2019-03-11 DIAGNOSIS — Z87.448 PERSONAL HISTORY OF OTHER DISEASES OF URINARY SYSTEM: ICD-10-CM

## 2019-03-11 PROCEDURE — 99243 OFF/OP CNSLTJ NEW/EST LOW 30: CPT

## 2019-03-11 PROCEDURE — 93926 LOWER EXTREMITY STUDY: CPT

## 2019-03-11 PROCEDURE — 93923 UPR/LXTR ART STDY 3+ LVLS: CPT

## 2019-03-15 ENCOUNTER — OUTPATIENT (OUTPATIENT)
Dept: OUTPATIENT SERVICES | Facility: HOSPITAL | Age: 84
LOS: 1 days | End: 2019-03-15
Payer: MEDICAID

## 2019-03-15 VITALS — WEIGHT: 149.91 LBS | HEIGHT: 66.93 IN

## 2019-03-15 VITALS
HEIGHT: 66.93 IN | HEART RATE: 62 BPM | TEMPERATURE: 99 F | DIASTOLIC BLOOD PRESSURE: 71 MMHG | WEIGHT: 149.91 LBS | RESPIRATION RATE: 18 BRPM | SYSTOLIC BLOOD PRESSURE: 140 MMHG | OXYGEN SATURATION: 97 %

## 2019-03-15 DIAGNOSIS — Z98.890 OTHER SPECIFIED POSTPROCEDURAL STATES: Chronic | ICD-10-CM

## 2019-03-15 DIAGNOSIS — I26.99 OTHER PULMONARY EMBOLISM WITHOUT ACUTE COR PULMONALE: ICD-10-CM

## 2019-03-15 DIAGNOSIS — I73.9 PERIPHERAL VASCULAR DISEASE, UNSPECIFIED: ICD-10-CM

## 2019-03-15 DIAGNOSIS — S98.139A COMPLETE TRAUMATIC AMPUTATION OF ONE UNSPECIFIED LESSER TOE, INITIAL ENCOUNTER: Chronic | ICD-10-CM

## 2019-03-15 DIAGNOSIS — Z95.0 PRESENCE OF CARDIAC PACEMAKER: Chronic | ICD-10-CM

## 2019-03-15 DIAGNOSIS — I70.301 UNSPECIFIED ATHEROSCLEROSIS OF UNSPECIFIED TYPE OF BYPASS GRAFT(S) OF THE EXTREMITIES, RIGHT LEG: Chronic | ICD-10-CM

## 2019-03-15 DIAGNOSIS — Z89.429 ACQUIRED ABSENCE OF OTHER TOE(S), UNSPECIFIED SIDE: Chronic | ICD-10-CM

## 2019-03-15 DIAGNOSIS — Z01.810 ENCOUNTER FOR PREPROCEDURAL CARDIOVASCULAR EXAMINATION: ICD-10-CM

## 2019-03-15 PROBLEM — Z86.39 HISTORY OF HIGH CHOLESTEROL: Status: RESOLVED | Noted: 2019-03-11 | Resolved: 2019-03-15

## 2019-03-15 PROBLEM — Z87.448 HISTORY OF KIDNEY DISEASE: Status: RESOLVED | Noted: 2019-03-11 | Resolved: 2019-03-15

## 2019-03-15 PROBLEM — Z86.79 HISTORY OF CARDIAC DISORDER: Status: RESOLVED | Noted: 2019-03-11 | Resolved: 2019-03-15

## 2019-03-15 PROBLEM — Z78.9 NON-SMOKER: Status: ACTIVE | Noted: 2019-03-11

## 2019-03-15 PROBLEM — Z86.79 HISTORY OF VASCULAR DISORDER: Status: RESOLVED | Noted: 2019-03-11 | Resolved: 2019-03-15

## 2019-03-15 PROBLEM — Z86.39 HISTORY OF DIABETES MELLITUS: Status: RESOLVED | Noted: 2019-03-11 | Resolved: 2019-03-15

## 2019-03-15 PROBLEM — Z86.79 HISTORY OF HYPERTENSION: Status: RESOLVED | Noted: 2019-03-11 | Resolved: 2019-03-15

## 2019-03-15 LAB
ANION GAP SERPL CALC-SCNC: 15 MMOL/L — SIGNIFICANT CHANGE UP (ref 5–17)
APTT BLD: 28.4 SEC — SIGNIFICANT CHANGE UP (ref 27.5–36.3)
BASOPHILS # BLD AUTO: 0 K/UL — SIGNIFICANT CHANGE UP (ref 0–0.2)
BASOPHILS NFR BLD AUTO: 0.3 % — SIGNIFICANT CHANGE UP (ref 0–2)
BUN SERPL-MCNC: 68 MG/DL — HIGH (ref 8–20)
CALCIUM SERPL-MCNC: 8.6 MG/DL — SIGNIFICANT CHANGE UP (ref 8.6–10.2)
CHLORIDE SERPL-SCNC: 107 MMOL/L — SIGNIFICANT CHANGE UP (ref 98–107)
CO2 SERPL-SCNC: 18 MMOL/L — LOW (ref 22–29)
CREAT SERPL-MCNC: 3.5 MG/DL — HIGH (ref 0.5–1.3)
EOSINOPHIL # BLD AUTO: 0.4 K/UL — SIGNIFICANT CHANGE UP (ref 0–0.5)
EOSINOPHIL NFR BLD AUTO: 5.1 % — HIGH (ref 0–5)
GLUCOSE SERPL-MCNC: 96 MG/DL — SIGNIFICANT CHANGE UP (ref 70–115)
HCT VFR BLD CALC: 31.7 % — LOW (ref 42–52)
HGB BLD-MCNC: 10 G/DL — LOW (ref 14–18)
INR BLD: 1.28 RATIO — HIGH (ref 0.88–1.16)
LYMPHOCYTES # BLD AUTO: 1.5 K/UL — SIGNIFICANT CHANGE UP (ref 1–4.8)
LYMPHOCYTES # BLD AUTO: 21.1 % — SIGNIFICANT CHANGE UP (ref 20–55)
MAGNESIUM SERPL-MCNC: 1.8 MG/DL — SIGNIFICANT CHANGE UP (ref 1.6–2.6)
MCHC RBC-ENTMCNC: 29.5 PG — SIGNIFICANT CHANGE UP (ref 27–31)
MCHC RBC-ENTMCNC: 31.5 G/DL — LOW (ref 32–36)
MCV RBC AUTO: 93.5 FL — SIGNIFICANT CHANGE UP (ref 80–94)
MONOCYTES # BLD AUTO: 0.9 K/UL — HIGH (ref 0–0.8)
MONOCYTES NFR BLD AUTO: 12.1 % — HIGH (ref 3–10)
NEUTROPHILS # BLD AUTO: 4.4 K/UL — SIGNIFICANT CHANGE UP (ref 1.8–8)
NEUTROPHILS NFR BLD AUTO: 61.3 % — SIGNIFICANT CHANGE UP (ref 37–73)
PLATELET # BLD AUTO: 248 K/UL — SIGNIFICANT CHANGE UP (ref 150–400)
POTASSIUM SERPL-MCNC: 4.8 MMOL/L — SIGNIFICANT CHANGE UP (ref 3.5–5.3)
POTASSIUM SERPL-SCNC: 4.8 MMOL/L — SIGNIFICANT CHANGE UP (ref 3.5–5.3)
PROTHROM AB SERPL-ACNC: 14.8 SEC — HIGH (ref 10–12.9)
RBC # BLD: 3.39 M/UL — LOW (ref 4.6–6.2)
RBC # FLD: 15.1 % — SIGNIFICANT CHANGE UP (ref 11–15.6)
SODIUM SERPL-SCNC: 140 MMOL/L — SIGNIFICANT CHANGE UP (ref 135–145)
WBC # BLD: 7.1 K/UL — SIGNIFICANT CHANGE UP (ref 4.8–10.8)
WBC # FLD AUTO: 7.1 K/UL — SIGNIFICANT CHANGE UP (ref 4.8–10.8)

## 2019-03-15 PROCEDURE — 36415 COLL VENOUS BLD VENIPUNCTURE: CPT

## 2019-03-15 PROCEDURE — 80048 BASIC METABOLIC PNL TOTAL CA: CPT

## 2019-03-15 PROCEDURE — 85730 THROMBOPLASTIN TIME PARTIAL: CPT

## 2019-03-15 PROCEDURE — 83735 ASSAY OF MAGNESIUM: CPT

## 2019-03-15 PROCEDURE — 85027 COMPLETE CBC AUTOMATED: CPT

## 2019-03-15 PROCEDURE — 85610 PROTHROMBIN TIME: CPT

## 2019-03-15 PROCEDURE — 93005 ELECTROCARDIOGRAM TRACING: CPT

## 2019-03-15 PROCEDURE — G0463: CPT

## 2019-03-15 PROCEDURE — 93010 ELECTROCARDIOGRAM REPORT: CPT

## 2019-03-15 RX ORDER — ATORVASTATIN CALCIUM 40 MG/1
40 TABLET, FILM COATED ORAL
Refills: 0 | Status: ACTIVE | COMMUNITY

## 2019-03-15 RX ORDER — TAMSULOSIN HYDROCHLORIDE 0.4 MG/1
0.4 CAPSULE ORAL
Refills: 0 | Status: ACTIVE | COMMUNITY

## 2019-03-15 RX ORDER — ACETAMINOPHEN 160 MG
TABLET,DISINTEGRATING ORAL
Refills: 0 | Status: ACTIVE | COMMUNITY

## 2019-03-15 RX ORDER — ACETAMINOPHEN 500 MG
2 TABLET ORAL
Qty: 0 | Refills: 0 | COMMUNITY

## 2019-03-15 RX ORDER — ACETAMINOPHEN 325 MG/1
325 TABLET ORAL
Refills: 0 | Status: ACTIVE | COMMUNITY

## 2019-03-15 RX ORDER — METOPROLOL TARTRATE 25 MG/1
25 TABLET, FILM COATED ORAL
Refills: 0 | Status: ACTIVE | COMMUNITY

## 2019-03-15 RX ORDER — ASPIRIN 81 MG
81 TABLET, DELAYED RELEASE (ENTERIC COATED) ORAL
Refills: 0 | Status: ACTIVE | COMMUNITY

## 2019-03-15 RX ORDER — LOSARTAN POTASSIUM AND HYDROCHLOROTHIAZIDE 25; 100 MG/1; MG/1
100-25 TABLET ORAL
Refills: 0 | Status: ACTIVE | COMMUNITY

## 2019-03-15 NOTE — H&P PST ADULT - VASCULAR DETAILS
unstageable ulcer to left heel (black eschar; 2 separate areas; 3x3cm ;medial and 1x1cm; inner) bilateral doppler pulses to lower extremities

## 2019-03-15 NOTE — H&P PST ADULT - RS GEN PE MLT RESP DETAILS PC
normal/breath sounds equal/airway patent/respirations non-labored/clear to auscultation bilaterally/good air movement/no chest wall tenderness

## 2019-03-15 NOTE — H&P PST ADULT - NSICDXPASTSURGICALHX_GEN_ALL_CORE_FT
PAST SURGICAL HISTORY:  Cardiac pacemaker to right anterior chest wall    History of amputation of toe all toes on left foot have been amputated    History of peripheral artery bypass with vein graft and TMA in Aug 2017

## 2019-03-15 NOTE — H&P PST ADULT - NSICDXPASTMEDICALHX_GEN_ALL_CORE_FT
PAST MEDICAL HISTORY:  Chronic kidney disease     Coronary heart disease     CRI (chronic renal insufficiency), stage 3 (moderate)     Diabetes     History of amputation of toe every toe on left foot has been amputated    HTN (hypertension)     Pacemaker     Poor historian     PVD (peripheral vascular disease)     PVD (peripheral vascular disease)     Ulcer of foot left heel

## 2019-03-15 NOTE — H&P PST ADULT - NSICDXPROBLEM_GEN_ALL_CORE_FT
PROBLEM DIAGNOSES  Problem: PVD (peripheral vascular disease)  Assessment and Plan: -LLE angio with Dr. Bingham  -LAbs and EKG reviewed  -IVF pre procedure  -discussed procedure with pt via ; risks and benefits explained; questions answered

## 2019-03-15 NOTE — ASU PATIENT PROFILE, ADULT - VISION (WITH CORRECTIVE LENSES IF THE PATIENT USUALLY WEARS THEM):
Partially impaired: cannot see medication labels or newsprint, but can see obstacles in path, and the surrounding layout; can count fingers at arm's length staff manages meds/Partially impaired: cannot see medication labels or newsprint, but can see obstacles in path, and the surrounding layout; can count fingers at arm's length

## 2019-03-15 NOTE — HISTORY OF PRESENT ILLNESS
[FreeTextEntry1] : 85 year-old male comes in from [facility] for a LLE foot unstageable pressure ulcer that was noticed first on November 2018. Gradually becoming worse. Painful well controlled with Tylenol. Patient has a PSH of RLE bypass with vein graft and TMA from August 2017 at OSH. He is nonambulatory. He is on ASA, statin. Nonsmoker. Hx positive for DM.\par \par History taken from patient's daughter Camilla Nair [phone].

## 2019-03-15 NOTE — H&P PST ADULT - HISTORY OF PRESENT ILLNESS
This is an 86 y/o , wheelchair bound M (poor historian) with PMHx of HTN ,HLD, CKD, DM, PPM, PVD with unstageable (black eschar; 2 separate areas; 3x3cm ;medial and 1x1cm; inner; current tx w betadine paint) left heel ulcer presents today to PST for LLE angio with Dr. Bingham.    Ultrasound shows patent bypass but with poorly visualized distal anastomosis  AWILDA 0.46 with severely blunted PVR waveform    Pt currently denies chest pain, SOB, palps, dyspnea; endorses LLE claudication and pain.    ASA: 3  Mallampati: 2

## 2019-03-15 NOTE — REVIEW OF SYSTEMS
[As Noted in HPI] : as noted in HPI [Skin Lesions] : skin lesion [Negative] : Heme/Lymph [de-identified] : Unstageable ulcer in R foot, TMA site clean, dry, intact

## 2019-03-15 NOTE — H&P PST ADULT - ASSESSMENT
This is an 84 y/o , wheelchair bound M (poor historian) with PMHx of HTN ,HLD, CKD, DM, PPM, PVD with unstageable (black eschar; 2 separate areas; 3x3cm ;medial and 1x1cm; inner; current tx w betadine paint) left heel ulcer presents today to PST for LLE angio with Dr. Bingham.

## 2019-03-15 NOTE — PHYSICAL EXAM
[Normal Breath Sounds] : Normal breath sounds [Normal Heart Sounds] : normal heart sounds [Normal Rate and Rhythm] : normal rate and rhythm [2+] : left 2+ [0] : left 0 [No HSM] : no hepatosplenomegaly [Skin Ulcer] : ulcer [Alert] : alert [Oriented to Person] : oriented to person [Oriented to Place] : oriented to place [Calm] : calm [JVD] : no jugular venous distention  [Carotid Bruits] : no carotid bruits [Right Carotid Bruit] : no bruit heard over the right carotid [Left Carotid Bruit] : no bruit heard over the left carotid [Right Femoral Bruit] : no bruit heard over the right femoral artery [Left Femoral Bruit] : no bruit heard over the left femoral artery [Ankle Swelling (On Exam)] : not present [Varicose Veins Of Lower Extremities] : not present [] : not present [Abdomen Masses] : No abdominal masses [Abdomen Tenderness] : ~T ~M No abdominal tenderness [Abdominal Bruit] : no abdominal bruit  [Tender] : was nontender [Enlarged] : not enlarged [Oriented to Time] : disoriented to time [de-identified] : Alert, well nourished [de-identified] : ROBBIE REDDY [de-identified] : Supple [de-identified] : CTAB [FreeTextEntry1] : Dopplerable signals on b/l DP/PT [de-identified] : deferred [de-identified] : deferred [de-identified] : LLE TMA site clean, dry and intact, no other deformities, wheelchair bound [de-identified] : LLE pressure ulcer, unstageable, medial heel 3cm with black eschar base. Tender to palpation [de-identified] : Lucid, intermittently oriented

## 2019-03-16 PROBLEM — Z89.429 ACQUIRED ABSENCE OF OTHER TOE(S), UNSPECIFIED SIDE: Chronic | Status: ACTIVE | Noted: 2019-03-15

## 2019-03-16 PROBLEM — L97.509 NON-PRESSURE CHRONIC ULCER OF OTHER PART OF UNSPECIFIED FOOT WITH UNSPECIFIED SEVERITY: Chronic | Status: ACTIVE | Noted: 2019-03-15

## 2019-03-19 ENCOUNTER — TRANSCRIPTION ENCOUNTER (OUTPATIENT)
Age: 84
End: 2019-03-19

## 2019-03-22 PROBLEM — Z78.9 OTHER SPECIFIED HEALTH STATUS: Chronic | Status: ACTIVE | Noted: 2019-03-15

## 2019-03-22 PROBLEM — N18.9 CHRONIC KIDNEY DISEASE, UNSPECIFIED: Chronic | Status: ACTIVE | Noted: 2019-03-15

## 2019-03-22 PROBLEM — I73.9 PERIPHERAL VASCULAR DISEASE, UNSPECIFIED: Chronic | Status: ACTIVE | Noted: 2019-03-15

## 2019-03-22 PROBLEM — I25.10 ATHEROSCLEROTIC HEART DISEASE OF NATIVE CORONARY ARTERY WITHOUT ANGINA PECTORIS: Chronic | Status: ACTIVE | Noted: 2019-03-15

## 2019-03-29 ENCOUNTER — INPATIENT (INPATIENT)
Facility: HOSPITAL | Age: 84
LOS: 6 days | Discharge: HOME | DRG: 871 | End: 2019-04-05
Attending: HOSPITALIST | Admitting: INTERNAL MEDICINE
Payer: MEDICAID

## 2019-03-29 VITALS
DIASTOLIC BLOOD PRESSURE: 83 MMHG | SYSTOLIC BLOOD PRESSURE: 130 MMHG | OXYGEN SATURATION: 100 % | RESPIRATION RATE: 20 BRPM | TEMPERATURE: 99 F | HEART RATE: 98 BPM

## 2019-03-29 DIAGNOSIS — Z95.0 PRESENCE OF CARDIAC PACEMAKER: Chronic | ICD-10-CM

## 2019-03-29 DIAGNOSIS — Z98.890 OTHER SPECIFIED POSTPROCEDURAL STATES: Chronic | ICD-10-CM

## 2019-03-29 DIAGNOSIS — Z89.429 ACQUIRED ABSENCE OF OTHER TOE(S), UNSPECIFIED SIDE: Chronic | ICD-10-CM

## 2019-03-29 DIAGNOSIS — J18.9 PNEUMONIA, UNSPECIFIED ORGANISM: ICD-10-CM

## 2019-03-29 LAB
ALBUMIN SERPL ELPH-MCNC: 3.4 G/DL — SIGNIFICANT CHANGE UP (ref 3.3–5.2)
ALP SERPL-CCNC: 210 U/L — HIGH (ref 40–120)
ALT FLD-CCNC: 19 U/L — SIGNIFICANT CHANGE UP
ANION GAP SERPL CALC-SCNC: 14 MMOL/L — SIGNIFICANT CHANGE UP (ref 5–17)
APTT BLD: 24.7 SEC — LOW (ref 27.5–36.3)
AST SERPL-CCNC: 22 U/L — SIGNIFICANT CHANGE UP
BASOPHILS # BLD AUTO: 0 K/UL — SIGNIFICANT CHANGE UP (ref 0–0.2)
BASOPHILS NFR BLD AUTO: 0.2 % — SIGNIFICANT CHANGE UP (ref 0–2)
BILIRUB SERPL-MCNC: 0.4 MG/DL — SIGNIFICANT CHANGE UP (ref 0.4–2)
BUN SERPL-MCNC: 59 MG/DL — HIGH (ref 8–20)
CALCIUM SERPL-MCNC: 8.7 MG/DL — SIGNIFICANT CHANGE UP (ref 8.6–10.2)
CHLORIDE SERPL-SCNC: 106 MMOL/L — SIGNIFICANT CHANGE UP (ref 98–107)
CO2 SERPL-SCNC: 19 MMOL/L — LOW (ref 22–29)
CREAT SERPL-MCNC: 3.13 MG/DL — HIGH (ref 0.5–1.3)
EOSINOPHIL # BLD AUTO: 0.1 K/UL — SIGNIFICANT CHANGE UP (ref 0–0.5)
EOSINOPHIL NFR BLD AUTO: 0.6 % — SIGNIFICANT CHANGE UP (ref 0–5)
GAS PNL BLDA: SIGNIFICANT CHANGE UP
GLUCOSE SERPL-MCNC: 124 MG/DL — HIGH (ref 70–115)
HCT VFR BLD CALC: 31.2 % — LOW (ref 42–52)
HGB BLD-MCNC: 9.5 G/DL — LOW (ref 14–18)
LYMPHOCYTES # BLD AUTO: 0.7 K/UL — LOW (ref 1–4.8)
LYMPHOCYTES # BLD AUTO: 6.5 % — LOW (ref 20–55)
MCHC RBC-ENTMCNC: 28.5 PG — SIGNIFICANT CHANGE UP (ref 27–31)
MCHC RBC-ENTMCNC: 30.4 G/DL — LOW (ref 32–36)
MCV RBC AUTO: 93.7 FL — SIGNIFICANT CHANGE UP (ref 80–94)
MONOCYTES # BLD AUTO: 1 K/UL — HIGH (ref 0–0.8)
MONOCYTES NFR BLD AUTO: 8.9 % — SIGNIFICANT CHANGE UP (ref 3–10)
NEUTROPHILS # BLD AUTO: 9.4 K/UL — HIGH (ref 1.8–8)
NEUTROPHILS NFR BLD AUTO: 83.5 % — HIGH (ref 37–73)
PLATELET # BLD AUTO: 226 K/UL — SIGNIFICANT CHANGE UP (ref 150–400)
POTASSIUM SERPL-MCNC: 4.9 MMOL/L — SIGNIFICANT CHANGE UP (ref 3.5–5.3)
POTASSIUM SERPL-SCNC: 4.9 MMOL/L — SIGNIFICANT CHANGE UP (ref 3.5–5.3)
PROT SERPL-MCNC: 7.2 G/DL — SIGNIFICANT CHANGE UP (ref 6.6–8.7)
RBC # BLD: 3.33 M/UL — LOW (ref 4.6–6.2)
RBC # FLD: 15.8 % — HIGH (ref 11–15.6)
SODIUM SERPL-SCNC: 139 MMOL/L — SIGNIFICANT CHANGE UP (ref 135–145)
WBC # BLD: 11.3 K/UL — HIGH (ref 4.8–10.8)
WBC # FLD AUTO: 11.3 K/UL — HIGH (ref 4.8–10.8)

## 2019-03-29 PROCEDURE — 99223 1ST HOSP IP/OBS HIGH 75: CPT

## 2019-03-29 PROCEDURE — 99284 EMERGENCY DEPT VISIT MOD MDM: CPT

## 2019-03-29 PROCEDURE — 71045 X-RAY EXAM CHEST 1 VIEW: CPT | Mod: 26

## 2019-03-29 PROCEDURE — 93010 ELECTROCARDIOGRAM REPORT: CPT

## 2019-03-29 RX ORDER — SACCHAROMYCES BOULARDII 250 MG
250 POWDER IN PACKET (EA) ORAL
Qty: 0 | Refills: 0 | Status: DISCONTINUED | OUTPATIENT
Start: 2019-03-29 | End: 2019-04-04

## 2019-03-29 RX ORDER — ONDANSETRON 8 MG/1
4 TABLET, FILM COATED ORAL EVERY 6 HOURS
Qty: 0 | Refills: 0 | Status: DISCONTINUED | OUTPATIENT
Start: 2019-03-29 | End: 2019-04-04

## 2019-03-29 RX ORDER — ACETAMINOPHEN 500 MG
650 TABLET ORAL EVERY 6 HOURS
Qty: 0 | Refills: 0 | Status: DISCONTINUED | OUTPATIENT
Start: 2019-03-29 | End: 2019-04-05

## 2019-03-29 RX ORDER — HEPARIN SODIUM 5000 [USP'U]/ML
5000 INJECTION INTRAVENOUS; SUBCUTANEOUS EVERY 8 HOURS
Qty: 0 | Refills: 0 | Status: DISCONTINUED | OUTPATIENT
Start: 2019-03-29 | End: 2019-04-04

## 2019-03-29 RX ORDER — DEXTROSE 50 % IN WATER 50 %
25 SYRINGE (ML) INTRAVENOUS ONCE
Qty: 0 | Refills: 0 | Status: DISCONTINUED | OUTPATIENT
Start: 2019-03-29 | End: 2019-04-05

## 2019-03-29 RX ORDER — SODIUM CHLORIDE 9 MG/ML
1000 INJECTION, SOLUTION INTRAVENOUS
Qty: 0 | Refills: 0 | Status: DISCONTINUED | OUTPATIENT
Start: 2019-03-29 | End: 2019-04-05

## 2019-03-29 RX ORDER — VANCOMYCIN HCL 1 G
500 VIAL (EA) INTRAVENOUS EVERY 24 HOURS
Qty: 0 | Refills: 0 | Status: DISCONTINUED | OUTPATIENT
Start: 2019-03-29 | End: 2019-03-31

## 2019-03-29 RX ORDER — DEXTROSE 50 % IN WATER 50 %
12.5 SYRINGE (ML) INTRAVENOUS ONCE
Qty: 0 | Refills: 0 | Status: DISCONTINUED | OUTPATIENT
Start: 2019-03-29 | End: 2019-04-05

## 2019-03-29 RX ORDER — METOPROLOL TARTRATE 50 MG
25 TABLET ORAL THREE TIMES A DAY
Qty: 0 | Refills: 0 | Status: DISCONTINUED | OUTPATIENT
Start: 2019-03-29 | End: 2019-04-03

## 2019-03-29 RX ORDER — INSULIN LISPRO 100/ML
VIAL (ML) SUBCUTANEOUS
Qty: 0 | Refills: 0 | Status: DISCONTINUED | OUTPATIENT
Start: 2019-03-29 | End: 2019-04-05

## 2019-03-29 RX ORDER — AZITHROMYCIN 500 MG/1
500 TABLET, FILM COATED ORAL ONCE
Qty: 0 | Refills: 0 | Status: COMPLETED | OUTPATIENT
Start: 2019-03-29 | End: 2019-03-29

## 2019-03-29 RX ORDER — GABAPENTIN 400 MG/1
100 CAPSULE ORAL THREE TIMES A DAY
Qty: 0 | Refills: 0 | Status: DISCONTINUED | OUTPATIENT
Start: 2019-03-29 | End: 2019-04-02

## 2019-03-29 RX ORDER — VANCOMYCIN HCL 1 G
1000 VIAL (EA) INTRAVENOUS EVERY 24 HOURS
Qty: 0 | Refills: 0 | Status: DISCONTINUED | OUTPATIENT
Start: 2019-03-29 | End: 2019-03-29

## 2019-03-29 RX ORDER — PIPERACILLIN AND TAZOBACTAM 4; .5 G/20ML; G/20ML
3.38 INJECTION, POWDER, LYOPHILIZED, FOR SOLUTION INTRAVENOUS EVERY 12 HOURS
Qty: 0 | Refills: 0 | Status: DISCONTINUED | OUTPATIENT
Start: 2019-03-29 | End: 2019-03-31

## 2019-03-29 RX ORDER — SODIUM CHLORIDE 9 MG/ML
3 INJECTION INTRAMUSCULAR; INTRAVENOUS; SUBCUTANEOUS EVERY 8 HOURS
Qty: 0 | Refills: 0 | Status: DISCONTINUED | OUTPATIENT
Start: 2019-03-29 | End: 2019-04-05

## 2019-03-29 RX ORDER — CEFTRIAXONE 500 MG/1
1 INJECTION, POWDER, FOR SOLUTION INTRAMUSCULAR; INTRAVENOUS ONCE
Qty: 0 | Refills: 0 | Status: COMPLETED | OUTPATIENT
Start: 2019-03-29 | End: 2019-03-29

## 2019-03-29 RX ORDER — GABAPENTIN 400 MG/1
1 CAPSULE ORAL
Qty: 0 | Refills: 0 | COMMUNITY

## 2019-03-29 RX ORDER — GLUCAGON INJECTION, SOLUTION 0.5 MG/.1ML
1 INJECTION, SOLUTION SUBCUTANEOUS ONCE
Qty: 0 | Refills: 0 | Status: DISCONTINUED | OUTPATIENT
Start: 2019-03-29 | End: 2019-04-05

## 2019-03-29 RX ORDER — DEXTROSE 50 % IN WATER 50 %
15 SYRINGE (ML) INTRAVENOUS ONCE
Qty: 0 | Refills: 0 | Status: DISCONTINUED | OUTPATIENT
Start: 2019-03-29 | End: 2019-04-05

## 2019-03-29 RX ORDER — TAMSULOSIN HYDROCHLORIDE 0.4 MG/1
0.4 CAPSULE ORAL AT BEDTIME
Qty: 0 | Refills: 0 | Status: DISCONTINUED | OUTPATIENT
Start: 2019-03-29 | End: 2019-04-05

## 2019-03-29 RX ORDER — ACETAMINOPHEN 500 MG
650 TABLET ORAL ONCE
Qty: 0 | Refills: 0 | Status: COMPLETED | OUTPATIENT
Start: 2019-03-29 | End: 2019-03-29

## 2019-03-29 RX ORDER — ATORVASTATIN CALCIUM 80 MG/1
40 TABLET, FILM COATED ORAL AT BEDTIME
Qty: 0 | Refills: 0 | Status: DISCONTINUED | OUTPATIENT
Start: 2019-03-29 | End: 2019-04-05

## 2019-03-29 RX ORDER — ASPIRIN/CALCIUM CARB/MAGNESIUM 324 MG
81 TABLET ORAL DAILY
Qty: 0 | Refills: 0 | Status: DISCONTINUED | OUTPATIENT
Start: 2019-03-29 | End: 2019-04-05

## 2019-03-29 RX ORDER — SODIUM CHLORIDE 9 MG/ML
2200 INJECTION INTRAMUSCULAR; INTRAVENOUS; SUBCUTANEOUS ONCE
Qty: 0 | Refills: 0 | Status: COMPLETED | OUTPATIENT
Start: 2019-03-29 | End: 2019-03-29

## 2019-03-29 RX ADMIN — Medication 650 MILLIGRAM(S): at 22:24

## 2019-03-29 RX ADMIN — AZITHROMYCIN 255 MILLIGRAM(S): 500 TABLET, FILM COATED ORAL at 22:32

## 2019-03-29 RX ADMIN — SODIUM CHLORIDE 2200 MILLILITER(S): 9 INJECTION INTRAMUSCULAR; INTRAVENOUS; SUBCUTANEOUS at 20:34

## 2019-03-29 RX ADMIN — CEFTRIAXONE 100 GRAM(S): 500 INJECTION, POWDER, FOR SOLUTION INTRAMUSCULAR; INTRAVENOUS at 20:34

## 2019-03-29 RX ADMIN — Medication 650 MILLIGRAM(S): at 21:14

## 2019-03-29 NOTE — ED ADULT TRIAGE NOTE - CHIEF COMPLAINT QUOTE
Pt BIBA c/o AMS.  Pt from [place].  As per EMS Family was unable to recognize his wife at nursing home.  This is new finding.  Pt currently A&Ox1.   called to critical care to evaluate pt.

## 2019-03-29 NOTE — H&P ADULT - ATTENDING COMMENTS
DNR/DNI    20 min spend discussing advanced directives and goals of care with family above and beyond medical management. MOLST form sent from NH and active. Will place palliative care consult.

## 2019-03-29 NOTE — ED PROVIDER NOTE - CLINICAL SUMMARY MEDICAL DECISION MAKING FREE TEXT BOX
Pt. from [facility] due to SOB/ and hypoxia. Pt. with abdominal retractions on initial evaluation in the ED. Possible pneumonia. Will check labs/cultures/IV fluids.

## 2019-03-29 NOTE — ED ADULT NURSE NOTE - CHIEF COMPLAINT QUOTE
Pt BIBA c/o AMS.  Pt from [facility] [place].  As per EMS Family was unable to recognize his wife at nursing home.  This is new finding.  Pt currently A&Ox1.   called to critical care to evaluate pt.

## 2019-03-29 NOTE — ED ADULT NURSE NOTE - PSH
Cardiac pacemaker  to right anterior chest wall  History of amputation of toe  all toes on left foot have been amputated  History of peripheral artery bypass  with vein graft and TMA in Aug 2017

## 2019-03-29 NOTE — ED ADULT NURSE NOTE - OBJECTIVE STATEMENT
pt biba from [facility] with mental status changes, per family whom goes to see pt every night for dinner, pt appeared to be having trouble breathing " he was breathing very fast and hard" " He didn't recognize me" alert and following basic commands + tachypneic with sl retractions noted. + r lower lobe diminished with rhonchi noted. oxygen sat 92-94%ra placed on 2 lnc increase of saturation 97%2l nc+ fever.

## 2019-03-29 NOTE — PHARMACY COMMUNICATION NOTE - COMMENTS
changed dosing from 1g q24 to 500mg q24 to bring down expected trough from 30 to 15 due to patients renal function

## 2019-03-29 NOTE — H&P ADULT - ASSESSMENT
85 y/o male with HCAP, Sepsis, hypoxia, severe RLE PVD with chronic non-healing heel ulcer, CAD, HTN, HLD, DM-2, ICM with EF of 35%

## 2019-03-29 NOTE — ED ADULT NURSE REASSESSMENT NOTE - NS ED NURSE REASSESS COMMENT FT1
Assumed care of patient at 2200 from ongoing RN, alert and oriented x2, disoriented to time Which Is normal as per daughter. Comfort care done, admitting MD at bedside at this time. Report given to LENA Arreaga in CDU. Pt safely transferred to CDU bed 1.

## 2019-03-29 NOTE — ED PROVIDER NOTE - PROGRESS NOTE DETAILS
Pt. with pneumonia on CXR. Pt. is more awake and alert now in the ED. Pt. answering questions and opening his eyes spontaneously. Pt. stable appearing. Pt. will be admitted for pneumonia. Case discussed with Dr. Reeder for admission.

## 2019-03-29 NOTE — H&P ADULT - VASCULAR DETAILS
no palpable DP, poorly palpable PT, distal amputation site cool, but not mottled or cyanotic  Cap refill on right foot <3 sec, no skin breakdown, some hyperemia

## 2019-03-29 NOTE — ED PROVIDER NOTE - LOCATION
foot/Amputation to left foot. Two unstageable ulcers noted to Left heel . No discharge. NO foul smell.

## 2019-03-29 NOTE — ED PROVIDER NOTE - PMH
Chronic kidney disease    Coronary heart disease    CRI (chronic renal insufficiency), stage 3 (moderate)    Diabetes    History of amputation of toe  every toe on left foot has been amputated  HTN (hypertension)    Pacemaker    Poor historian    PVD (peripheral vascular disease)    PVD (peripheral vascular disease)    Ulcer of foot  left heel

## 2019-03-29 NOTE — ED PROVIDER NOTE - OBJECTIVE STATEMENT
Pt. present to ED from [facility] for difficulty breathing and hypoxia. Pt. has hx of HTN/DM/HLD. Pt's daughter found the patient less alert and weaker than normal. Pt. was confused and not opening his eyes. Pt. was not speaking. Pt. was breathing  fast. Pt. was not responding much to external stimuly. Pt. present to ED from [place] for difficulty breathing and hypoxia. Pt. has hx of HTN/DM/HLD. Pt's daughter found the patient less alert and weaker than normal. Pt. was confused and not opening his eyes. Pt. was not speaking. Pt. was breathing  fast. Pt. was not responding much to external stimuli. Pt. denies any chest pain. NO abdominal pain. NO vomiting. NO diarrhea. Pt. febrile in the ED.

## 2019-03-29 NOTE — H&P ADULT - HISTORY OF PRESENT ILLNESS
84 y/o male sent from NH for above. 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 Patient was recently seen by Vascular for scheduled angiogram for ischemic non healing ulcer over left heel, however procedure was cancelled due to advanced CKD and risk of contrast nephropathy. Patient was at his usual baseline until over the course of the day he was noted to become more confused, coughing, and became hypoxic. He does have a hx of PNA as per family. He does not have any reported history of dysphagia on chart or by family. In the ED patient with RML infiltrate, fever, tachypnea, leukocytosis, and ruled in for sepsis. Lactate normal, MAP/SBP normal range.

## 2019-03-29 NOTE — PATIENT PROFILE ADULT - FALL HARM RISK
age(85 years old or older) bones(Osteoporosis,prev fx,steroid use,metastatic bone ca)/age(85 years old or older)

## 2019-03-29 NOTE — H&P ADULT - PROBLEM SELECTOR PLAN 7
improved slightly from prior, will DC ARB, renally dose meds, avoid nephrotoxic drugs. Check renal U/S

## 2019-03-29 NOTE — H&P ADULT - PROBLEM SELECTOR PLAN 4
Based on previous documentation and family at bedside ulcers are about the same, no drainage, foul smell, or surrounding erythema to suggest this is source of infection. Will cont. with local wound care and off loading boots. Needs to f/u with Vascular. Cont. ASA, statin.

## 2019-03-29 NOTE — H&P ADULT - PROBLEM SELECTOR PLAN 1
Based on living in LTC facility and sepsis with cover for HCAP gram neg/pos, MRSA and anaerobes pending cultures. s/p IV bolous of 30ml/kg. Trend fever curver, WBC. Lactate normal, MAP >65, cont. supplemental 02, nebs, chest pt, spiromter, OOB, DVT-P

## 2019-03-30 DIAGNOSIS — J18.9 PNEUMONIA, UNSPECIFIED ORGANISM: ICD-10-CM

## 2019-03-30 DIAGNOSIS — I73.9 PERIPHERAL VASCULAR DISEASE, UNSPECIFIED: ICD-10-CM

## 2019-03-30 DIAGNOSIS — R09.02 HYPOXEMIA: ICD-10-CM

## 2019-03-30 DIAGNOSIS — A41.9 SEPSIS, UNSPECIFIED ORGANISM: ICD-10-CM

## 2019-03-30 DIAGNOSIS — I10 ESSENTIAL (PRIMARY) HYPERTENSION: ICD-10-CM

## 2019-03-30 DIAGNOSIS — E11.51 TYPE 2 DIABETES MELLITUS WITH DIABETIC PERIPHERAL ANGIOPATHY WITHOUT GANGRENE: ICD-10-CM

## 2019-03-30 DIAGNOSIS — N18.4 CHRONIC KIDNEY DISEASE, STAGE 4 (SEVERE): ICD-10-CM

## 2019-03-30 DIAGNOSIS — L97.529 NON-PRESSURE CHRONIC ULCER OF OTHER PART OF LEFT FOOT WITH UNSPECIFIED SEVERITY: ICD-10-CM

## 2019-03-30 LAB
ANION GAP SERPL CALC-SCNC: 15 MMOL/L — SIGNIFICANT CHANGE UP (ref 5–17)
BASOPHILS # BLD AUTO: 0 K/UL — SIGNIFICANT CHANGE UP (ref 0–0.2)
BASOPHILS NFR BLD AUTO: 0.2 % — SIGNIFICANT CHANGE UP (ref 0–2)
BUN SERPL-MCNC: 54 MG/DL — HIGH (ref 8–20)
CALCIUM SERPL-MCNC: 8.3 MG/DL — LOW (ref 8.6–10.2)
CHLORIDE SERPL-SCNC: 110 MMOL/L — HIGH (ref 98–107)
CO2 SERPL-SCNC: 17 MMOL/L — LOW (ref 22–29)
CREAT SERPL-MCNC: 3.03 MG/DL — HIGH (ref 0.5–1.3)
EOSINOPHIL # BLD AUTO: 0.2 K/UL — SIGNIFICANT CHANGE UP (ref 0–0.5)
EOSINOPHIL NFR BLD AUTO: 1.8 % — SIGNIFICANT CHANGE UP (ref 0–5)
GLUCOSE BLDC GLUCOMTR-MCNC: 158 MG/DL — HIGH (ref 70–99)
GLUCOSE BLDC GLUCOMTR-MCNC: 166 MG/DL — HIGH (ref 70–99)
GLUCOSE BLDC GLUCOMTR-MCNC: 177 MG/DL — HIGH (ref 70–99)
GLUCOSE BLDC GLUCOMTR-MCNC: 217 MG/DL — HIGH (ref 70–99)
GLUCOSE SERPL-MCNC: 163 MG/DL — HIGH (ref 70–115)
HCT VFR BLD CALC: 29.9 % — LOW (ref 42–52)
HGB BLD-MCNC: 9.2 G/DL — LOW (ref 14–18)
LYMPHOCYTES # BLD AUTO: 1 K/UL — SIGNIFICANT CHANGE UP (ref 1–4.8)
LYMPHOCYTES # BLD AUTO: 11.1 % — LOW (ref 20–55)
MCHC RBC-ENTMCNC: 29 PG — SIGNIFICANT CHANGE UP (ref 27–31)
MCHC RBC-ENTMCNC: 30.8 G/DL — LOW (ref 32–36)
MCV RBC AUTO: 94.3 FL — HIGH (ref 80–94)
MONOCYTES # BLD AUTO: 1 K/UL — HIGH (ref 0–0.8)
MONOCYTES NFR BLD AUTO: 10.9 % — HIGH (ref 3–10)
NEUTROPHILS # BLD AUTO: 6.7 K/UL — SIGNIFICANT CHANGE UP (ref 1.8–8)
NEUTROPHILS NFR BLD AUTO: 75.8 % — HIGH (ref 37–73)
PLATELET # BLD AUTO: 191 K/UL — SIGNIFICANT CHANGE UP (ref 150–400)
POTASSIUM SERPL-MCNC: 4.3 MMOL/L — SIGNIFICANT CHANGE UP (ref 3.5–5.3)
POTASSIUM SERPL-SCNC: 4.3 MMOL/L — SIGNIFICANT CHANGE UP (ref 3.5–5.3)
RAPID RVP RESULT: SIGNIFICANT CHANGE UP
RBC # BLD: 3.17 M/UL — LOW (ref 4.6–6.2)
RBC # FLD: 15.8 % — HIGH (ref 11–15.6)
SODIUM SERPL-SCNC: 142 MMOL/L — SIGNIFICANT CHANGE UP (ref 135–145)
WBC # BLD: 8.9 K/UL — SIGNIFICANT CHANGE UP (ref 4.8–10.8)
WBC # FLD AUTO: 8.9 K/UL — SIGNIFICANT CHANGE UP (ref 4.8–10.8)

## 2019-03-30 PROCEDURE — 99232 SBSQ HOSP IP/OBS MODERATE 35: CPT

## 2019-03-30 PROCEDURE — 76775 US EXAM ABDO BACK WALL LIM: CPT | Mod: 26

## 2019-03-30 PROCEDURE — 99497 ADVNCD CARE PLAN 30 MIN: CPT | Mod: GC,25

## 2019-03-30 RX ORDER — HALOPERIDOL DECANOATE 100 MG/ML
1 INJECTION INTRAMUSCULAR ONCE
Qty: 0 | Refills: 0 | Status: COMPLETED | OUTPATIENT
Start: 2019-03-30 | End: 2019-03-30

## 2019-03-30 RX ORDER — SODIUM CHLORIDE 9 MG/ML
3 INJECTION INTRAMUSCULAR; INTRAVENOUS; SUBCUTANEOUS ONCE
Qty: 0 | Refills: 0 | Status: COMPLETED | OUTPATIENT
Start: 2019-03-30 | End: 2019-03-30

## 2019-03-30 RX ADMIN — Medication 81 MILLIGRAM(S): at 15:25

## 2019-03-30 RX ADMIN — Medication 100 MILLIGRAM(S): at 00:55

## 2019-03-30 RX ADMIN — GABAPENTIN 100 MILLIGRAM(S): 400 CAPSULE ORAL at 05:02

## 2019-03-30 RX ADMIN — HALOPERIDOL DECANOATE 1 MILLIGRAM(S): 100 INJECTION INTRAMUSCULAR at 23:30

## 2019-03-30 RX ADMIN — TAMSULOSIN HYDROCHLORIDE 0.4 MILLIGRAM(S): 0.4 CAPSULE ORAL at 21:31

## 2019-03-30 RX ADMIN — HEPARIN SODIUM 5000 UNIT(S): 5000 INJECTION INTRAVENOUS; SUBCUTANEOUS at 21:31

## 2019-03-30 RX ADMIN — Medication 4: at 12:06

## 2019-03-30 RX ADMIN — Medication 250 MILLIGRAM(S): at 05:03

## 2019-03-30 RX ADMIN — HEPARIN SODIUM 5000 UNIT(S): 5000 INJECTION INTRAVENOUS; SUBCUTANEOUS at 15:24

## 2019-03-30 RX ADMIN — PIPERACILLIN AND TAZOBACTAM 25 GRAM(S): 4; .5 INJECTION, POWDER, LYOPHILIZED, FOR SOLUTION INTRAVENOUS at 05:02

## 2019-03-30 RX ADMIN — Medication 25 MILLIGRAM(S): at 21:31

## 2019-03-30 RX ADMIN — Medication 25 MILLIGRAM(S): at 15:24

## 2019-03-30 RX ADMIN — Medication 250 MILLIGRAM(S): at 17:27

## 2019-03-30 RX ADMIN — PIPERACILLIN AND TAZOBACTAM 25 GRAM(S): 4; .5 INJECTION, POWDER, LYOPHILIZED, FOR SOLUTION INTRAVENOUS at 17:26

## 2019-03-30 RX ADMIN — Medication 2: at 21:33

## 2019-03-30 RX ADMIN — SODIUM CHLORIDE 3 MILLILITER(S): 9 INJECTION INTRAMUSCULAR; INTRAVENOUS; SUBCUTANEOUS at 21:33

## 2019-03-30 RX ADMIN — GABAPENTIN 100 MILLIGRAM(S): 400 CAPSULE ORAL at 21:31

## 2019-03-30 RX ADMIN — ATORVASTATIN CALCIUM 40 MILLIGRAM(S): 80 TABLET, FILM COATED ORAL at 21:31

## 2019-03-30 RX ADMIN — GABAPENTIN 100 MILLIGRAM(S): 400 CAPSULE ORAL at 15:24

## 2019-03-30 RX ADMIN — Medication 2: at 17:26

## 2019-03-30 RX ADMIN — HEPARIN SODIUM 5000 UNIT(S): 5000 INJECTION INTRAVENOUS; SUBCUTANEOUS at 05:02

## 2019-03-30 RX ADMIN — Medication 2: at 08:17

## 2019-03-30 RX ADMIN — SODIUM CHLORIDE 3 MILLILITER(S): 9 INJECTION INTRAMUSCULAR; INTRAVENOUS; SUBCUTANEOUS at 13:59

## 2019-03-30 RX ADMIN — Medication 25 MILLIGRAM(S): at 05:02

## 2019-03-30 RX ADMIN — Medication 650 MILLIGRAM(S): at 23:40

## 2019-03-30 NOTE — PROGRESS NOTE ADULT - ASSESSMENT
A/P:  87 y/o male with history of MCI/early dementia, severe RLE PVD with chronic non-healing heel ulcer, CAD, HTN, HLD, DM-2, ICM with EF of 35% presents to the hospital with sepsis from HCAP. Patient much improved this morning in terms of mental status. Continuing IV antibiotics. Cardiac monitor discontinued on 3/30. Oxygen saturation by 3/30 already improved to 97% on 0.5 L of oxygen via NC.    HCAP (healthcare-associated pneumonia); likely gram positive/gram negative pneumonia.    - Based on living in LTC facility and sepsis with cover for HCAP gram neg/pos, MRSA and anaerobes pending cultures.   - cont. supplemental 02, nebs, chest pt, spiromter, OOB, DVT-P.     Sepsis, due to pnuemonia as above;  - follow up cultures  - obtain sputum culture - induced  - obtain RVP  - s/p IV bolus of 30ml/kg. Trend fever curver, WBC.   - Lactate normal, MAP >65    Acute Hypoxic respiratory failure; due to PNA. Improving  - cont. plan as above, supplemental 02.    PVD (peripheral vascular disease).    - Based on previous documentation and family at bedside ulcers are about the same, no drainage, foul smell, or surrounding erythema to suggest this is source of infection.   - Will cont. with local wound care and off loading boots.   - Needs to f/u with Vascular (from early in March they wanted to see him in April of this year).   - Cont. ASA, statin.     Type 2 diabetes mellitus with diabetic peripheral angiopathy without gangrene, with long-term current use of insulin.    - ADA diet, HISS, Accuchecks AC/HS.     Arterial Ulcer of left heel, unspecified ulcer stage.   - plan as above. Family states patient didn't want any aggressive care for foot/ulcer.    Stage 4 chronic kidney disease.    - improved slightly from prior, will DC ARB, renally dose meds, avoid nephrotoxic drugs. Check renal U/S.     Hypertension, unspecified type.  Plan: Cont. o/p regimen, dash diet.     DVT ppx - patient on lovenox    DNR/DNI with MOLST in chart

## 2019-03-30 NOTE — PROGRESS NOTE ADULT - SUBJECTIVE AND OBJECTIVE BOX
CC: Fever, confusion, cough, Pneumonia (29 Mar 2019 23:10)    HPI:  84 y/o male sent from NH for above. 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 Patient was recently seen by Vascular for scheduled angiogram for ischemic non healing ulcer over left heel, however procedure was cancelled due to advanced CKD and risk of contrast nephropathy. Patient was at his usual baseline until over the course of the day he was noted to become more confused, coughing, and became hypoxic. He does have a hx of PNA as per family. He does not have any reported history of dysphagia on chart or by family. In the ED patient with RML infiltrate, fever, tachypnea, leukocytosis, and ruled in for sepsis. Lactate normal, MAP/SBP normal range. (29 Mar 2019 23:10)    INTERVAL HPI/OVERNIGHT EVENTS:    Vital Signs Last 24 Hrs  T(C): 36.7 (30 Mar 2019 08:00), Max: 38.3 (29 Mar 2019 19:50)  T(F): 98.1 (30 Mar 2019 08:00), Max: 101 (29 Mar 2019 19:50)  HR: 64 (30 Mar 2019 08:00) (64 - 98)  BP: 120/74 (30 Mar 2019 08:00) (120/74 - 149/74)  BP(mean): 94 (29 Mar 2019 23:10) (94 - 94)  RR: 18 (30 Mar 2019 08:00) (18 - 24)  SpO2: 99% (30 Mar 2019 08:00) (95% - 100%)  PHYSICAL EXAM:  General: Well developed; well nourished; in no acute distress  Eyes: PERRLA, EOMI; conjunctiva and sclera clear  Head: Normocephalic; atraumatic  ENMT: No nasal discharge; airway clear  Neck: Supple; non tender; no masses  Respiratory: No wheezes, rales or rhonchi  Cardiovascular: Regular rate and rhythm. S1 and S2 Normal; No murmurs, gallops or rubs  Gastrointestinal: Soft non-tender non-distended; Normal bowel sounds  Genitourinary: No costovertebral angle tenderness  Extremities: Normal range of motion, No clubbing, cyanosis or edema  Vascular: Peripheral pulses palpable 2+ bilaterally  Neurological: Alert and oriented x4  Skin: Warm and dry. No acute rash  Lymph Nodes: No acute cervical adenopathy  Musculoskeletal: Normal gait, tone, without deformities  Psychiatric: Cooperative and appropriate    I&O's Detail      CARDIAC MARKERS ( 29 Mar 2019 20:32 )  x     / 0.04 ng/mL / x     / x     / x                                9.2    8.9   )-----------( 191      ( 30 Mar 2019 07:35 )             29.9     29 Mar 2019 20:32    139    |  106    |  59.0   ----------------------------<  124    4.9     |  19.0   |  3.13     Ca    8.7        29 Mar 2019 20:32    TPro  7.2    /  Alb  3.4    /  TBili  0.4    /  DBili  x      /  AST  22     /  ALT  19     /  AlkPhos  210    29 Mar 2019 20:32    PTT - ( 29 Mar 2019 22:08 )  PTT:24.7 sec  CAPILLARY BLOOD GLUCOSE      POCT Blood Glucose.: 166 mg/dL (30 Mar 2019 07:51)    LIVER FUNCTIONS - ( 29 Mar 2019 20:32 )  Alb: 3.4 g/dL / Pro: 7.2 g/dL / ALK PHOS: 210 U/L / ALT: 19 U/L / AST: 22 U/L / GGT: x               MEDICATIONS  (STANDING):  aspirin enteric coated 81 milliGRAM(s) Oral daily  atorvastatin 40 milliGRAM(s) Oral at bedtime  dextrose 5%. 1000 milliLiter(s) (50 mL/Hr) IV Continuous <Continuous>  dextrose 50% Injectable 12.5 Gram(s) IV Push once  dextrose 50% Injectable 25 Gram(s) IV Push once  dextrose 50% Injectable 25 Gram(s) IV Push once  gabapentin 100 milliGRAM(s) Oral three times a day  heparin  Injectable 5000 Unit(s) SubCutaneous every 8 hours  insulin lispro (HumaLOG) corrective regimen sliding scale   SubCutaneous Before meals and at bedtime  metoprolol tartrate 25 milliGRAM(s) Oral three times a day  piperacillin/tazobactam IVPB. 3.375 Gram(s) IV Intermittent every 12 hours  saccharomyces boulardii 250 milliGRAM(s) Oral two times a day  sodium chloride 0.9% lock flush 3 milliLiter(s) IV Push every 8 hours  tamsulosin 0.4 milliGRAM(s) Oral at bedtime  vancomycin  IVPB 500 milliGRAM(s) IV Intermittent every 24 hours    MEDICATIONS  (PRN):  acetaminophen   Tablet .. 650 milliGRAM(s) Oral every 6 hours PRN Temp greater or equal to 38C (100.4F)  dextrose 40% Gel 15 Gram(s) Oral once PRN Blood Glucose LESS THAN 70 milliGRAM(s)/deciliter  glucagon  Injectable 1 milliGRAM(s) IntraMuscular once PRN Glucose LESS THAN 70 milligrams/deciliter  ondansetron Injectable 4 milliGRAM(s) IV Push every 6 hours PRN Nausea      RADIOLOGY & ADDITIONAL TESTS:

## 2019-03-31 LAB
ALBUMIN SERPL ELPH-MCNC: 2.8 G/DL — LOW (ref 3.3–5.2)
ALP SERPL-CCNC: 224 U/L — HIGH (ref 40–120)
ALT FLD-CCNC: 236 U/L — HIGH
ANION GAP SERPL CALC-SCNC: 15 MMOL/L — SIGNIFICANT CHANGE UP (ref 5–17)
AST SERPL-CCNC: 354 U/L — HIGH
BILIRUB SERPL-MCNC: 0.4 MG/DL — SIGNIFICANT CHANGE UP (ref 0.4–2)
BUN SERPL-MCNC: 65 MG/DL — HIGH (ref 8–20)
CALCIUM SERPL-MCNC: 8.8 MG/DL — SIGNIFICANT CHANGE UP (ref 8.6–10.2)
CHLORIDE SERPL-SCNC: 107 MMOL/L — SIGNIFICANT CHANGE UP (ref 98–107)
CO2 SERPL-SCNC: 16 MMOL/L — LOW (ref 22–29)
CREAT SERPL-MCNC: 3.29 MG/DL — HIGH (ref 0.5–1.3)
GLUCOSE BLDC GLUCOMTR-MCNC: 177 MG/DL — HIGH (ref 70–99)
GLUCOSE BLDC GLUCOMTR-MCNC: 193 MG/DL — HIGH (ref 70–99)
GLUCOSE BLDC GLUCOMTR-MCNC: 199 MG/DL — HIGH (ref 70–99)
GLUCOSE BLDC GLUCOMTR-MCNC: 94 MG/DL — SIGNIFICANT CHANGE UP (ref 70–99)
GLUCOSE SERPL-MCNC: 133 MG/DL — HIGH (ref 70–115)
HCT VFR BLD CALC: 32 % — LOW (ref 42–52)
HGB BLD-MCNC: 9.8 G/DL — LOW (ref 14–18)
MCHC RBC-ENTMCNC: 29 PG — SIGNIFICANT CHANGE UP (ref 27–31)
MCHC RBC-ENTMCNC: 30.6 G/DL — LOW (ref 32–36)
MCV RBC AUTO: 94.7 FL — HIGH (ref 80–94)
MRSA PCR RESULT.: SIGNIFICANT CHANGE UP
PLATELET # BLD AUTO: 197 K/UL — SIGNIFICANT CHANGE UP (ref 150–400)
POTASSIUM SERPL-MCNC: 4.5 MMOL/L — SIGNIFICANT CHANGE UP (ref 3.5–5.3)
POTASSIUM SERPL-SCNC: 4.5 MMOL/L — SIGNIFICANT CHANGE UP (ref 3.5–5.3)
PROT SERPL-MCNC: 6.4 G/DL — LOW (ref 6.6–8.7)
RBC # BLD: 3.38 M/UL — LOW (ref 4.6–6.2)
RBC # FLD: 15.9 % — HIGH (ref 11–15.6)
S AUREUS DNA NOSE QL NAA+PROBE: DETECTED
SODIUM SERPL-SCNC: 138 MMOL/L — SIGNIFICANT CHANGE UP (ref 135–145)
VANCOMYCIN TROUGH SERPL-MCNC: 9.8 UG/ML — LOW (ref 10–20)
WBC # BLD: 5.6 K/UL — SIGNIFICANT CHANGE UP (ref 4.8–10.8)
WBC # FLD AUTO: 5.6 K/UL — SIGNIFICANT CHANGE UP (ref 4.8–10.8)

## 2019-03-31 PROCEDURE — 99232 SBSQ HOSP IP/OBS MODERATE 35: CPT

## 2019-03-31 RX ORDER — CHLORHEXIDINE GLUCONATE 213 G/1000ML
1 SOLUTION TOPICAL DAILY
Qty: 0 | Refills: 0 | Status: DISCONTINUED | OUTPATIENT
Start: 2019-03-31 | End: 2019-04-04

## 2019-03-31 RX ADMIN — HEPARIN SODIUM 5000 UNIT(S): 5000 INJECTION INTRAVENOUS; SUBCUTANEOUS at 13:10

## 2019-03-31 RX ADMIN — Medication 250 MILLIGRAM(S): at 07:02

## 2019-03-31 RX ADMIN — SODIUM CHLORIDE 3 MILLILITER(S): 9 INJECTION INTRAMUSCULAR; INTRAVENOUS; SUBCUTANEOUS at 05:15

## 2019-03-31 RX ADMIN — Medication 81 MILLIGRAM(S): at 11:14

## 2019-03-31 RX ADMIN — ATORVASTATIN CALCIUM 40 MILLIGRAM(S): 80 TABLET, FILM COATED ORAL at 23:24

## 2019-03-31 RX ADMIN — Medication 2: at 16:20

## 2019-03-31 RX ADMIN — CHLORHEXIDINE GLUCONATE 1 APPLICATION(S): 213 SOLUTION TOPICAL at 10:58

## 2019-03-31 RX ADMIN — Medication 2: at 21:00

## 2019-03-31 RX ADMIN — TAMSULOSIN HYDROCHLORIDE 0.4 MILLIGRAM(S): 0.4 CAPSULE ORAL at 23:25

## 2019-03-31 RX ADMIN — GABAPENTIN 100 MILLIGRAM(S): 400 CAPSULE ORAL at 07:02

## 2019-03-31 RX ADMIN — GABAPENTIN 100 MILLIGRAM(S): 400 CAPSULE ORAL at 23:25

## 2019-03-31 RX ADMIN — GABAPENTIN 100 MILLIGRAM(S): 400 CAPSULE ORAL at 13:10

## 2019-03-31 RX ADMIN — HEPARIN SODIUM 5000 UNIT(S): 5000 INJECTION INTRAVENOUS; SUBCUTANEOUS at 07:03

## 2019-03-31 RX ADMIN — SODIUM CHLORIDE 3 MILLILITER(S): 9 INJECTION INTRAMUSCULAR; INTRAVENOUS; SUBCUTANEOUS at 22:00

## 2019-03-31 RX ADMIN — Medication 25 MILLIGRAM(S): at 23:25

## 2019-03-31 RX ADMIN — Medication 2: at 11:43

## 2019-03-31 RX ADMIN — Medication 25 MILLIGRAM(S): at 13:09

## 2019-03-31 RX ADMIN — SODIUM CHLORIDE 3 MILLILITER(S): 9 INJECTION INTRAMUSCULAR; INTRAVENOUS; SUBCUTANEOUS at 10:58

## 2019-03-31 RX ADMIN — Medication 100 MILLIGRAM(S): at 00:15

## 2019-03-31 RX ADMIN — PIPERACILLIN AND TAZOBACTAM 25 GRAM(S): 4; .5 INJECTION, POWDER, LYOPHILIZED, FOR SOLUTION INTRAVENOUS at 07:02

## 2019-03-31 RX ADMIN — Medication 25 MILLIGRAM(S): at 07:02

## 2019-03-31 RX ADMIN — Medication 250 MILLIGRAM(S): at 17:19

## 2019-03-31 RX ADMIN — HEPARIN SODIUM 5000 UNIT(S): 5000 INJECTION INTRAVENOUS; SUBCUTANEOUS at 23:28

## 2019-03-31 NOTE — PROGRESS NOTE ADULT - SUBJECTIVE AND OBJECTIVE BOX
CC: Fever, confusion, cough, Pneumonia (29 Mar 2019 23:10)    HPI:  84 y/o male sent from NH for above. 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 Patient was recently seen by Vascular for scheduled angiogram for ischemic non healing ulcer over left heel, however procedure was cancelled due to advanced CKD and risk of contrast nephropathy. Patient was at his usual baseline until over the course of the day he was noted to become more confused, coughing, and became hypoxic. He does have a hx of PNA as per family. He does not have any reported history of dysphagia on chart or by family. In the ED patient with RML infiltrate, fever, tachypnea, leukocytosis, and ruled in for sepsis. Lactate normal, MAP/SBP normal range. (29 Mar 2019 23:10)    INTERVAL HPI/OVERNIGHT EVENTS:  patient with urinary complaints today  will check a UA but he has a history of BPH in the past  no other complaints aside from dizziness today  patient fully alert with family by bedside (daughter and son-in-law)    ICU Vital Signs Last 24 Hrs  T(C): 36.6 (31 Mar 2019 09:06), Max: 36.7 (30 Mar 2019 20:34)  T(F): 97.8 (31 Mar 2019 09:06), Max: 98.1 (30 Mar 2019 20:34)  HR: 74 (31 Mar 2019 09:06) (69 - 75)  BP: 144/85 (31 Mar 2019 09:06) (126/74 - 144/85)  BP(mean): --  ABP: --  ABP(mean): --  RR: 18 (31 Mar 2019 09:06) (17 - 18)  SpO2: 99% (31 Mar 2019 09:06) (93% - 99%)    PHYSICAL EXAM:  General: Well developed; well nourished; in no acute distress  Eyes: PERRLA, EOMI; conjunctiva and sclera clear  Respiratory: crackles in the right lower lung field  Cardiovascular: Regular rate and rhythm. S1 and S2 Normal; No murmurs, gallops or rubs  Gastrointestinal: Soft non-tender non-distended; Normal bowel sounds  Genitourinary: No costovertebral angle tenderness  Extremities: Normal range of motion, No clubbing, cyanosis or edema  Vascular: Peripheral pulses palpable 2+ bilaterally  Neurological: Alert and oriented x4  Psychiatric: Cooperative and appropriate    I&O's Detail      CARDIAC MARKERS ( 29 Mar 2019 20:32 )  x     / 0.04 ng/mL / x     / x     / x                              9.8    5.6   )-----------( 197      ( 31 Mar 2019 09:03 )             32.0     03-31    138  |  107  |  65.0<H>  ----------------------------<  133<H>  4.5   |  16.0<L>  |  3.29<H>    Ca    8.8      31 Mar 2019 09:03    TPro  6.4<L>  /  Alb  2.8<L>  /  TBili  0.4  /  DBili  x   /  AST  354<H>  /  ALT  236<H>  /  AlkPhos  224<H>  03-31    CAPILLARY BLOOD GLUCOSE  POCT Blood Glucose.: 94 mg/dL (31 Mar 2019 07:12)  POCT Blood Glucose.: 158 mg/dL (30 Mar 2019 21:29)  POCT Blood Glucose.: 177 mg/dL (30 Mar 2019 17:24)  POCT Blood Glucose.: 217 mg/dL (30 Mar 2019 11:33)    LIVER FUNCTIONS - ( 29 Mar 2019 20:32 )  Alb: 3.4 g/dL / Pro: 7.2 g/dL / ALK PHOS: 210 U/L / ALT: 19 U/L / AST: 22 U/L / GGT: x           MEDICATIONS  (STANDING):  aspirin enteric coated 81 milliGRAM(s) Oral daily  atorvastatin 40 milliGRAM(s) Oral at bedtime  dextrose 5%. 1000 milliLiter(s) (50 mL/Hr) IV Continuous <Continuous>  dextrose 50% Injectable 12.5 Gram(s) IV Push once  dextrose 50% Injectable 25 Gram(s) IV Push once  dextrose 50% Injectable 25 Gram(s) IV Push once  gabapentin 100 milliGRAM(s) Oral three times a day  heparin  Injectable 5000 Unit(s) SubCutaneous every 8 hours  insulin lispro (HumaLOG) corrective regimen sliding scale   SubCutaneous Before meals and at bedtime  metoprolol tartrate 25 milliGRAM(s) Oral three times a day  piperacillin/tazobactam IVPB. 3.375 Gram(s) IV Intermittent every 12 hours  saccharomyces boulardii 250 milliGRAM(s) Oral two times a day  sodium chloride 0.9% lock flush 3 milliLiter(s) IV Push every 8 hours  tamsulosin 0.4 milliGRAM(s) Oral at bedtime  vancomycin  IVPB 500 milliGRAM(s) IV Intermittent every 24 hours    MEDICATIONS  (PRN):  acetaminophen   Tablet .. 650 milliGRAM(s) Oral every 6 hours PRN Temp greater or equal to 38C (100.4F)  dextrose 40% Gel 15 Gram(s) Oral once PRN Blood Glucose LESS THAN 70 milliGRAM(s)/deciliter  glucagon  Injectable 1 milliGRAM(s) IntraMuscular once PRN Glucose LESS THAN 70 milligrams/deciliter  ondansetron Injectable 4 milliGRAM(s) IV Push every 6 hours PRN Nausea    RADIOLOGY & ADDITIONAL TESTS:  < from: US Renal (03.30.19 @ 10:11) >  IMPRESSION:     No renal calculus or gross hydronephrosis is appreciated bilaterally.    < end of copied text >

## 2019-03-31 NOTE — PROGRESS NOTE ADULT - ASSESSMENT
A/P:  87 y/o male with history of MCI/early dementia, severe RLE PVD with chronic non-healing heel ulcer, CAD, HTN, HLD, DM-2, ICM with EF of 35% presents to the hospital with sepsis from [facility]. Patient much improved this morning in terms of mental status. Continuing IV antibiotics. Cardiac monitor discontinued on 3/30. Oxygen saturation by 3/30 already improved to 97% on 0.5 L of oxygen via NC.    HCAP (healthcare-associated pneumonia); likely gram positive/gram negative pneumonia.    - Based on living in LTC facility and sepsis with cover for HCAP gram neg/pos, MRSA and anaerobes pending cultures.   - cont. supplemental 02, nebs, chest pt, spiromter, OOB, DVT-P.  - patient saturating well on room air at this time and does not need O2  - patient can be transitioned to oral antibiotics today with PO levofloxacin 500mg q 48 hours to treat 7 days of pneumonia    Sepsis, due to pneumonia as above;  - follow up cultures  - obtain sputum culture - induced  - RVP negative  - patient with urinary symptoms now  - check UA (not collected in the ED)    Acute Hypoxic respiratory failure; due to PNA. Improving  - cont. plan as above, supplemental 02.    PVD (peripheral vascular disease).    - Based on previous documentation and family at bedside ulcers are about the same, no drainage, foul smell, or surrounding erythema to suggest this is source of infection.   - Will cont. with local wound care and off loading boots.   - Needs to f/u with Vascular (from early in March they wanted to see him in April of this year).   - Cont. ASA, statin.     Type 2 diabetes mellitus with diabetic peripheral angiopathy without gangrene, with long-term current use of insulin.    - ADA diet, HISS, Accuchecks AC/HS.     Arterial Ulcer of left heel, unspecified ulcer stage.   - plan as above. Family states patient didn't want any aggressive care for foot/ulcer.    Stage 4 chronic kidney disease.    - improved slightly from prior, will DC ARB, renally dose meds, avoid nephrotoxic drugs. Check renal U/S.     Hypertension, unspecified type.  Plan: Cont. o/p regimen, dash diet.     DVT ppx - patient on lovenox    DNR/DNI with MOLST in chart    DISPO: can discharge back to [facility] tomorrow on PO levofloxacin if no further events; follow up UA to see if signs of infection, if so can check urine culture

## 2019-04-01 ENCOUNTER — TRANSCRIPTION ENCOUNTER (OUTPATIENT)
Age: 84
End: 2019-04-01

## 2019-04-01 ENCOUNTER — APPOINTMENT (OUTPATIENT)
Dept: VASCULAR SURGERY | Facility: CLINIC | Age: 84
End: 2019-04-01

## 2019-04-01 LAB
ALBUMIN SERPL ELPH-MCNC: 2.8 G/DL — LOW (ref 3.3–5.2)
ALP SERPL-CCNC: 301 U/L — HIGH (ref 40–120)
ALT FLD-CCNC: 1067 U/L — HIGH
AMMONIA BLD-MCNC: 42 UMOL/L — SIGNIFICANT CHANGE UP (ref 11–55)
ANION GAP SERPL CALC-SCNC: 17 MMOL/L — SIGNIFICANT CHANGE UP (ref 5–17)
APPEARANCE UR: ABNORMAL
AST SERPL-CCNC: 1322 U/L — HIGH
BACTERIA # UR AUTO: ABNORMAL
BILIRUB SERPL-MCNC: 0.4 MG/DL — SIGNIFICANT CHANGE UP (ref 0.4–2)
BILIRUB UR-MCNC: NEGATIVE — SIGNIFICANT CHANGE UP
BUN SERPL-MCNC: 80 MG/DL — HIGH (ref 8–20)
CALCIUM SERPL-MCNC: 8.5 MG/DL — LOW (ref 8.6–10.2)
CHLORIDE SERPL-SCNC: 108 MMOL/L — HIGH (ref 98–107)
CO2 SERPL-SCNC: 16 MMOL/L — LOW (ref 22–29)
COLOR SPEC: YELLOW — SIGNIFICANT CHANGE UP
COMMENT - URINE: SIGNIFICANT CHANGE UP
CREAT SERPL-MCNC: 3.68 MG/DL — HIGH (ref 0.5–1.3)
DIFF PNL FLD: ABNORMAL
EPI CELLS # UR: NEGATIVE — SIGNIFICANT CHANGE UP
GLUCOSE BLDC GLUCOMTR-MCNC: 147 MG/DL — HIGH (ref 70–99)
GLUCOSE BLDC GLUCOMTR-MCNC: 167 MG/DL — HIGH (ref 70–99)
GLUCOSE BLDC GLUCOMTR-MCNC: 182 MG/DL — HIGH (ref 70–99)
GLUCOSE BLDC GLUCOMTR-MCNC: 194 MG/DL — HIGH (ref 70–99)
GLUCOSE SERPL-MCNC: 177 MG/DL — HIGH (ref 70–115)
GLUCOSE UR QL: 100 MG/DL
HCT VFR BLD CALC: 32.1 % — LOW (ref 42–52)
HGB BLD-MCNC: 10.2 G/DL — LOW (ref 14–18)
KETONES UR-MCNC: ABNORMAL
LEUKOCYTE ESTERASE UR-ACNC: ABNORMAL
MCHC RBC-ENTMCNC: 29.2 PG — SIGNIFICANT CHANGE UP (ref 27–31)
MCHC RBC-ENTMCNC: 31.8 G/DL — LOW (ref 32–36)
MCV RBC AUTO: 92 FL — SIGNIFICANT CHANGE UP (ref 80–94)
NITRITE UR-MCNC: NEGATIVE — SIGNIFICANT CHANGE UP
PH UR: 6 — SIGNIFICANT CHANGE UP (ref 5–8)
PLATELET # BLD AUTO: 150 K/UL — SIGNIFICANT CHANGE UP (ref 150–400)
POTASSIUM SERPL-MCNC: 5.2 MMOL/L — SIGNIFICANT CHANGE UP (ref 3.5–5.3)
POTASSIUM SERPL-SCNC: 5.2 MMOL/L — SIGNIFICANT CHANGE UP (ref 3.5–5.3)
PROCALCITONIN SERPL-MCNC: 0.35 NG/ML — HIGH (ref 0.02–0.1)
PROT SERPL-MCNC: 6.8 G/DL — SIGNIFICANT CHANGE UP (ref 6.6–8.7)
PROT UR-MCNC: 500 MG/DL
RBC # BLD: 3.49 M/UL — LOW (ref 4.6–6.2)
RBC # FLD: 15.8 % — HIGH (ref 11–15.6)
RBC CASTS # UR COMP ASSIST: ABNORMAL /HPF (ref 0–4)
SODIUM SERPL-SCNC: 141 MMOL/L — SIGNIFICANT CHANGE UP (ref 135–145)
SP GR SPEC: 1.01 — SIGNIFICANT CHANGE UP (ref 1.01–1.02)
UROBILINOGEN FLD QL: 1 MG/DL
WBC # BLD: 7.6 K/UL — SIGNIFICANT CHANGE UP (ref 4.8–10.8)
WBC # FLD AUTO: 7.6 K/UL — SIGNIFICANT CHANGE UP (ref 4.8–10.8)
WBC UR QL: >50

## 2019-04-01 PROCEDURE — 99232 SBSQ HOSP IP/OBS MODERATE 35: CPT | Mod: GC

## 2019-04-01 PROCEDURE — 99222 1ST HOSP IP/OBS MODERATE 55: CPT

## 2019-04-01 RX ORDER — MEROPENEM 1 G/30ML
500 INJECTION INTRAVENOUS EVERY 12 HOURS
Qty: 0 | Refills: 0 | Status: DISCONTINUED | OUTPATIENT
Start: 2019-04-01 | End: 2019-04-04

## 2019-04-01 RX ADMIN — GABAPENTIN 100 MILLIGRAM(S): 400 CAPSULE ORAL at 13:29

## 2019-04-01 RX ADMIN — Medication 250 MILLIGRAM(S): at 17:51

## 2019-04-01 RX ADMIN — GABAPENTIN 100 MILLIGRAM(S): 400 CAPSULE ORAL at 05:19

## 2019-04-01 RX ADMIN — Medication 25 MILLIGRAM(S): at 16:02

## 2019-04-01 RX ADMIN — ATORVASTATIN CALCIUM 40 MILLIGRAM(S): 80 TABLET, FILM COATED ORAL at 21:41

## 2019-04-01 RX ADMIN — Medication 2: at 21:41

## 2019-04-01 RX ADMIN — HEPARIN SODIUM 5000 UNIT(S): 5000 INJECTION INTRAVENOUS; SUBCUTANEOUS at 13:29

## 2019-04-01 RX ADMIN — MEROPENEM 100 MILLIGRAM(S): 1 INJECTION INTRAVENOUS at 17:52

## 2019-04-01 RX ADMIN — SODIUM CHLORIDE 3 MILLILITER(S): 9 INJECTION INTRAMUSCULAR; INTRAVENOUS; SUBCUTANEOUS at 22:13

## 2019-04-01 RX ADMIN — SODIUM CHLORIDE 3 MILLILITER(S): 9 INJECTION INTRAMUSCULAR; INTRAVENOUS; SUBCUTANEOUS at 11:19

## 2019-04-01 RX ADMIN — Medication 25 MILLIGRAM(S): at 05:19

## 2019-04-01 RX ADMIN — SODIUM CHLORIDE 3 MILLILITER(S): 9 INJECTION INTRAMUSCULAR; INTRAVENOUS; SUBCUTANEOUS at 05:19

## 2019-04-01 RX ADMIN — Medication 2: at 12:15

## 2019-04-01 RX ADMIN — GABAPENTIN 100 MILLIGRAM(S): 400 CAPSULE ORAL at 21:41

## 2019-04-01 RX ADMIN — CHLORHEXIDINE GLUCONATE 1 APPLICATION(S): 213 SOLUTION TOPICAL at 11:19

## 2019-04-01 RX ADMIN — Medication 2: at 16:02

## 2019-04-01 RX ADMIN — Medication 81 MILLIGRAM(S): at 11:20

## 2019-04-01 RX ADMIN — HEPARIN SODIUM 5000 UNIT(S): 5000 INJECTION INTRAVENOUS; SUBCUTANEOUS at 21:41

## 2019-04-01 RX ADMIN — Medication 25 MILLIGRAM(S): at 21:41

## 2019-04-01 RX ADMIN — HEPARIN SODIUM 5000 UNIT(S): 5000 INJECTION INTRAVENOUS; SUBCUTANEOUS at 05:18

## 2019-04-01 RX ADMIN — Medication 250 MILLIGRAM(S): at 05:19

## 2019-04-01 RX ADMIN — TAMSULOSIN HYDROCHLORIDE 0.4 MILLIGRAM(S): 0.4 CAPSULE ORAL at 21:41

## 2019-04-01 NOTE — PROGRESS NOTE ADULT - ASSESSMENT
Assessment and Plan:     87 y/o male with history of MCI/early dementia, severe RLE PVD with chronic non-healing heel ulcer, CAD, HTN, HLD, DM-2, ICM with EF of 35% presents to the hospital with sepsis from [facility]. Blood culture been negative. Saturating normal at room air. UA positive for infection. ID consulted. PT evaluation.       UTI:  Due to patient dementia, he is poor historian.   ID consulted  patient has hx of esbl ecoli before  f/u urine culture    Complicated pneumonia:  HCAP (healthcare-associated pneumonia); likely gram negative pneumonia.    - Based on living in LTC facility and sepsis with cover for HCAP gram neg/pos, MRSA and anaerobes   -Blood culture been negative  -Spiromter, OOB, DVT-P.  - patient saturating well on room air at this time and does not need O2  - patient can be transitioned to oral antibiotics today with PO levofloxacin 500mg q 48 hours to treat 7 days of pneumonia.      Acute Hypoxic respiratory failure; due to PNA. Improving  - cont. plan as above,     PVD (peripheral vascular disease).    - Based on previous documentation and family at bedside ulcers are about the same, no drainage, foul smell, or surrounding erythema to suggest this is source of infection.   - Will cont. with local wound care and off loading boots.   - Needs to f/u with Vascular (from early in March they wanted to see him in April of this year).   - Cont. ASA, statin.     Type 2 diabetes mellitus with diabetic peripheral angiopathy without gangrene, with long-term current use of insulin.    - ADA diet, HISS, Accuchecks AC/HS.     Arterial Ulcer of left heel, unspecified ulcer stage.   - plan as above. Family states patient didn't want any aggressive care for foot/ulcer.    Stage 4 chronic kidney disease.    - improved slightly from prior, will DC ARB, renally dose meds, avoid nephrotoxic drugs.   US renal showed no evidence of renal stone and hydronephrosis    Hypertension, unspecified type.  Plan: Cont. o/p regimen, dash diet.     DVT ppx - patient on lovenox    DNR/DNI with MOLST in chart Assessment and Plan:     87 y/o male with history of MCI/early dementia, severe RLE PVD with chronic non-healing heel ulcer, CAD, HTN, HLD, DM-2, ICM with EF of 35% presents to the hospital with sepsis from [facility]. Blood culture been negative. Saturating normal at room air. UA positive for infection. ID consulted. PT evaluation.       UTI:  Due to patient dementia, he is poor historian.   ID consulted  patient has hx of esbl ecoli before  f/u urine culture    Complicated pneumonia:  HCAP (healthcare-associated pneumonia); likely gram negative pneumonia.    - Based on living in LTC facility and sepsis with cover for HCAP gram neg/pos, MRSA and anaerobes   -Blood culture been negative  -Spiromter, OOB, DVT-P.  - patient saturating well on room air at this time and does not need O2  - patient can be transitioned to oral antibiotics today with PO levofloxacin 500mg q 48 hours to treat 7 days of pneumonia.    TRANSAMINITIS:  could be due to medication related or infection  Hepatitis profile  ABD is benign  f/i lft from today    Acute Hypoxic respiratory failure; due to PNA. Improving  - cont. plan as above,     PVD (peripheral vascular disease).    - Based on previous documentation and family at bedside ulcers are about the same, no drainage, foul smell, or surrounding erythema to suggest this is source of infection.   - Will cont. with local wound care and off loading boots.   - Needs to f/u with Vascular (from early in March they wanted to see him in April of this year).   - Cont. ASA, statin.   WILL consult wound care nurse    Type 2 diabetes mellitus with diabetic peripheral angiopathy without gangrene, with long-term current use of insulin.    - ADA diet, HISS, Accuchecks AC/HS.     Arterial Ulcer of left heel, unspecified ulcer stage.   - plan as above. Family states patient didn't want any aggressive care for foot/ulcer.    Stage 4 chronic kidney disease.    - improved slightly from prior, will DC ARB, renally dose meds, avoid nephrotoxic drugs.   US renal showed no evidence of renal stone and hydronephrosis    Hypertension, unspecified type.  Plan: Cont. o/p regimen, dash diet.     DVT ppx - patient on lovenox    DNR/DNI with MOLST in chart

## 2019-04-01 NOTE — DISCHARGE NOTE PROVIDER - HOSPITAL COURSE
85 y/o male with history of MCI/early dementia, severe RLE PVD with chronic non-healing heel ulcer, CAD, HTN, HLD, DM-2, ICM with EF of 35% presents to the hospital with sepsis from [facility]. Pt improved on IV abx and was transitioned to oral antibiotics on 3/31. Pt is stable for discharge back to [facility]. \[facility].\"". patient will need outpatinet follow up with vascular surgeon for his PVD. 87 y/o male with history of MCI/early dementia, severe RLE PVD with chronic non-healing heel ulcer, CAD, HTN, HLD, DM-2, ICM with EF of 35% presents to the hospital with sepsis from [facility]. Pt improved on IV abx and was transitioned to oral antibiotics on 3/31. Pt is stable for discharge back to [facility]. Patient will need outpatinet follow up with vascular surgeon for his chronic PVD. 87 y/o male with history of MCI/early dementia, severe RLE PVD with chronic non-healing heel ulcer, CAD, HTN, HLD, DM-2, ICM with EF of 35% presents to the hospital with sepsis from [facility]. Blood culture been negative. Saturating normal at room air. Course complicated by elevated bun/cr, transaminitis, UTI and Repeat TTE showed severely reduced EF <20%. Cardiology consulted and recommended medical management given high risk and CKD. Patient is DNR/DNI. Family decided comfort measures only, Do not rehospitalize, no IV fluids.        Acute Metabolic encephalopathy:    secondary to UTI, Pneumonia, Transaminitis elevated bun/cr    GI, ID AND Nephrology note appreciated    As per GI elevated LFT due to ischemia from CHF <20 EF    ID mentioned Completed about 3 days for possible UTI. can stop Abx.         Complicated pneumonia:    HCAP (healthcare-associated pneumonia); likely gram negative pneumonia.      completed treatment        TRANSAMINITIS:    Likely due to ischemia, TTE showed (EF < 20%)    US abdomen showed cholelithiasis but no evidence of cholecystitis, with normal cbd    LFT trending down, GI signed off    Hepatitis profile negative    statin on hold            Acute Hypoxic respiratory failure; due to PNA.     Improved            SeverePVD (peripheral vascular disease).      AWILDA/PVR results discussed with patient family, vascular and podiatry involved.    Family decided comfort measures only, no surgical intervention.     - Cont. ASA,             Type 2 diabetes mellitus with diabetic peripheral angiopathy without gangrene, with long-term current use of insulin.      HBAIC 7.5    - ADA diet, HISS, Accu checks AC/HS.     insulin decreased to lantus 5 U        Arterial Ulcer of left heel, unspecified ulcer stage.     - plan as above. Family states patient didn't want any aggressive care for foot/ulcer.        CRYSTAL on CKD:    COMFORT MEAURES ONLY, Family do not want HD    family decided no IV fluid given his low EF and risk of fluid overload    Avoid nephrotoxic drugs.     US renal showed no evidence of renal stone and hydronephrosis        Hypertension, unspecified type.  Plan: Cont. o/p regimen, dash diet.         Problem: HFrEF (heart failure with reduced ejection fraction).     Recommendation: - TTE, EF <20% drastically deteriorated from 2017    - change Lopressor to Toprol XL 50mg QD, for better Heart Failure management    - no invasive procedures, such as cath,  at this time due to CKD (would commit him to hemodialysis if so)    - c/w medical management due to overall poor prognosis.            ·  Problem: Coronary artery disease involving native coronary artery of native heart without angina pectoris.  Recommendation: continue aspirin, beta blocker, statin    no invasive therapies at this time.         GOALS of care;     Family decided comfort measures only, DNR/DNI    No IV fluids, no blood draws, no re hospitalization                Vital Signs Last 24 Hrs    T(C): 36.8 (04 Apr 2019 09:02), Max: 37.2 (04 Apr 2019 00:04)    T(F): 98.2 (04 Apr 2019 09:02), Max: 99 (04 Apr 2019 00:04)    HR: 79 (04 Apr 2019 09:02) (71 - 79)    BP: 137/64 (04 Apr 2019 09:02) (119/59 - 137/64)    BP(mean): --    RR: 18 (04 Apr 2019 09:02) (18 - 18)    SpO2: 98% (04 Apr 2019 09:02) (96% - 98%)        PHYSICAL EXAM:    General: awake and alert, fragile, on room air.    Eyes: PERRLA, EOMI; conjunctiva and sclera clear    Respiratory: rales in the right lower lung field    Cardiovascular: Regular rate and rhythm. S1 and S2 Normal;     Gastrointestinal: Soft non-tender non-distended; Normal bowel sounds    Genitourinary: No costovertebral angle tenderness    Extremities: Normal range of motion, No clubbing, cyanosis or edema    Vascular:  Left tma , healed surgical margins. left lateral healed ulceration covered with dressing.    Neurological: Alert and oriented x1            35 min spent in discharging the patient

## 2019-04-01 NOTE — CONSULT NOTE ADULT - ASSESSMENT
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   He has been started on ABx initially ceftriaxone and azithromycin and after 2 days switched to levaquin. Today even though on ABx had a very high WBC in urine and he has history of ESBL ecoli UTI multiple times in the past.     Pneumonia  UTI  DM  PVD and foot ulcer     - Blood culture neg  - UA with high WBC  - Will stop levaquin  - Start Meropenem 1gm q8h due to history of ESBL twice in the past.   - Foot ulcer need wound care and podiatry/vascular follow up.   - Pneumonia improved, but needs few more days that meropenem can cover for it as well.   - based on UC result can modify ABx regimen.   - Will send legionella Urinary Ag  - LFTs are high , will do Hep Panel, could be sepsis or medication related.     Will follow. 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   He has been started on ABx initially ceftriaxone and azithromycin and after 2 days switched to levaquin. Today even though on ABx had a very high WBC in urine and he has history of ESBL ecoli UTI multiple times in the past.     Pneumonia  UTI  DM  PVD and foot ulcer     - Blood culture neg  - UA with high WBC  - Will stop levaquin  - Start Meropenem 500mg q12h due to history of ESBL twice in the past.   - Foot ulcer need wound care and podiatry/vascular follow up.   - Pneumonia improved, but needs few more days that meropenem can cover for it as well.   - based on UC result can modify ABx regimen.   - Will send legionella Urinary Ag  - LFTs are high , will do Hep Panel, could be sepsis or medication related.     Will follow.

## 2019-04-01 NOTE — DISCHARGE NOTE PROVIDER - NSDCCPCAREPLAN_GEN_ALL_CORE_FT
PRINCIPAL DISCHARGE DIAGNOSIS  Diagnosis: Pneumonia  Assessment and Plan of Treatment: To complete 7 day course of antibiotics as prescribed. Follow up wtih PMD at nursing facility.      SECONDARY DISCHARGE DIAGNOSES  Diagnosis: Hypertension  Assessment and Plan of Treatment: Resume home meds    Diagnosis: PVD (peripheral vascular disease)  Assessment and Plan of Treatment: Follow up with outpatient vascular surgeon PRINCIPAL DISCHARGE DIAGNOSIS  Diagnosis: Pneumonia  Assessment and Plan of Treatment: To complete 7 day course of antibiotics as prescribed. Follow up wtih PMD at nursing facility.      SECONDARY DISCHARGE DIAGNOSES  Diagnosis: CKD (chronic kidney disease)  Assessment and Plan of Treatment: Follow up with outpatinet nephrologist    Diagnosis: Hypertension  Assessment and Plan of Treatment: Resume home meds    Diagnosis: PVD (peripheral vascular disease)  Assessment and Plan of Treatment: Follow up with outpatient vascular surgeon PRINCIPAL DISCHARGE DIAGNOSIS  Diagnosis: Pneumonia  Assessment and Plan of Treatment: To complete 7 day course of antibiotics as prescribed. Follow up wtih PMD at nursing facility.      SECONDARY DISCHARGE DIAGNOSES  Diagnosis: Sepsis due to pneumonia  Assessment and Plan of Treatment: resolved    Diagnosis: Type 2 diabetes mellitus  Assessment and Plan of Treatment: Resume home meds    Diagnosis: CKD (chronic kidney disease)  [facility]    Diagnosis: Hypertension  Assessment and Plan of Treatment: Resume home meds    Diagnosis: PVD (peripheral vascular disease)  Assessment and Plan of Treatment: Follow up with outpatient vascular surgeon PRINCIPAL DISCHARGE DIAGNOSIS  Diagnosis: Pneumonia  Assessment and Plan of Treatment: To complete 7 day course of antibiotics as prescribed. Follow up wtih PMD at nursing facility.      SECONDARY DISCHARGE DIAGNOSES  Diagnosis: Sepsis due to pneumonia  Assessment and Plan of Treatment: resolved    Diagnosis: Type 2 diabetes mellitus  Assessment and Plan of Treatment: Resume home meds    Diagnosis: CKD (chronic kidney disease)  [facility]    Diagnosis: Hypertension  Assessment and Plan of Treatment: Resume home meds    Diagnosis: PVD (peripheral vascular disease)  Assessment and Plan of Treatment: Follow up with outpatient vascular surgeon

## 2019-04-01 NOTE — CONSULT NOTE ADULT - SUBJECTIVE AND OBJECTIVE BOX
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   He has been started on ABx initially ceftriaxone and azithromycin and after 2 days switched to levaquin. Today even though on ABx had a very high WBC in urine and he has history of ESBL ecoli UTI multiple times in the past.  Patient was recently seen by Vascular for scheduled angiogram for ischemic non healing ulcer over left heel(MRSA in the past, however procedure was cancelled due to advanced CKD and risk of contrast nephropathy. Patient was at his usual baseline until over the course of the day he was noted to become more confused, coughing, and became hypoxic. Pneumonia has been improving since admission.     PAST MEDICAL & SURGICAL HISTORY:  Poor historian  Ulcer of foot: left heel  PVD (peripheral vascular disease)  Coronary heart disease  Chronic kidney disease  History of amputation of toe: every toe on left foot has been amputated  Pacemaker  PVD (peripheral vascular disease)  CRI (chronic renal insufficiency), stage 3 (moderate)  HTN (hypertension)  Diabetes  History of peripheral artery bypass: with vein graft and TMA in Aug 2017  History of amputation of toe: all toes on left foot have been amputated  Cardiac pacemaker: to right anterior chest wall    FAMILY HISTORY:  No pertinent family history in first degree relatives    Allergies  No Known Allergies    Antibiotics:  levoFLOXacin  Tablet 500 milliGRAM(s) Oral every 48 hours     REVIEW OF SYSTEMS:  as above     Physical Exam:  Vital Signs Last 24 Hrs  T(C): 36.6 (2019 08:09), Max: 36.6 (2019 08:09)  T(F): 97.9 (2019 08:09), Max: 97.9 (2019 08:09)  HR: 75 (2019 08:09) (72 - 78)  BP: 142/84 (2019 08:09) (134/87 - 142/84)  BP(mean): 142 (31 Mar 2019 16:23) (142 - 142)  RR: 19 (2019 08:09) (18 - 19)  SpO2: 98% (31 Mar 2019 23:22) (98% - 98%)  Height (cm): 172.72 ( @ 16:26)  GEN: NAD, lethargic but opens his eyes and answers questions (just received pain medication?)  HEENT: normocephalic and atraumatic. EOMI. PERRL.    NECK: Supple.  No lymphadenopathy   LUNGS: Clear to auscultation.  HEART: Regular rate and rhythm without murmur.  ABDOMEN: Soft, nontender, and nondistended.  Positive bowel sounds.    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   PSYCHIATRIC: Appropriate affect .  SKIN: as above.     Labs:      138  |  107  |  65.0<H>  ----------------------------<  133<H>  4.5   |  16.0<L>  |  3.29<H>    Ca    8.8      31 Mar 2019 09:03    TPro  6.4<L>  /  Alb  2.8<L>  /  TBili  0.4  /  DBili  x   /  AST  354<H>  /  ALT  236<H>  /  AlkPhos  224<H>                          9.8    5.6   )-----------( 197      ( 31 Mar 2019 09:03 )             32.0     Urinalysis Basic - ( 2019 09:53 )    Color: Yellow / Appearance: Slightly Turbid / S.015 / pH: x  Gluc: x / Ketone: Trace  / Bili: Negative / Urobili: 1 mg/dL   Blood: x / Protein: 500 mg/dL / Nitrite: Negative   Leuk Esterase: Moderate / RBC: 6-10 /HPF / WBC >50   Sq Epi: x / Non Sq Epi: Negative / Bacteria: Moderate    LIVER FUNCTIONS - ( 31 Mar 2019 09:03 )  Alb: 2.8 g/dL / Pro: 6.4 g/dL / ALK PHOS: 224 U/L / ALT: 236 U/L / AST: 354 U/L / GGT: x           RECENT CULTURES:   @ 08:52      NotDetec     @ 20:55 .Blood     No growth at 48 hours    All imaging and other data have been reviewed.

## 2019-04-01 NOTE — PROGRESS NOTE ADULT - SUBJECTIVE AND OBJECTIVE BOX
JUAN MIGUEL PARADA    [id number]    85y      Male    CC: Fever, confusion, cough, Pneumonia (29 Mar 2019 23:10)    overnight events:  Was seen and examined at bedside, he was sleeping this am, and he open eyes by verbal commands, follow simple commands. Patient is poor historian.  daughter at bedside, he is not acting as usual. As per RN patient was confused last night and didn't slept last night. As per daughter patient had frequent UTI. UA was sent and was positive, ID consulted.    HPI:  84 y/o male sent from NH for above. 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 Patient was recently seen by Vascular for scheduled angiogram for ischemic non healing ulcer over left heel, however procedure was cancelled due to advanced CKD and risk of contrast nephropathy. Patient was at his usual baseline until over the course of the day he was noted to become more confused, coughing, and became hypoxic. He does have a hx of PNA as per family. He does not have any reported history of dysphagia on chart or by family. In the ED patient with RML infiltrate, fever, tachypnea, leukocytosis, and ruled in for sepsis. Lactate normal, MAP/SBP normal range. (29 Mar 2019 23:10)      Vital Signs Last 24 Hrs  T(C): 36.6 (2019 08:09), Max: 36.6 (2019 08:09)  T(F): 97.9 (2019 08:09), Max: 97.9 (2019 08:09)  HR: 75 (2019 08:09) (72 - 78)  BP: 142/84 (2019 08:09) (134/87 - 142/84)  BP(mean): 142 (31 Mar 2019 16:23) (142 - 142)  RR: 19 (2019 08:09) (18 - 19)  SpO2: 98% (31 Mar 2019 23:22) (98% - 98%)      PHYSICAL EXAM:  General: Well developed; well nourished; in no acute distress  Eyes: PERRLA, EOMI; conjunctiva and sclera clear  Respiratory: crackles in the right lower lung field  Cardiovascular: Regular rate and rhythm. S1 and S2 Normal; No murmurs, gallops or rubs  Gastrointestinal: Soft non-tender non-distended; Normal bowel sounds  Genitourinary: No costovertebral angle tenderness  Extremities: Normal range of motion, No clubbing, cyanosis or edema  Vascular: Peripheral pulses palpable 2+ bilaterally  Neurological: Alert and oriented x4  Psychiatric: Cooperative and appropriate          LABS:                        9.8    5.6   )-----------( 197      ( 31 Mar 2019 09:03 )             32.0     03    138  |  107  |  65.0<H>  ----------------------------<  133<H>  4.5   |  16.0<L>  |  3.29<H>    Ca    8.8      31 Mar 2019 09:03    TPro  6.4<L>  /  Alb  2.8<L>  /  TBili  0.4  /  DBili  x   /  AST  354<H>  /  ALT  236<H>  /  AlkPhos  224<H>  03      Urinalysis Basic - ( 2019 09:53 )    Color: Yellow / Appearance: Slightly Turbid / S.015 / pH: x  Gluc: x / Ketone: Trace  / Bili: Negative / Urobili: 1 mg/dL   Blood: x / Protein: 500 mg/dL / Nitrite: Negative   Leuk Esterase: Moderate / RBC: 6-10 /HPF / WBC >50   Sq Epi: x / Non Sq Epi: Negative / Bacteria: Moderate          MEDICATIONS  (STANDING):  aspirin enteric coated 81 milliGRAM(s) Oral daily  atorvastatin 40 milliGRAM(s) Oral at bedtime  chlorhexidine 2% Cloths 1 Application(s) Topical daily  dextrose 5%. 1000 milliLiter(s) (50 mL/Hr) IV Continuous <Continuous>  dextrose 50% Injectable 12.5 Gram(s) IV Push once  dextrose 50% Injectable 25 Gram(s) IV Push once  dextrose 50% Injectable 25 Gram(s) IV Push once  gabapentin 100 milliGRAM(s) Oral three times a day  heparin  Injectable 5000 Unit(s) SubCutaneous every 8 hours  insulin lispro (HumaLOG) corrective regimen sliding scale   SubCutaneous Before meals and at bedtime  levoFLOXacin  Tablet 500 milliGRAM(s) Oral every 48 hours  metoprolol tartrate 25 milliGRAM(s) Oral three times a day  saccharomyces boulardii 250 milliGRAM(s) Oral two times a day  sodium chloride 0.9% lock flush 3 milliLiter(s) IV Push every 8 hours  tamsulosin 0.4 milliGRAM(s) Oral at bedtime    MEDICATIONS  (PRN):  acetaminophen   Tablet .. 650 milliGRAM(s) Oral every 6 hours PRN Temp greater or equal to 38C (100.4F)  dextrose 40% Gel 15 Gram(s) Oral once PRN Blood Glucose LESS THAN 70 milliGRAM(s)/deciliter  glucagon  Injectable 1 milliGRAM(s) IntraMuscular once PRN Glucose LESS THAN 70 milligrams/deciliter  ondansetron Injectable 4 milliGRAM(s) IV Push every 6 hours PRN Nausea      RADIOLOGY & ADDITIONAL TESTS:

## 2019-04-01 NOTE — PHYSICAL THERAPY INITIAL EVALUATION ADULT - CRITERIA FOR SKILLED THERAPEUTIC INTERVENTIONS
rehab potential/predicted duration of therapy intervention/functional limitations in following categories/therapy frequency/anticipated discharge recommendation/impairments found

## 2019-04-01 NOTE — PHYSICAL THERAPY INITIAL EVALUATION ADULT - ADDITIONAL COMMENTS
pt a poor historian: states does not walk, as per medical chart, pt from [facility]

## 2019-04-02 LAB
ALBUMIN SERPL ELPH-MCNC: 2.8 G/DL — LOW (ref 3.3–5.2)
ALP SERPL-CCNC: 254 U/L — HIGH (ref 40–120)
ALT FLD-CCNC: 802 U/L — HIGH
ANION GAP SERPL CALC-SCNC: 19 MMOL/L — HIGH (ref 5–17)
AST SERPL-CCNC: 538 U/L — HIGH
BILIRUB SERPL-MCNC: 0.5 MG/DL — SIGNIFICANT CHANGE UP (ref 0.4–2)
BUN SERPL-MCNC: 85 MG/DL — HIGH (ref 8–20)
CALCIUM SERPL-MCNC: 8.4 MG/DL — LOW (ref 8.6–10.2)
CHLORIDE SERPL-SCNC: 106 MMOL/L — SIGNIFICANT CHANGE UP (ref 98–107)
CO2 SERPL-SCNC: 15 MMOL/L — LOW (ref 22–29)
CREAT SERPL-MCNC: 3.95 MG/DL — HIGH (ref 0.5–1.3)
GLUCOSE BLDC GLUCOMTR-MCNC: 125 MG/DL — HIGH (ref 70–99)
GLUCOSE BLDC GLUCOMTR-MCNC: 139 MG/DL — HIGH (ref 70–99)
GLUCOSE BLDC GLUCOMTR-MCNC: 181 MG/DL — HIGH (ref 70–99)
GLUCOSE BLDC GLUCOMTR-MCNC: 266 MG/DL — HIGH (ref 70–99)
GLUCOSE SERPL-MCNC: 132 MG/DL — HIGH (ref 70–115)
HAV IGM SER-ACNC: SIGNIFICANT CHANGE UP
HAV IGM SER-ACNC: SIGNIFICANT CHANGE UP
HBV CORE IGM SER-ACNC: SIGNIFICANT CHANGE UP
HBV CORE IGM SER-ACNC: SIGNIFICANT CHANGE UP
HBV SURFACE AG SER-ACNC: SIGNIFICANT CHANGE UP
HBV SURFACE AG SER-ACNC: SIGNIFICANT CHANGE UP
HCV AB S/CO SERPL IA: 0.38 S/CO — SIGNIFICANT CHANGE UP (ref 0–0.79)
HCV AB S/CO SERPL IA: 0.38 S/CO — SIGNIFICANT CHANGE UP (ref 0–0.79)
HCV AB SERPL-IMP: SIGNIFICANT CHANGE UP
HCV AB SERPL-IMP: SIGNIFICANT CHANGE UP
LEGIONELLA AG UR QL: NEGATIVE — SIGNIFICANT CHANGE UP
POTASSIUM SERPL-MCNC: 4.8 MMOL/L — SIGNIFICANT CHANGE UP (ref 3.5–5.3)
POTASSIUM SERPL-SCNC: 4.8 MMOL/L — SIGNIFICANT CHANGE UP (ref 3.5–5.3)
PROT SERPL-MCNC: 6.2 G/DL — LOW (ref 6.6–8.7)
SODIUM SERPL-SCNC: 140 MMOL/L — SIGNIFICANT CHANGE UP (ref 135–145)

## 2019-04-02 PROCEDURE — 93306 TTE W/DOPPLER COMPLETE: CPT | Mod: 26

## 2019-04-02 PROCEDURE — 99223 1ST HOSP IP/OBS HIGH 75: CPT

## 2019-04-02 PROCEDURE — 99221 1ST HOSP IP/OBS SF/LOW 40: CPT

## 2019-04-02 PROCEDURE — 99232 SBSQ HOSP IP/OBS MODERATE 35: CPT

## 2019-04-02 PROCEDURE — 76700 US EXAM ABDOM COMPLETE: CPT | Mod: 26

## 2019-04-02 PROCEDURE — 99233 SBSQ HOSP IP/OBS HIGH 50: CPT | Mod: GC

## 2019-04-02 PROCEDURE — 93923 UPR/LXTR ART STDY 3+ LVLS: CPT | Mod: 26

## 2019-04-02 RX ORDER — SODIUM BICARBONATE 1 MEQ/ML
650 SYRINGE (ML) INTRAVENOUS THREE TIMES A DAY
Qty: 0 | Refills: 0 | Status: DISCONTINUED | OUTPATIENT
Start: 2019-04-02 | End: 2019-04-05

## 2019-04-02 RX ORDER — SODIUM CHLORIDE 9 MG/ML
1000 INJECTION, SOLUTION INTRAVENOUS
Qty: 0 | Refills: 0 | Status: DISCONTINUED | OUTPATIENT
Start: 2019-04-02 | End: 2019-04-04

## 2019-04-02 RX ORDER — CADEXOMER IODINE 0.9 %
1 PADS, MEDICATED (EA) TOPICAL DAILY
Qty: 0 | Refills: 0 | Status: DISCONTINUED | OUTPATIENT
Start: 2019-04-02 | End: 2019-04-05

## 2019-04-02 RX ADMIN — Medication 250 MILLIGRAM(S): at 05:43

## 2019-04-02 RX ADMIN — SODIUM CHLORIDE 3 MILLILITER(S): 9 INJECTION INTRAMUSCULAR; INTRAVENOUS; SUBCUTANEOUS at 22:23

## 2019-04-02 RX ADMIN — Medication 650 MILLIGRAM(S): at 22:22

## 2019-04-02 RX ADMIN — Medication 250 MILLIGRAM(S): at 18:03

## 2019-04-02 RX ADMIN — Medication 25 MILLIGRAM(S): at 05:43

## 2019-04-02 RX ADMIN — Medication 25 MILLIGRAM(S): at 14:06

## 2019-04-02 RX ADMIN — MEROPENEM 100 MILLIGRAM(S): 1 INJECTION INTRAVENOUS at 18:02

## 2019-04-02 RX ADMIN — Medication 25 MILLIGRAM(S): at 22:22

## 2019-04-02 RX ADMIN — HEPARIN SODIUM 5000 UNIT(S): 5000 INJECTION INTRAVENOUS; SUBCUTANEOUS at 05:43

## 2019-04-02 RX ADMIN — Medication 6: at 22:31

## 2019-04-02 RX ADMIN — Medication 81 MILLIGRAM(S): at 12:11

## 2019-04-02 RX ADMIN — GABAPENTIN 100 MILLIGRAM(S): 400 CAPSULE ORAL at 05:43

## 2019-04-02 RX ADMIN — Medication 650 MILLIGRAM(S): at 12:16

## 2019-04-02 RX ADMIN — HEPARIN SODIUM 5000 UNIT(S): 5000 INJECTION INTRAVENOUS; SUBCUTANEOUS at 22:22

## 2019-04-02 RX ADMIN — CHLORHEXIDINE GLUCONATE 1 APPLICATION(S): 213 SOLUTION TOPICAL at 11:25

## 2019-04-02 RX ADMIN — Medication 650 MILLIGRAM(S): at 15:34

## 2019-04-02 RX ADMIN — HEPARIN SODIUM 5000 UNIT(S): 5000 INJECTION INTRAVENOUS; SUBCUTANEOUS at 14:06

## 2019-04-02 RX ADMIN — ATORVASTATIN CALCIUM 40 MILLIGRAM(S): 80 TABLET, FILM COATED ORAL at 22:22

## 2019-04-02 RX ADMIN — Medication 2: at 18:04

## 2019-04-02 RX ADMIN — MEROPENEM 100 MILLIGRAM(S): 1 INJECTION INTRAVENOUS at 05:43

## 2019-04-02 RX ADMIN — TAMSULOSIN HYDROCHLORIDE 0.4 MILLIGRAM(S): 0.4 CAPSULE ORAL at 22:22

## 2019-04-02 RX ADMIN — SODIUM CHLORIDE 3 MILLILITER(S): 9 INJECTION INTRAMUSCULAR; INTRAVENOUS; SUBCUTANEOUS at 11:25

## 2019-04-02 RX ADMIN — SODIUM CHLORIDE 3 MILLILITER(S): 9 INJECTION INTRAMUSCULAR; INTRAVENOUS; SUBCUTANEOUS at 05:44

## 2019-04-02 NOTE — PROGRESS NOTE ADULT - ASSESSMENT
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   He has been started on ABx initially ceftriaxone and azithromycin and after 2 days switched to levaquin. Today even though on ABx had a very high WBC in urine and he has history of ESBL ecoli UTI multiple times in the past.     Pneumonia  UTI  DM  PVD and foot ulcer     - Blood culture neg  - UA with high WBC  - Renal USG neg   - Continue Meropenem 500mg q12h due to history of ESBL twice in the past.   - Foot ulcer need wound care and podiatry/vascular follow up.   - Pneumonia improved, but needs few more days that meropenem can cover for it as well. Will treat at least for 7days total.   - Follow up legionella Urinary Ag  - Trend LFTs, Seen by GI, work up pending.     Will follow. 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   He has been started on ABx initially ceftriaxone and azithromycin and after 2 days switched to levaquin. Today even though on ABx had a very high WBC in urine and he has history of ESBL ecoli UTI multiple times in the past.     Pneumonia  UTI  DM  PVD and foot ulcer     - Blood culture neg  - UA with high WBC  - Renal USG neg   - Continue Meropenem 500mg q12h due to history of ESBL twice in the past.   - Foot ulcer need wound care and podiatry/vascular follow up.   - Pneumonia improved, but needs few more days that meropenem can cover for it as well. Will treat at least for 7days total.   - Follow up legionella Urinary Ag  - Trend LFTs, Seen by GI, work up pending.   - Discussed with daughter at bedside    Will follow.

## 2019-04-02 NOTE — PROGRESS NOTE ADULT - SUBJECTIVE AND OBJECTIVE BOX
JUAN MIGUEL PARADA    [id number]    85y      Male      CC: Fever, confusion, cough, Pneumonia (29 Mar 2019 23:10)    UTI, hx of esbl ecoli uti  transaminitis  elevated bun/cr  cholelithiasis  Acute metabolic encephalopthy    overnight events:  Was seen and examined at bedside, he open eyes by his name and goes back to sleep. Daughter Chanda Cuevas at bedside and mentioned she mentioned that mental status is better than yesterday. she was informed of his Elevated bun/cr, elevated LFT and UTI  id and gi consulted.     HPI:  84 y/o male sent from NH for above. 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 Patient was recently seen by Vascular for scheduled angiogram for ischemic non healing ulcer over left heel, however procedure was cancelled due to advanced CKD and risk of contrast nephropathy. Patient was at his usual baseline until over the course of the day he was noted to become more confused, coughing, and became hypoxic. He does have a hx of PNA as per family. He does not have any reported history of dysphagia on chart or by family. In the ED patient with RML infiltrate, fever, tachypnea, leukocytosis, and ruled in for sepsis. Lactate normal, MAP/SBP normal range. (29 Mar 2019 23:10)      Vital Signs Last 24 Hrs  T(C): 37.2 (2019 08:34), Max: 37.2 (2019 08:34)  T(F): 98.9 (2019 08:34), Max: 98.9 (2019 08:34)  HR: 61 (2019 08:34) (61 - 66)  BP: 141/73 (2019 08:34) (130/85 - 141/73)  BP(mean): --  RR: 18 (2019 08:34) (12 - 18)  SpO2: 100% (2019 23:43) (100% - 100%)      PHYSICAL EXAM:  General: Well developed; well nourished; sleepy, respond to verbal commands and follows simple commands  Eyes: PERRLA, EOMI; conjunctiva and sclera clear  Respiratory: crackles in the right lower lung field  Cardiovascular: Regular rate and rhythm. S1 and S2 Normal;   Gastrointestinal: Soft non-tender non-distended; Normal bowel sounds  Genitourinary: No costovertebral angle tenderness  Extremities: Normal range of motion, No clubbing, cyanosis or edema  Vascular: Peripheral pulses palpable 2+ bilaterally  Neurological: Alert and oriented x1            LABS:                        10.2   7.6   )-----------( 150      ( 2019 17:25 )             32.1     04-    140  |  106  |  85.0<H>  ----------------------------<  132<H>  4.8   |  15.0<L>  |  3.95<H>    Ca    8.4<L>      2019 08:11    TPro  6.2<L>  /  Alb  2.8<L>  /  TBili  0.5  /  DBili  x   /  AST  538<H>  /  ALT  802<H>  /  AlkPhos  254<H>        Urinalysis Basic - ( 2019 09:53 )    Color: Yellow / Appearance: Slightly Turbid / S.015 / pH: x  Gluc: x / Ketone: Trace  / Bili: Negative / Urobili: 1 mg/dL   Blood: x / Protein: 500 mg/dL / Nitrite: Negative   Leuk Esterase: Moderate / RBC: 6-10 /HPF / WBC >50   Sq Epi: x / Non Sq Epi: Negative / Bacteria: Moderate          MEDICATIONS  (STANDING):  aspirin enteric coated 81 milliGRAM(s) Oral daily  atorvastatin 40 milliGRAM(s) Oral at bedtime  chlorhexidine 2% Cloths 1 Application(s) Topical daily  dextrose 5%. 1000 milliLiter(s) (50 mL/Hr) IV Continuous <Continuous>  dextrose 50% Injectable 12.5 Gram(s) IV Push once  dextrose 50% Injectable 25 Gram(s) IV Push once  dextrose 50% Injectable 25 Gram(s) IV Push once  gabapentin 100 milliGRAM(s) Oral three times a day  heparin  Injectable 5000 Unit(s) SubCutaneous every 8 hours  insulin lispro (HumaLOG) corrective regimen sliding scale   SubCutaneous Before meals and at bedtime  meropenem  IVPB 500 milliGRAM(s) IV Intermittent every 12 hours  metoprolol tartrate 25 milliGRAM(s) Oral three times a day  saccharomyces boulardii 250 milliGRAM(s) Oral two times a day  sodium chloride 0.9% lock flush 3 milliLiter(s) IV Push every 8 hours  tamsulosin 0.4 milliGRAM(s) Oral at bedtime    MEDICATIONS  (PRN):  acetaminophen   Tablet .. 650 milliGRAM(s) Oral every 6 hours PRN Temp greater or equal to 38C (100.4F)  dextrose 40% Gel 15 Gram(s) Oral once PRN Blood Glucose LESS THAN 70 milliGRAM(s)/deciliter  glucagon  Injectable 1 milliGRAM(s) IntraMuscular once PRN Glucose LESS THAN 70 milligrams/deciliter  ondansetron Injectable 4 milliGRAM(s) IV Push every 6 hours PRN Nausea      RADIOLOGY & ADDITIONAL TESTS:

## 2019-04-02 NOTE — CONSULT NOTE ADULT - SUBJECTIVE AND OBJECTIVE BOX
Vascular Attending:  Boo noguera      HPI:  86 y/o male sent from NH for above. 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 Patient was recently seen by Vascular for scheduled angiogram for ischemic non healing ulcer over left heel, however procedure was cancelled due to advanced CKD and risk of contrast nephropathy. Patient was at his usual baseline until over the course of the day he was noted to become more confused, coughing, and became hypoxic. He does have a hx of PNA as per family. He does not have any reported history of dysphagia on chart or by family. In the ED patient with RML infiltrate, fever, tachypnea, leukocytosis, and ruled in for sepsis. Lactate normal, MAP/SBP normal range. (29 Mar 2019 23:10)      Vascular Surgical HPI:    Admission HPI reviewed  85 year old with likely long standing tibial occlusive disease admitted for PNA treatment.   Patient known to our service, last seen   when patient was scheduled for ambulatory angiogram.    Procedure was canceled secondary to elevated creatine.  Poor historian, resides in NH. Activity level unclear    Currently No chest pain or sob       PAST MEDICAL & SURGICAL HISTORY:  Poor historian  Ulcer of foot: left heel  PVD (peripheral vascular disease)  Coronary heart disease  Chronic kidney disease  History of amputation of toe: every toe on left foot has been amputated  Pacemaker  PVD (peripheral vascular disease)  CRI (chronic renal insufficiency), stage 3 (moderate)  HTN (hypertension)  Diabetes  History of peripheral artery bypass: with vein graft and TMA in Aug 2017  History of amputation of toe: all toes on left foot have been amputated  Cardiac pacemaker: to right anterior chest wall      REVIEW OF SYSTEMS: see HPI         MEDICATIONS  (STANDING):  aspirin enteric coated 81 milliGRAM(s) Oral daily  atorvastatin 40 milliGRAM(s) Oral at bedtime  cadexomer iodine 0.9% Gel 1 Application(s) Topical daily  chlorhexidine 2% Cloths 1 Application(s) Topical daily  dextrose 5%. 1000 milliLiter(s) (50 mL/Hr) IV Continuous <Continuous>  dextrose 50% Injectable 12.5 Gram(s) IV Push once  dextrose 50% Injectable 25 Gram(s) IV Push once  dextrose 50% Injectable 25 Gram(s) IV Push once  heparin  Injectable 5000 Unit(s) SubCutaneous every 8 hours  insulin lispro (HumaLOG) corrective regimen sliding scale   SubCutaneous Before meals and at bedtime  meropenem  IVPB 500 milliGRAM(s) IV Intermittent every 12 hours  metoprolol tartrate 25 milliGRAM(s) Oral three times a day  saccharomyces boulardii 250 milliGRAM(s) Oral two times a day  sodium chloride 0.9% lock flush 3 milliLiter(s) IV Push every 8 hours  tamsulosin 0.4 milliGRAM(s) Oral at bedtime    MEDICATIONS  (PRN):  acetaminophen   Tablet .. 650 milliGRAM(s) Oral every 6 hours PRN Temp greater or equal to 38C (100.4F)  dextrose 40% Gel 15 Gram(s) Oral once PRN Blood Glucose LESS THAN 70 milliGRAM(s)/deciliter  glucagon  Injectable 1 milliGRAM(s) IntraMuscular once PRN Glucose LESS THAN 70 milligrams/deciliter  ondansetron Injectable 4 milliGRAM(s) IV Push every 6 hours PRN Nausea      Allergies    No Known Allergies    Intolerances        SOCIAL HISTORY: non contributory       Vital Signs Last 24 Hrs  T(C): 36.3 (2019 14:02), Max: 37.2 (2019 08:34)  T(F): 97.4 (2019 14:02), Max: 98.9 (2019 08:34)  HR: 70 (2019 14:02) (61 - 70)  BP: 140/78 (2019 14:02) (130/85 - 141/73)  BP(mean): --  RR: 18 (2019 14:02) (12 - 18)  SpO2: 100% (2019 23:43) (100% - 100%)    PHYSICAL EXAM:      Constitutional: no distress    Eyes: no jaundice     ENMT: atraumatic     Neck: no audible bruit     Respiratory: non labored breathing     Cardiovascular: s1/s2, pacemaker to right chest wall     Gastrointestinal: soft, non tender, no pulsation       Extremities: warm, lower ext flexed at the knees, Left tma , healed surgical margins. left lateral healed ulceration painted with betadine.  no noted active drainage     Vascular: palpable femorals, non palpable pedals     Neurological: no gross motor/ sensory deficits           Pulses:   Right:                                                                          Left:  FEM [x ]2+ [ ]1+ [ ]doppler                                             FEM [x ]2+ [ ]1+ [ ]doppler    POP [ ]2+ [ ]1+ [ ]doppler                                             POP [ ]2+ [ ]1+ [ ]doppler    DP [ ]2+ [ ]1+ [ ]doppler                                                DP [ ]2+ [ ]1+ [ ]doppler  PT[ ]2+ [ ]1+ [ ]doppler                                                  PT [ ]2+ [ ]1+ [ ]doppler      LABS:                        10.2   7.6   )-----------( 150      ( 2019 17:25 )             32.1     04-    140  |  106  |  85.0<H>  ----------------------------<  132<H>  4.8   |  15.0<L>  |  3.95<H>    Ca    8.4<L>      2019 08:11    TPro  6.2<L>  /  Alb  2.8<L>  /  TBili  0.5  /  DBili  x   /  AST  538<H>  /  ALT  802<H>  /  AlkPhos  254<H>        Urinalysis Basic - ( 2019 09:53 )    Color: Yellow / Appearance: Slightly Turbid / S.015 / pH: x  Gluc: x / Ketone: Trace  / Bili: Negative / Urobili: 1 mg/dL   Blood: x / Protein: 500 mg/dL / Nitrite: Negative   Leuk Esterase: Moderate / RBC: 6-10 /HPF / WBC >50   Sq Epi: x / Non Sq Epi: Negative / Bacteria: Moderate        RADIOLOGY & ADDITIONAL STUDIES        Impression and Plan:    Patient with known chronic perfusion deficits to the lower extremities  Left heel pressure point ulceration with likely underlying osteomyelitis   Progressing CKD    - Will follow the patient while in house  - if renal fxn improves and patient is cleared by renal service, diagnostic angiogram maybe reconsidered   - will report above to Dr. Dunen

## 2019-04-02 NOTE — CONSULT NOTE ADULT - SUBJECTIVE AND OBJECTIVE BOX
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  Prior to admission, the patient was recently seen by vascular surgery for planned angiogram for ischemic, non-healing ulcer over left heel, however, the procedure was cancelled due to his advanced CKD and concern of contrast-induced nephropathy.  On the day or presentation, he was reportedly at his usual baseline, but as the day progressed, he was noted to become more confused, started coughing, and became hypoxic.  Upon presentation to the ED, he was found to have a RML infiltrate, fever, tachypnea, leukocytosis, and met sepsis criteria.  He was started on levofloxacin but was also found to have a UTI, and because of his history of ESBL, ID was consulted and recommended changing antibiosis to meropenum.  On 3/31, his aminotransferases were noted to be significantly elevated and worsened yesterday with an AST/ALT of 1322/1067 with an ALP of 301.  His total bilirubin was not elevated at 0.4.    ROS: A 14-point review of systems was completed and was otherwise negative save what was reported in the HPI.    PAST MEDICAL/SURGICAL HISTORY:  Poor historian  Ulcer of foot: left heel  PVD (peripheral vascular disease)  Coronary heart disease  Chronic kidney disease  History of amputation of toe: every toe on left foot has been amputated  Pacemaker  PVD (peripheral vascular disease)  CRI (chronic renal insufficiency), stage 3 (moderate)  HTN (hypertension)  Diabetes  History of peripheral artery bypass: with vein graft and TMA in Aug 2017  History of amputation of toe: all toes on left foot have been amputated  Cardiac pacemaker: to right anterior chest wall    SOCIAL HISTORY:  - TOBACCO: Denies  - ALCOHOL: Denies  - ILLICIT DRUG USE: Denies    FAMILY HISTORY:  No known history of gastrointestinal or liver disease;  No pertinent family history in first degree relatives    HOME MEDICATIONS:  acetaminophen 325 mg oral tablet: 2 tab(s) orally every 4 hours, As Needed (15 Mar 2019 09:37)  aspirin 81 mg oral tablet: 1 tab(s) orally once a day (15 Mar 2019 09:34)  atorvastatin 40 mg oral tablet: 1 tab(s) orally once a day (at bedtime) (2017 10:28)  insulin glargine: 10 unit(s) subcutaneous once a day (at bedtime) (2017 11:50)  lactobacillus acidophilus oral capsule: 1 cap(s) orally once a day (2017 11:50)  metoprolol tartrate 25 mg oral tablet: 1 tab(s) orally 3 times a day (29 Mar 2019 23:32)  Neurontin 100 mg oral capsule: 1 cap(s) orally 3 times a day (29 Mar 2019 23:31)  pantoprazole 40 mg oral delayed release tablet: 1 tab(s) orally once a day (before a meal) (2017 10:28)  tamsulosin 0.4 mg oral capsule: 1 cap(s) orally once a day (at bedtime) (2017 10:28)    INPATIENT MEDICATIONS:  MEDICATIONS  (STANDING):  aspirin enteric coated 81 milliGRAM(s) Oral daily  atorvastatin 40 milliGRAM(s) Oral at bedtime  chlorhexidine 2% Cloths 1 Application(s) Topical daily  dextrose 5%. 1000 milliLiter(s) (50 mL/Hr) IV Continuous <Continuous>  dextrose 50% Injectable 12.5 Gram(s) IV Push once  dextrose 50% Injectable 25 Gram(s) IV Push once  dextrose 50% Injectable 25 Gram(s) IV Push once  gabapentin 100 milliGRAM(s) Oral three times a day  heparin  Injectable 5000 Unit(s) SubCutaneous every 8 hours  insulin lispro (HumaLOG) corrective regimen sliding scale   SubCutaneous Before meals and at bedtime  meropenem  IVPB 500 milliGRAM(s) IV Intermittent every 12 hours  metoprolol tartrate 25 milliGRAM(s) Oral three times a day  saccharomyces boulardii 250 milliGRAM(s) Oral two times a day  sodium chloride 0.9% lock flush 3 milliLiter(s) IV Push every 8 hours  tamsulosin 0.4 milliGRAM(s) Oral at bedtime    MEDICATIONS  (PRN):  acetaminophen   Tablet .. 650 milliGRAM(s) Oral every 6 hours PRN Temp greater or equal to 38C (100.4F)  dextrose 40% Gel 15 Gram(s) Oral once PRN Blood Glucose LESS THAN 70 milliGRAM(s)/deciliter  glucagon  Injectable 1 milliGRAM(s) IntraMuscular once PRN Glucose LESS THAN 70 milligrams/deciliter  ondansetron Injectable 4 milliGRAM(s) IV Push every 6 hours PRN Nausea    ALLERGIES:  No Known Allergies    VITAL SIGNS LAST 24 HOURS:  T(C): 36.4 (2019 23:43), Max: 36.8 (2019 15:55)  T(F): 97.5 (2019 23:43), Max: 98.3 (2019 15:55)  HR: 64 (2019 23:43) (64 - 75)  BP: 130/85 (2019 23:43) (130/85 - 142/84)  RR: 12 (2019 23:43) (12 - 19)  SpO2: 100% (2019 23:43) (100% - 100%)    PHYSICAL EXAM:  Constitutional: Well-developed, well-nourished, in no apparent distress  Eyes: Sclerae anicteric but injected, conjunctivae normal  ENMT: Mucus membranes moist, no oropharyngeal thrush noted  Neck: No thyroid nodules appreciated, no significant cervical or supraclavicular lymphadenopathy; elevated JVP  Respiratory: Breathing nonlabored; diffuse rhonchi  Cardiovascular: Regular rate and rhythm, holosystolic murmur  Gastrointestinal: Soft, nontender, nondistended, normoactive bowel sounds; no hepatosplenomegaly appreciated; no rebound tenderness or involuntary guarding  Extremities: No clubbing, cyanosis or edema  Neurological: Alert and oriented to person, place and time; no asterixis  Skin: No jaundice  Lymph Nodes: No significant lymphadenopathy  Musculoskeletal: No significant peripheral atrophy  Psychiatric: Affect and mood appropriate      LABS:                        10.2   7.6   )-----------( 150      ( 2019 17:25 )             32.1           141  |  108<H>  |  80.0<H>  ----------------------------<  177<H>  5.2   |  16.0<L>  |  3.68<H>    Ca    8.5<L>      2019 17:25    TPro  6.8  /  Alb  2.8<L>  /  TBili  0.4  /  DBili  x   /  AST  1322<H>  /  ALT  1067<H>  /  AlkPhos  301<H>      Urinalysis Basic - ( 2019 09:53 )    Color: Yellow / Appearance: Slightly Turbid / S.015 / pH: x  Gluc: x / Ketone: Trace  / Bili: Negative / Urobili: 1 mg/dL   Blood: x / Protein: 500 mg/dL / Nitrite: Negative   Leuk Esterase: Moderate / RBC: 6-10 /HPF / WBC >50   Sq Epi: x / Non Sq Epi: Negative / Bacteria: Moderate    IMAGING: I personally reviewed the CXR, and I agree with the radiologist's interpretation as described below:  < from: Xray Chest 1 View-PORTABLE IMMEDIATE (19 @ 20:56) >  TECHNIQUE:  Single frontal view of the chest was obtained.    FINDINGS:  Prior study dated 2017 was available for review.    The lungs demonstrate increased pulmonary vascular congestion compatible   congestive heart failure. Small bilateral pleural effusions are noted.    The heart is difficult to evaluate. Pacemaker is noted.           IMPRESSION: Findings suggestive of congestive heart failure. Small   bilateral pleural effusions are noted.   Pacemaker present.     < end of copied text >

## 2019-04-02 NOTE — CONSULT NOTE ADULT - ASSESSMENT
CRYSTAL on CKD   Had h/o DM will check A1C  Suspect some degree of progression of dz  + proteinuria on UA will check 24 hr urine to quantify   MA 2/2 CKD CRYSTAL on CKD   Renal sono 3/30 notes no hydronephrosis  Has h/o CAD, HTN, HLD, DM-2, ICM with EF of 35%  Will check HgA1C   Suspect some degree of hypoperfusion and progression of dz  will check protein cr ratio   MA 2/2 CKD will give po NaBicarb  Will give trial of IVF gentle  AM labs     Will follow

## 2019-04-02 NOTE — CONSULT NOTE ADULT - ASSESSMENT
Briefly, this is a chronically ill 85-year-old man with fairly severe cardiovascular disease who presented to the ED with sepsis due to HCAP and UTI, currently being treated with meropenum, and most recently found to have grossly elevated liver enzymes, prompting GI consultation.  Given his normal aminotransferases with only a mildly elevated alkaline phosphatase at his initial presentation, I highly doubt any underlying intrinsic liver disease.  The differential diagnosis includes but is not limited to a drug-induced liver injury, passive congestion from her underlying systolic heart failure, and sepsis.  Based on his chest x-ray and exam, both of which are consistent with congestive heart failure, my suspicion is that this is most likely passive congestion.  Viral hepatitis panel has been sent.  I see little utility in commencing with a broad workup of chronic liver disease at this time.    Recommendations:  - Liver ultrasound with Dopplers  - Consider TTE  - Follow up viral hepatitis panel  - Daily liver chemistries with INR; would also recommend pro-BNP    Thank you for the consult.  Please do not hesitate to call with any questions or concerns.    DIAMANTE Cavazos MD  [facility]  Division of Gastroenterology  Tel [phone]  Fax [phone]  04-02-19 @ 08:21

## 2019-04-02 NOTE — PROGRESS NOTE ADULT - SUBJECTIVE AND OBJECTIVE BOX
[facility]  INFECTIOUS DISEASES AND INTERNAL MEDICINE at [place]  =======================================================  Selvin El MD  Diplomates American Board of Internal Medicine and Infectious Diseases  =======================================================    [id number]  JUAN MIGUEL PARADA     Follow up: Fever and altered mental status    Afebrile now, seems more alert and awake, complaining of pain in lower legs.     PAST MEDICAL & SURGICAL HISTORY:  Poor historian  Ulcer of foot: left heel  PVD (peripheral vascular disease)  Coronary heart disease  Chronic kidney disease  History of amputation of toe: every toe on left foot has been amputated  Pacemaker  PVD (peripheral vascular disease)  CRI (chronic renal insufficiency), stage 3 (moderate)  HTN (hypertension)  Diabetes  History of peripheral artery bypass: with vein graft and TMA in Aug 2017  History of amputation of toe: all toes on left foot have been amputated  Cardiac pacemaker: to right anterior chest wall    FAMILY HISTORY:  No pertinent family history in first degree relatives    Allergies  No Known Allergies    Antibiotics:  levoFLOXacin  Tablet 500 milliGRAM(s) Oral every 48 hours     REVIEW OF SYSTEMS:  as above     Physical Exam:  Vital Signs Last 24 Hrs  T(C): 37.2 (2019 08:34), Max: 37.2 (2019 08:34)  T(F): 98.9 (2019 08:34), Max: 98.9 (2019 08:34)  HR: 61 (2019 08:34) (61 - 66)  BP: 141/73 (2019 08:34) (130/85 - 141/73)  BP(mean): --  RR: 18 (2019 08:34) (12 - 18)  SpO2: 100% (2019 23:43) (100% - 100%)  GEN: NAD, lethargic but opens his eyes and answers questions (just received pain medication?)  HEENT: normocephalic and atraumatic. EOMI. PERRL.    NECK: Supple.  No lymphadenopathy   LUNGS: Clear to auscultation.  HEART: Regular rate and rhythm without murmur.  ABDOMEN: Soft, nontender, and nondistended.  Positive bowel sounds.    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   PSYCHIATRIC: Appropriate affect .  SKIN: as above.     Labs:      140  |  106  |  85.0<H>  ----------------------------<  132<H>  4.8   |  15.0<L>  |  3.95<H>    Ca    8.4<L>      2019 08:11    TPro  6.2<L>  /  Alb  2.8<L>  /  TBili  0.5  /  DBili  x   /  AST  538<H>  /  ALT  802<H>  /  AlkPhos  254<H>                          10.2   7.6   )-----------( 150      ( 2019 17:25 )             32.1     Urinalysis Basic - ( 2019 09:53 )    Color: Yellow / Appearance: Slightly Turbid / S.015 / pH: x  Gluc: x / Ketone: Trace  / Bili: Negative / Urobili: 1 mg/dL   Blood: x / Protein: 500 mg/dL / Nitrite: Negative   Leuk Esterase: Moderate / RBC: 6-10 /HPF / WBC >50   Sq Epi: x / Non Sq Epi: Negative / Bacteria: Moderate    LIVER FUNCTIONS - ( 2019 08:11 )  Alb: 2.8 g/dL / Pro: 6.2 g/dL / ALK PHOS: 254 U/L / ALT: 802 U/L / AST: 538 U/L / GGT: x           RECENT CULTURES:   @ 08:52        NotDetec   @ 20:55 .Blood     No growth at 48 hours    All imaging and other data have been reviewed

## 2019-04-02 NOTE — PROGRESS NOTE ADULT - ASSESSMENT
Assessment and Plan:   85 y/o male with history of MCI/early dementia, severe RLE PVD with chronic non-healing heel ulcer, CAD, HTN, HLD, DM-2, ICM with EF of 35% presents to the hospital with sepsis from HCAP. Blood culture been negative. Saturating normal at room air. Course complicated by elevated bun/cr, transaminitis, UTI with hx of esbl uti in past. Patient is DNR/DNI. PT recommended KYLEE placement    Metabolic encephalopathy:  secondary to UTI, Pneumonia, Transaminitis elevated bun/cr  Ammonia normal   US abdomen showed cholelithiasis with normal cbd  continue antibiotics.  f/u cultures    UTI:  Urine culture in process  Meropenem day 2 with probiotics  patient has hx of esbl ecoli before      Complicated pneumonia:  HCAP (healthcare-associated pneumonia); likely gram negative pneumonia.    - Based on living in LTC facility and sepsis with cover for HCAP gram neg/pos, MRSA and anaerobes   -Blood culture been negative  -Spiromter, OOB, DVT-P.  - patient saturating well on room air at this time and does not need O2  -was initially on levofloxacin but ab changed to meropenem day 2    TRANSAMINITIS:  could be due to medication related or infection.  US abdomen showed cholelithiasis but no evidence of cholecystitis, with normal cbd  trend lft  check inr, ptt  GI note appreciated  Hepatitis profile in process  ABD is benign      Acute Hypoxic respiratory failure; due to PNA. Improved  - cont. plan as above,     PVD (peripheral vascular disease).    - Based on previous documentation and family at bedside ulcers are about the same, no drainage, foul smell, or surrounding erythema to suggest this is source of infection.   - Will cont. with local wound care and off loading boots.   - Wound care and podiatry consulted   - Cont. ASA, statin.   WILL consult wound care nurse    Type 2 diabetes mellitus with diabetic peripheral angiopathy without gangrene, with long-term current use of insulin.    - ADA diet, HISS, Accu checks AC/HS.     Arterial Ulcer of left heel, unspecified ulcer stage.   - plan as above. Family states patient didn't want any aggressive care for foot/ulcer.    Elevated bun/cr:  HAS CKD stage 4  Nephrology consulted   avoid nephrotoxic drugs.   US renal showed no evidence of renal stone and hydronephrosis    Hypertension, unspecified type.  Plan: Cont. o/p regimen, dash diet.     DVT prophylaxis:  Heparin q 8 h        Plan discussed with patient daughter Chanda. She mentioned that she will leave  for her country next week. Her sister Jessika [phone] to be .

## 2019-04-02 NOTE — CONSULT NOTE ADULT - SUBJECTIVE AND OBJECTIVE BOX
HPI:  86 y/o male sent from NH for above. 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 Patient was recently seen by Vascular for scheduled angiogram for ischemic non healing ulcer over left heel, however procedure was cancelled due to advanced CKD and risk of contrast nephropathy. Patient was at his usual baseline until over the course of the day he was noted to become more confused, coughing, and became hypoxic. He does have a hx of PNA as per family. He does not have any reported history of dysphagia on chart or by family. In the ED patient with RML infiltrate, fever, tachypnea, leukocytosis, and ruled in for sepsis.   Cr 3.9 was 3 on 3/30/19 cr was 2 on 2017      PAST MEDICAL & SURGICAL HISTORY:  Poor historian  Ulcer of foot: left heel  PVD (peripheral vascular disease)  Coronary heart disease  Chronic kidney disease  History of amputation of toe: every toe on left foot has been amputated  Pacemaker  PVD (peripheral vascular disease)  CRI (chronic renal insufficiency), stage 3 (moderate)  HTN (hypertension)  Diabetes  History of peripheral artery bypass: with vein graft and TMA in Aug 2017  History of amputation of toe: all toes on left foot have been amputated  Cardiac pacemaker: to right anterior chest wall      FAMILY HISTORY:  No pertinent family history in first degree relatives  [place]    Social History:Non smoker    MEDICATIONS  (STANDING):  aspirin enteric coated 81 milliGRAM(s) Oral daily  atorvastatin 40 milliGRAM(s) Oral at bedtime  chlorhexidine 2% Cloths 1 Application(s) Topical daily  dextrose 5%. 1000 milliLiter(s) (50 mL/Hr) IV Continuous <Continuous>  dextrose 50% Injectable 12.5 Gram(s) IV Push once  dextrose 50% Injectable 25 Gram(s) IV Push once  dextrose 50% Injectable 25 Gram(s) IV Push once  heparin  Injectable 5000 Unit(s) SubCutaneous every 8 hours  insulin lispro (HumaLOG) corrective regimen sliding scale   SubCutaneous Before meals and at bedtime  meropenem  IVPB 500 milliGRAM(s) IV Intermittent every 12 hours  metoprolol tartrate 25 milliGRAM(s) Oral three times a day  saccharomyces boulardii 250 milliGRAM(s) Oral two times a day  sodium chloride 0.9% lock flush 3 milliLiter(s) IV Push every 8 hours  tamsulosin 0.4 milliGRAM(s) Oral at bedtime    MEDICATIONS  (PRN):  acetaminophen   Tablet .. 650 milliGRAM(s) Oral every 6 hours PRN Temp greater or equal to 38C (100.4F)  dextrose 40% Gel 15 Gram(s) Oral once PRN Blood Glucose LESS THAN 70 milliGRAM(s)/deciliter  glucagon  Injectable 1 milliGRAM(s) IntraMuscular once PRN Glucose LESS THAN 70 milligrams/deciliter  ondansetron Injectable 4 milliGRAM(s) IV Push every 6 hours PRN Nausea   Meds reviewed    Allergies    No Known Allergies        Vital Signs Last 24 Hrs  T(C): 37.2 (2019 08:34), Max: 37.2 (2019 08:34)  T(F): 98.9 (2019 08:34), Max: 98.9 (2019 08:34)  HR: 61 (2019 08:34) (61 - 66)  BP: 141/73 (2019 08:34) (130/85 - 141/73)  BP(mean): --  RR: 18 (2019 08:34) (12 - 18)  SpO2: 100% (2019 23:43) (100% - 100%)  Daily     Daily     PHYSICAL EXAM:    GENERAL: appears chronically ill  HEAD:  NCAT  EYES: EOMI  NECK: Supple, neck  veins full  NERVOUS SYSTEM:  Alert & Oriented X3  CHEST/LUNG: Clear to percussion bilaterally; No rales  HEART: Regular rate and rhythm; No murmurs  ABDOMEN: Soft, Nontender, Nondistended; Bowel sounds present  EXTREMITIES:  +edema      LABS:                        10.2   7.6   )-----------( 150      ( 2019 17:25 )             32.1     04-02    140  |  106  |  85.0<H>  ----------------------------<  132<H>  4.8   |  15.0<L>  |  3.95<H>    Ca    8.4<L>      2019 08:11    TPro  6.2<L>  /  Alb  2.8<L>  /  TBili  0.5  /  DBili  x   /  AST  538<H>  /  ALT  802<H>  /  AlkPhos  254<H>  04-      Urinalysis Basic - ( 2019 09:53 )    Color: Yellow / Appearance: Slightly Turbid / S.015 / pH: x  Gluc: x / Ketone: Trace  / Bili: Negative / Urobili: 1 mg/dL   Blood: x / Protein: 500 mg/dL / Nitrite: Negative   Leuk Esterase: Moderate / RBC: 6-10 /HPF / WBC >50   Sq Epi: x / Non Sq Epi: Negative / Bacteria: Moderate              RADIOLOGY & ADDITIONAL TESTS: HPI:  84 y/o male sent from NH for above. 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 Patient was recently seen by Vascular for scheduled angiogram for ischemic non healing ulcer over left heel, however procedure was cancelled due to advanced CKD and risk of contrast nephropathy. Patient was at his usual baseline until over the course of the day he was noted to become more confused, coughing, and became hypoxic. He does have a hx of PNA as per family. He does not have any reported history of dysphagia on chart or by family. In the ED patient with RML infiltrate, fever, tachypnea, leukocytosis, and ruled in for sepsis.   Cr 3.9 was 3 on 3/30/19 cr was 2 on 2017      PAST MEDICAL & SURGICAL HISTORY:  Poor historian  Ulcer of foot: left heel  PVD (peripheral vascular disease)  Coronary heart disease  Chronic kidney disease  History of amputation of toe: every toe on left foot has been amputated  Pacemaker  PVD (peripheral vascular disease)  CRI (chronic renal insufficiency), stage 3 (moderate)  HTN (hypertension)  Diabetes  History of peripheral artery bypass: with vein graft and TMA in Aug 2017  History of amputation of toe: all toes on left foot have been amputated  Cardiac pacemaker: to right anterior chest wall      FAMILY HISTORY:  No pertinent family history in first degree relatives  [place]    Social History:Non smoker    MEDICATIONS  (STANDING):  aspirin enteric coated 81 milliGRAM(s) Oral daily  atorvastatin 40 milliGRAM(s) Oral at bedtime  chlorhexidine 2% Cloths 1 Application(s) Topical daily  dextrose 5%. 1000 milliLiter(s) (50 mL/Hr) IV Continuous <Continuous>  dextrose 50% Injectable 12.5 Gram(s) IV Push once  dextrose 50% Injectable 25 Gram(s) IV Push once  dextrose 50% Injectable 25 Gram(s) IV Push once  heparin  Injectable 5000 Unit(s) SubCutaneous every 8 hours  insulin lispro (HumaLOG) corrective regimen sliding scale   SubCutaneous Before meals and at bedtime  meropenem  IVPB 500 milliGRAM(s) IV Intermittent every 12 hours  metoprolol tartrate 25 milliGRAM(s) Oral three times a day  saccharomyces boulardii 250 milliGRAM(s) Oral two times a day  sodium chloride 0.9% lock flush 3 milliLiter(s) IV Push every 8 hours  tamsulosin 0.4 milliGRAM(s) Oral at bedtime    MEDICATIONS  (PRN):  acetaminophen   Tablet .. 650 milliGRAM(s) Oral every 6 hours PRN Temp greater or equal to 38C (100.4F)  dextrose 40% Gel 15 Gram(s) Oral once PRN Blood Glucose LESS THAN 70 milliGRAM(s)/deciliter  glucagon  Injectable 1 milliGRAM(s) IntraMuscular once PRN Glucose LESS THAN 70 milligrams/deciliter  ondansetron Injectable 4 milliGRAM(s) IV Push every 6 hours PRN Nausea   Meds reviewed    Allergies    No Known Allergies        Vital Signs Last 24 Hrs  T(C): 37.2 (2019 08:34), Max: 37.2 (2019 08:34)  T(F): 98.9 (2019 08:34), Max: 98.9 (2019 08:34)  HR: 61 (2019 08:34) (61 - 66)  BP: 141/73 (2019 08:34) (130/85 - 141/73)  BP(mean): --  RR: 18 (2019 08:34) (12 - 18)  SpO2: 100% (2019 23:43) (100% - 100%)  Daily     Daily     PHYSICAL EXAM:    GENERAL: appears chronically ill  HEAD:  NCAT, dry mm  EYES: EOMI  NECK: Supple, neck  veins full  NERVOUS SYSTEM:  Alert & Oriented X3  CHEST/LUNG: Clear to percussion bilaterally; No rales  HEART: Regular rate and rhythm; No murmurs  ABDOMEN: Soft, Nontender, Nondistended; Bowel sounds present  EXTREMITIES:  no edema      LABS:                        10.2   7.6   )-----------( 150      ( 2019 17:25 )             32.1     04-02    140  |  106  |  85.0<H>  ----------------------------<  132<H>  4.8   |  15.0<L>  |  3.95<H>    Ca    8.4<L>      2019 08:11    TPro  6.2<L>  /  Alb  2.8<L>  /  TBili  0.5  /  DBili  x   /  AST  538<H>  /  ALT  802<H>  /  AlkPhos  254<H>  04-      Urinalysis Basic - ( 2019 09:53 )    Color: Yellow / Appearance: Slightly Turbid / S.015 / pH: x  Gluc: x / Ketone: Trace  / Bili: Negative / Urobili: 1 mg/dL   Blood: x / Protein: 500 mg/dL / Nitrite: Negative   Leuk Esterase: Moderate / RBC: 6-10 /HPF / WBC >50   Sq Epi: x / Non Sq Epi: Negative / Bacteria: Moderate              RADIOLOGY & ADDITIONAL TESTS:

## 2019-04-02 NOTE — PROGRESS NOTE ADULT - SUBJECTIVE AND OBJECTIVE BOX
86 yo male with PMHX of DM, PVD, CRI, pacemaker seen at bedside for left foot stable TMA ulcer. Pt is NAD and AAOX 3. Patient is a Latvian speaker and poor historian. Pt is admitted for generalized abdominal pain and diarrhea. PT denies N/V/C/F. Pt is diabetic.         PAST MEDICAL & SURGICAL HISTORY:  Pacemaker  PVD (peripheral vascular disease)  CRI (chronic renal insufficiency), stage 3 (moderate)  HTN (hypertension)  Diabetes  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      ALLERGIES: No Known Allergies      MEDS:  acetaminophen   Tablet. 650 milliGRAM(s) Oral every 6 hours PRN  amLODIPine   Tablet 10 milliGRAM(s) Oral daily  aspirin 325 milliGRAM(s) Oral daily  atorvastatin 40 milliGRAM(s) Oral at bedtime  dextrose 5%. 1000 milliLiter(s) IV Continuous <Continuous>  dextrose 50% Injectable 12.5 Gram(s) IV Push once  dextrose 50% Injectable 25 Gram(s) IV Push once  dextrose 50% Injectable 25 Gram(s) IV Push once  dextrose Gel 1 Dose(s) Oral once PRN  enoxaparin Injectable 30 milliGRAM(s) SubCutaneous daily  glucagon  Injectable 1 milliGRAM(s) IntraMuscular once PRN  hydrALAZINE Injectable 10 milliGRAM(s) IV Push every 4 hours PRN  HYDROcodone/homatropine Syrup 5 milliLiter(s) Oral every 6 hours PRN  insulin glargine Injectable (LANTUS) 7 Unit(s) SubCutaneous at bedtime  insulin lispro (HumaLOG) corrective regimen sliding scale   SubCutaneous three times a day before meals  lactobacillus acidophilus 1 Tablet(s) Oral two times a day with meals  metoprolol     tartrate 50 milliGRAM(s) Oral two times a day  nystatin/triamcinolone Cream 1 Application(s) Topical two times a day  pantoprazole    Tablet 40 milliGRAM(s) Oral before breakfast  tamsulosin 0.4 milliGRAM(s) Oral at bedtime  vancomycin    Solution 125 milliGRAM(s) Oral every 6 hours      SOCIAL HISTORY:  Smoker:      FAMILY HISTORY:  No pertinent family history in first degree relatives        PE: Left Foot   Vascular: DP/PT: non palpable; CFT< none; TG: wnl; no edema noted  Derm: stable fibrotic ulceration noted on the lateral aspect of Left foot  heel no serous drainage noted; no pus noted; stable wound; no clinical sign of infection; no erythema; no mal odor  NEuro: Unable to assess     A: Left Foot TMA with Stable Lateral Ulceration at stump site     P:  Patient evalauted and chart reviewed  Xray ordered l foot  AWILDA ordered  Recommend Vascular and ID consult.  Continue IV abx as per ID recommendation.  Excisional debridement of the lateral stump ulceration using # 15 blade to subcutaneous tissue level. Pt tolerated procedure well  DSD applied  Keep dressing clean, dry and intact to the left foot   Podiatry will follow patient in house    Wound Care Dressing Orders  1. Remove old dressing  2. Apply gauze  3. Wrap with kerlix  4. Keep dressing clean, dry, and intact to the left foot  5. Follow up with Dr. Jain as outpatient for further wound care. 86 yo male with PMHX of DM, PVD, CRI, pacemaker seen at bedside for left foot stable TMA ulcer. Pt is NAD and AAOX 3. Patient is a Swedish speaker and poor historian. Pt is admitted for generalized abdominal pain and diarrhea. PT denies N/V/C/F. Pt is diabetic.         PAST MEDICAL & SURGICAL HISTORY:  Pacemaker  PVD (peripheral vascular disease)  CRI (chronic renal insufficiency), stage 3 (moderate)  HTN (hypertension)  Diabetes  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      ALLERGIES: No Known Allergies      MEDS:  acetaminophen   Tablet. 650 milliGRAM(s) Oral every 6 hours PRN  amLODIPine   Tablet 10 milliGRAM(s) Oral daily  aspirin 325 milliGRAM(s) Oral daily  atorvastatin 40 milliGRAM(s) Oral at bedtime  dextrose 5%. 1000 milliLiter(s) IV Continuous <Continuous>  dextrose 50% Injectable 12.5 Gram(s) IV Push once  dextrose 50% Injectable 25 Gram(s) IV Push once  dextrose 50% Injectable 25 Gram(s) IV Push once  dextrose Gel 1 Dose(s) Oral once PRN  enoxaparin Injectable 30 milliGRAM(s) SubCutaneous daily  glucagon  Injectable 1 milliGRAM(s) IntraMuscular once PRN  hydrALAZINE Injectable 10 milliGRAM(s) IV Push every 4 hours PRN  HYDROcodone/homatropine Syrup 5 milliLiter(s) Oral every 6 hours PRN  insulin glargine Injectable (LANTUS) 7 Unit(s) SubCutaneous at bedtime  insulin lispro (HumaLOG) corrective regimen sliding scale   SubCutaneous three times a day before meals  lactobacillus acidophilus 1 Tablet(s) Oral two times a day with meals  metoprolol     tartrate 50 milliGRAM(s) Oral two times a day  nystatin/triamcinolone Cream 1 Application(s) Topical two times a day  pantoprazole    Tablet 40 milliGRAM(s) Oral before breakfast  tamsulosin 0.4 milliGRAM(s) Oral at bedtime  vancomycin    Solution 125 milliGRAM(s) Oral every 6 hours      SOCIAL HISTORY:  Smoker:      FAMILY HISTORY:  No pertinent family history in first degree relatives        PE: Left Foot   Vascular: DP/PT: non palpable; CFT< none; TG: wnl; no edema noted  Derm: stable fibrotic eschar  ulceration noted on the lateral aspect of Left foot  heel no serous drainage noted; no pus noted; stable wound; no clinical sign of infection; no erythema; no mal odor  NEuro: Unable to assess     A: Left Foot TMA with Stable Lateral Ulceration at stump site     P:  Patient evalauted and chart reviewed  Xray ordered l foot  AWILDA ordered  Recommend Vascular and ID consult.  Continue IV abx as per ID recommendation.  Excisional debridement of the lateral stump ulceration using # 15 blade to subcutaneous tissue level. Pt tolerated procedure well  DSD applied  Keep dressing clean, dry and intact to the left foot   Podiatry will follow patient in house    Wound Care Dressing Orders  1. Remove old dressing  2. Apply gauze  3. Wrap with kerlix  4. Keep dressing clean, dry, and intact to the left foot  5. Follow up with Dr. Jain as outpatient for further wound care.

## 2019-04-03 DIAGNOSIS — R74.8 ABNORMAL LEVELS OF OTHER SERUM ENZYMES: ICD-10-CM

## 2019-04-03 DIAGNOSIS — I25.10 ATHEROSCLEROTIC HEART DISEASE OF NATIVE CORONARY ARTERY WITHOUT ANGINA PECTORIS: ICD-10-CM

## 2019-04-03 DIAGNOSIS — I50.20 UNSPECIFIED SYSTOLIC (CONGESTIVE) HEART FAILURE: ICD-10-CM

## 2019-04-03 DIAGNOSIS — I10 ESSENTIAL (PRIMARY) HYPERTENSION: ICD-10-CM

## 2019-04-03 LAB
ALBUMIN SERPL ELPH-MCNC: 2.5 G/DL — LOW (ref 3.3–5.2)
ALP SERPL-CCNC: 207 U/L — HIGH (ref 40–120)
ALT FLD-CCNC: 435 U/L — HIGH
ANION GAP SERPL CALC-SCNC: 18 MMOL/L — HIGH (ref 5–17)
AST SERPL-CCNC: 168 U/L — HIGH
BASOPHILS # BLD AUTO: 0 K/UL — SIGNIFICANT CHANGE UP (ref 0–0.2)
BASOPHILS NFR BLD AUTO: 0.3 % — SIGNIFICANT CHANGE UP (ref 0–2)
BILIRUB DIRECT SERPL-MCNC: 0.2 MG/DL — SIGNIFICANT CHANGE UP (ref 0–0.3)
BILIRUB INDIRECT FLD-MCNC: 0.2 MG/DL — SIGNIFICANT CHANGE UP (ref 0.2–1)
BILIRUB SERPL-MCNC: 0.4 MG/DL — SIGNIFICANT CHANGE UP (ref 0.4–2)
BUN SERPL-MCNC: 88 MG/DL — HIGH (ref 8–20)
CALCIUM SERPL-MCNC: 7.8 MG/DL — LOW (ref 8.6–10.2)
CHLORIDE SERPL-SCNC: 106 MMOL/L — SIGNIFICANT CHANGE UP (ref 98–107)
CO2 SERPL-SCNC: 17 MMOL/L — LOW (ref 22–29)
CREAT SERPL-MCNC: 3.76 MG/DL — HIGH (ref 0.5–1.3)
CULTURE RESULTS: SIGNIFICANT CHANGE UP
EOSINOPHIL # BLD AUTO: 0.1 K/UL — SIGNIFICANT CHANGE UP (ref 0–0.5)
EOSINOPHIL NFR BLD AUTO: 1.2 % — SIGNIFICANT CHANGE UP (ref 0–5)
GLUCOSE BLDC GLUCOMTR-MCNC: 161 MG/DL — HIGH (ref 70–99)
GLUCOSE BLDC GLUCOMTR-MCNC: 181 MG/DL — HIGH (ref 70–99)
GLUCOSE BLDC GLUCOMTR-MCNC: 189 MG/DL — HIGH (ref 70–99)
GLUCOSE BLDC GLUCOMTR-MCNC: 201 MG/DL — HIGH (ref 70–99)
GLUCOSE SERPL-MCNC: 179 MG/DL — HIGH (ref 70–115)
HAV IGM SER-ACNC: SIGNIFICANT CHANGE UP
HBA1C BLD-MCNC: 7.5 % — HIGH (ref 4–5.6)
HBV CORE IGM SER-ACNC: SIGNIFICANT CHANGE UP
HBV SURFACE AG SER-ACNC: SIGNIFICANT CHANGE UP
HCT VFR BLD CALC: 28.4 % — LOW (ref 42–52)
HCV AB S/CO SERPL IA: 0.4 S/CO — SIGNIFICANT CHANGE UP (ref 0–0.79)
HCV AB SERPL-IMP: SIGNIFICANT CHANGE UP
HGB BLD-MCNC: 9.2 G/DL — LOW (ref 14–18)
INR BLD: 1.56 RATIO — HIGH (ref 0.88–1.16)
LYMPHOCYTES # BLD AUTO: 0.7 K/UL — LOW (ref 1–4.8)
LYMPHOCYTES # BLD AUTO: 10.4 % — LOW (ref 20–55)
MAGNESIUM SERPL-MCNC: 1.6 MG/DL — SIGNIFICANT CHANGE UP (ref 1.6–2.6)
MCHC RBC-ENTMCNC: 29.7 PG — SIGNIFICANT CHANGE UP (ref 27–31)
MCHC RBC-ENTMCNC: 32.4 G/DL — SIGNIFICANT CHANGE UP (ref 32–36)
MCV RBC AUTO: 91.6 FL — SIGNIFICANT CHANGE UP (ref 80–94)
MONOCYTES # BLD AUTO: 0.7 K/UL — SIGNIFICANT CHANGE UP (ref 0–0.8)
MONOCYTES NFR BLD AUTO: 10.9 % — HIGH (ref 3–10)
NEUTROPHILS # BLD AUTO: 5.2 K/UL — SIGNIFICANT CHANGE UP (ref 1.8–8)
NEUTROPHILS NFR BLD AUTO: 76.9 % — HIGH (ref 37–73)
NT-PROBNP SERPL-SCNC: HIGH PG/ML (ref 0–300)
PHOSPHATE SERPL-MCNC: 4 MG/DL — SIGNIFICANT CHANGE UP (ref 2.4–4.7)
PLATELET # BLD AUTO: 164 K/UL — SIGNIFICANT CHANGE UP (ref 150–400)
POTASSIUM SERPL-MCNC: 4.2 MMOL/L — SIGNIFICANT CHANGE UP (ref 3.5–5.3)
POTASSIUM SERPL-SCNC: 4.2 MMOL/L — SIGNIFICANT CHANGE UP (ref 3.5–5.3)
PROT SERPL-MCNC: 5.7 G/DL — LOW (ref 6.6–8.7)
PROTHROM AB SERPL-ACNC: 18.2 SEC — HIGH (ref 10–12.9)
RBC # BLD: 3.1 M/UL — LOW (ref 4.6–6.2)
RBC # FLD: 15.9 % — HIGH (ref 11–15.6)
SODIUM SERPL-SCNC: 141 MMOL/L — SIGNIFICANT CHANGE UP (ref 135–145)
SPECIMEN SOURCE: SIGNIFICANT CHANGE UP
WBC # BLD: 6.7 K/UL — SIGNIFICANT CHANGE UP (ref 4.8–10.8)
WBC # FLD AUTO: 6.7 K/UL — SIGNIFICANT CHANGE UP (ref 4.8–10.8)

## 2019-04-03 PROCEDURE — 99233 SBSQ HOSP IP/OBS HIGH 50: CPT | Mod: GC

## 2019-04-03 PROCEDURE — 99233 SBSQ HOSP IP/OBS HIGH 50: CPT

## 2019-04-03 PROCEDURE — 99223 1ST HOSP IP/OBS HIGH 75: CPT

## 2019-04-03 RX ORDER — METOPROLOL TARTRATE 50 MG
50 TABLET ORAL DAILY
Qty: 0 | Refills: 0 | Status: DISCONTINUED | OUTPATIENT
Start: 2019-04-03 | End: 2019-04-05

## 2019-04-03 RX ORDER — SACUBITRIL AND VALSARTAN 24; 26 MG/1; MG/1
1 TABLET, FILM COATED ORAL
Qty: 0 | Refills: 0 | Status: DISCONTINUED | OUTPATIENT
Start: 2019-04-03 | End: 2019-04-04

## 2019-04-03 RX ADMIN — Medication 4: at 16:10

## 2019-04-03 RX ADMIN — Medication 650 MILLIGRAM(S): at 22:18

## 2019-04-03 RX ADMIN — Medication 650 MILLIGRAM(S): at 06:05

## 2019-04-03 RX ADMIN — TAMSULOSIN HYDROCHLORIDE 0.4 MILLIGRAM(S): 0.4 CAPSULE ORAL at 22:18

## 2019-04-03 RX ADMIN — Medication 650 MILLIGRAM(S): at 15:44

## 2019-04-03 RX ADMIN — Medication 250 MILLIGRAM(S): at 06:05

## 2019-04-03 RX ADMIN — ATORVASTATIN CALCIUM 40 MILLIGRAM(S): 80 TABLET, FILM COATED ORAL at 22:18

## 2019-04-03 RX ADMIN — HEPARIN SODIUM 5000 UNIT(S): 5000 INJECTION INTRAVENOUS; SUBCUTANEOUS at 14:09

## 2019-04-03 RX ADMIN — Medication 81 MILLIGRAM(S): at 12:13

## 2019-04-03 RX ADMIN — MEROPENEM 100 MILLIGRAM(S): 1 INJECTION INTRAVENOUS at 18:29

## 2019-04-03 RX ADMIN — Medication 1 APPLICATION(S): at 12:13

## 2019-04-03 RX ADMIN — HEPARIN SODIUM 5000 UNIT(S): 5000 INJECTION INTRAVENOUS; SUBCUTANEOUS at 06:05

## 2019-04-03 RX ADMIN — Medication 2: at 11:41

## 2019-04-03 RX ADMIN — SODIUM CHLORIDE 3 MILLILITER(S): 9 INJECTION INTRAMUSCULAR; INTRAVENOUS; SUBCUTANEOUS at 23:06

## 2019-04-03 RX ADMIN — Medication 250 MILLIGRAM(S): at 18:15

## 2019-04-03 RX ADMIN — HEPARIN SODIUM 5000 UNIT(S): 5000 INJECTION INTRAVENOUS; SUBCUTANEOUS at 22:18

## 2019-04-03 RX ADMIN — Medication 2: at 22:18

## 2019-04-03 RX ADMIN — Medication 25 MILLIGRAM(S): at 06:06

## 2019-04-03 RX ADMIN — SODIUM CHLORIDE 3 MILLILITER(S): 9 INJECTION INTRAMUSCULAR; INTRAVENOUS; SUBCUTANEOUS at 15:45

## 2019-04-03 RX ADMIN — SODIUM CHLORIDE 3 MILLILITER(S): 9 INJECTION INTRAMUSCULAR; INTRAVENOUS; SUBCUTANEOUS at 06:06

## 2019-04-03 RX ADMIN — Medication 2: at 07:45

## 2019-04-03 RX ADMIN — SODIUM CHLORIDE 83 MILLILITER(S): 9 INJECTION, SOLUTION INTRAVENOUS at 12:14

## 2019-04-03 RX ADMIN — SACUBITRIL AND VALSARTAN 1 TABLET(S): 24; 26 TABLET, FILM COATED ORAL at 18:15

## 2019-04-03 RX ADMIN — MEROPENEM 100 MILLIGRAM(S): 1 INJECTION INTRAVENOUS at 06:05

## 2019-04-03 RX ADMIN — CHLORHEXIDINE GLUCONATE 1 APPLICATION(S): 213 SOLUTION TOPICAL at 12:13

## 2019-04-03 NOTE — PROGRESS NOTE ADULT - ASSESSMENT
CRYSTAL on CKD ===> Baseline creat 0f 2.5 <--> 3.0   Severe CM with EF < 20   No hydro on renal imaging   Improving ischemic hepatitis ?   Watch on Entresto for now   Repeat CXR to reassess effusions   PAD with nonhealing heel ulcer   Dementia   PNA , neg urine Legionella   Given all issues , Palliative care evaluation is appropriate

## 2019-04-03 NOTE — PROGRESS NOTE ADULT - SUBJECTIVE AND OBJECTIVE BOX
Patient is a 85y old  Male who presents with a chief complaint of Fever, confusion, cough, Pneumonia (02 Apr 2019 15:47)      HPI:  84 y/o male sent from NH for above. 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  Patient denies abdominal pain. No fevers,         REVIEW OF SYSTEMS:  Constitutional: No fever, weight loss or fatigue  ENMT:  No difficulty hearing, tinnitus, vertigo; No sinus or throat pain  Respiratory: No cough, wheezing, chills or hemoptysis  Cardiovascular: No chest pain, palpitations, dizziness or leg swelling  Gastrointestinal: No abdominal or epigastric pain. No nausea, vomiting or hematemesis; No diarrhea or constipation. No melena or hematochezia.  Skin: No itching, burning, rashes or lesions   Musculoskeletal: No joint pain or swelling; No muscle, back or extremity pain    PAST MEDICAL & SURGICAL HISTORY:  Poor historian  Ulcer of foot: left heel  PVD (peripheral vascular disease)  Coronary heart disease  Chronic kidney disease  History of amputation of toe: every toe on left foot has been amputated  Pacemaker  PVD (peripheral vascular disease)  CRI (chronic renal insufficiency), stage 3 (moderate)  HTN (hypertension)  Diabetes  History of peripheral artery bypass: with vein graft and TMA in Aug 2017  History of amputation of toe: all toes on left foot have been amputated  Cardiac pacemaker: to right anterior chest wall      FAMILY HISTORY:  No pertinent family history in first degree relatives      SOCIAL HISTORY:  Smoking Status: [ ] Current, [ ] Former, [ ] Never  Pack Years:  [  ] EtOH-no  [  ] IVDA    MEDICATIONS:  MEDICATIONS  (STANDING):  aspirin enteric coated 81 milliGRAM(s) Oral daily  atorvastatin 40 milliGRAM(s) Oral at bedtime  cadexomer iodine 0.9% Gel 1 Application(s) Topical daily  chlorhexidine 2% Cloths 1 Application(s) Topical daily  dextrose 5%. 1000 milliLiter(s) (50 mL/Hr) IV Continuous <Continuous>  dextrose 50% Injectable 12.5 Gram(s) IV Push once  dextrose 50% Injectable 25 Gram(s) IV Push once  dextrose 50% Injectable 25 Gram(s) IV Push once  heparin  Injectable 5000 Unit(s) SubCutaneous every 8 hours  insulin lispro (HumaLOG) corrective regimen sliding scale   SubCutaneous Before meals and at bedtime  meropenem  IVPB 500 milliGRAM(s) IV Intermittent every 12 hours  metoprolol tartrate 25 milliGRAM(s) Oral three times a day  saccharomyces boulardii 250 milliGRAM(s) Oral two times a day  sodium bicarbonate 650 milliGRAM(s) Oral three times a day  sodium chloride 0.45% 1000 milliLiter(s) (83 mL/Hr) IV Continuous <Continuous>  sodium chloride 0.9% lock flush 3 milliLiter(s) IV Push every 8 hours  tamsulosin 0.4 milliGRAM(s) Oral at bedtime    MEDICATIONS  (PRN):  acetaminophen   Tablet .. 650 milliGRAM(s) Oral every 6 hours PRN Temp greater or equal to 38C (100.4F)  dextrose 40% Gel 15 Gram(s) Oral once PRN Blood Glucose LESS THAN 70 milliGRAM(s)/deciliter  glucagon  Injectable 1 milliGRAM(s) IntraMuscular once PRN Glucose LESS THAN 70 milligrams/deciliter  ondansetron Injectable 4 milliGRAM(s) IV Push every 6 hours PRN Nausea      Allergies    No Known Allergies    Intolerances        Vital Signs Last 24 Hrs  T(C): 36.3 (02 Apr 2019 23:43), Max: 37.2 (02 Apr 2019 08:34)  T(F): 97.3 (02 Apr 2019 23:43), Max: 98.9 (02 Apr 2019 08:34)  HR: 82 (02 Apr 2019 23:43) (61 - 82)  BP: 148/82 (02 Apr 2019 23:43) (140/78 - 148/82)  BP(mean): --  RR: 18 (02 Apr 2019 23:43) (18 - 18)  SpO2: 96% (02 Apr 2019 23:43) (96% - 96%)        PHYSICAL EXAM:    General: Well developed; well nourished; in no acute distress  HEENT: MMM, conjunctiva and sclera clear  H- RRR  L- + B/L rhonchi  Gastrointestinal: Soft, non-tender non-distended; Normal bowel sounds; No rebound or guarding  Extremities: Normal range of motion, No clubbing, cyanosis or edema- left food wrapped in bandage  Neurological: Alert and oriented x3  Skin: Warm and dry. No obvious rash      LABS:                        10.2   7.6   )-----------( 150      ( 01 Apr 2019 17:25 )             32.1     02 Apr 2019 08:11    140    |  106    |  85.0   ----------------------------<  132    4.8     |  15.0   |  3.95     Ca    8.4        02 Apr 2019 08:11    TPro  6.2    /  Alb  2.8    /  TBili  0.5    /  DBili  x      /  AST  538    /  ALT  802    /  AlkPhos  254    / Amylase x      /Lipase x      02 Apr 2019 08:11        Hepatitis B Core IgM Antibody: Nonreact (04-02-19 @ 23:51)  Hepatitis C Virus S/CO Ratio: 0.40 S/CO (04-02-19 @ 23:51)  Hepatitis C Virus Interpretation: Nonreact (04-02-19 @ 23:51)  Hepatitis B Surface Antigen: Nonreact (04-02-19 @ 23:51)  Hepatitis B Core IgM Antibody: Nonreact (04-02-19 @ 17:33)  Hepatitis B Surface Antigen: Nonreact (04-02-19 @ 17:33)  Hepatitis C Virus Interpretation: Nonreact (04-02-19 @ 17:33)  Hepatitis C Virus S/CO Ratio: 0.38 S/CO (04-02-19 @ 17:33)  Hepatitis B Surface Antigen: Nonreact (04-02-19 @ 17:23)  Hepatitis C Virus S/CO Ratio: 0.38 S/CO (04-02-19 @ 17:23)  Hepatitis B Core IgM Antibody: Nonreact (04-02-19 @ 17:23)  Hepatitis C Virus Interpretation: Nonreact (04-02-19 @ 17:23)        RADIOLOGY & ADDITIONAL STUDIES:     < from: US Abdomen Complete (04.02.19 @ 11:45) >  IMPRESSION: Suspect cholelithiasis without gallbladder distention or   positive Vargas sign.  Common bile duct normal.  Pack parenchyma aside from 1.5 cm LEFT hepatic lobe cyst unremarkable.  Bilateral pleural effusions.      < end of copied text >

## 2019-04-03 NOTE — CONSULT NOTE ADULT - PROBLEM SELECTOR RECOMMENDATION 9
- TTE, EF <20% drastically deteriorated from 2017  - add Entresto 24/26 mg BID, for mild diuresis  - change Lopressor to Toprol XL 50mg QD, for better Heart Failure management  - no invasive procedures, such as cath,  at this time due to CKD  - c/w medical management due to poor prognosis - TTE, EF <20% drastically deteriorated from 2017  - add Entresto 24/26 mg BID, for mild diuresis  - change Lopressor to Toprol XL 50mg QD, for better Heart Failure management  - no invasive procedures, such as cath,  at this time due to CKD (would commit him to hemodialysis if so)  - c/w medical management due to overall poor prognosis

## 2019-04-03 NOTE — CONSULT NOTE ADULT - ASSESSMENT
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  Patient unable to provide history due to lethargy and dementia, history obtained from family (through  [facility]) and medical record.  Per medical record patient was recently seen by Vascular for scheduled angiogram for ischemic non healing ulcer over left heel, however procedure was cancelled due to advanced CKD and risk of contrast nephropathy. Patient was at his usual baseline until over the course of the day he was noted to become more confused, coughing, and became hypoxic. He does have a hx of PNA as per family. He does not have any reported history of dysphagia on chart or by family. In the ED patient with RML infiltrate, fever, tachypnea, leukocytosis, and ruled in for sepsis. Lactate normal, MAP/SBP normal range. Consult called for severe HFrEF <20 based on 4/2 TTE.  O2 sat 97% on RA. Patient does not exhibit any obvious signs of CHF.  Daughter and son in law at bedside, awaiting arrival of HCP (oldest daughter) Camilla Roberson to discuss plan of care with Palliative and Hospice.

## 2019-04-03 NOTE — PROGRESS NOTE ADULT - PROBLEM SELECTOR PLAN 1
- most likely ischemia  - ECHO- showed worsening LV function, severe pul HTN, MR, TR  - negative viral serology.   - gallstones, normal CBD on U/S- doubt LFT due to gallstones in setting of normal bilirubin and such high transaminases.- will conitnue to monitor  - check serial LFT

## 2019-04-03 NOTE — PROGRESS NOTE ADULT - ASSESSMENT
Assessment and Plan:   85 y/o male with history of MCI/early dementia, severe RLE PVD with chronic non-healing heel ulcer, CAD, HTN, HLD, DM-2, ICM with EF of 35% presents to the hospital with sepsis from HCAP. Blood culture been negative. Saturating normal at room air. Course complicated by elevated bun/cr, transaminitis, UTI with hx of esbl uti in past. Patient is DNR/DNI. PT recommended KYLEE placement    Metabolic encephalopathy:  secondary to UTI, Pneumonia, Transaminitis elevated bun/cr  Ammonia normal   US abdomen showed cholelithiasis with normal cbd  continue antibiotics.  f/u cultures    UTI:  Urine culture in process  Meropenem day 3 with probiotics  patient has hx of esbl ecoli before    Complicated pneumonia:  HCAP (healthcare-associated pneumonia); likely gram negative pneumonia.    - Based on living in LTC facility and sepsis with cover for HCAP gram neg/pos, MRSA and anaerobes   -Blood culture been negative  -Spiromter, OOB, DVT-P.  - patient saturating well on room air at this time and does not need O2  -was initially on levofloxacin but ab changed to meropenem day 3    TRANSAMINITIS:  Likely due to ischemia TTE yesterday (EF 20%)  US abdomen showed cholelithiasis but no evidence of cholecystitis, with normal cbd  trend lft  check inr, ptt  GI note appreciated  Hepatitis profile in process  ABD is benign      Acute Hypoxic respiratory failure; due to PNA. Improved  - cont. plan as above,     PVD (peripheral vascular disease).    - Based on previous documentation and family at bedside ulcers are about the same, no drainage, foul smell, or surrounding erythema to suggest this is source of infection.   - Will cont. with local wound care and off loading boots.   - Wound care   - podiatry recs appreciated  - Cont. ASA, statin.   WILL consult wound care nurse    Type 2 diabetes mellitus with diabetic peripheral angiopathy without gangrene, with long-term current use of insulin.    - ADA diet, HISS, Accu checks AC/HS.     Arterial Ulcer of left heel, unspecified ulcer stage.   - plan as above. Family states patient didn't want any aggressive care for foot/ulcer.    Elevated bun/cr:  HAS CKD stage 4  Nephrology consulted   avoid nephrotoxic drugs.   US renal showed no evidence of renal stone and hydronephrosis    Hypertension, unspecified type.  Plan: Cont. o/p regimen, dash diet.     DVT prophylaxis:  Heparin q 8 h        Plan discussed with patient daughter Chanda. She mentioned that she will leave  for her country next week. Her sister Jessika [phone] to be . Assessment and Plan:   87 y/o male with history of MCI/early dementia, severe RLE PVD with chronic non-healing heel ulcer, CAD, HTN, HLD, DM-2, ICM with EF of 35% presents to the hospital with sepsis from [facility]CAP. Blood culture been negative. Saturating normal at room air. Course complicated by elevated bun/cr, transaminitis, UTI with hx of esbl uti in past. Patient is DNR/DNI. PT recommended KYLEE placement    Metabolic encephalopathy:  secondary to UTI, Pneumonia, Transaminitis elevated bun/cr  follow up labs this afternoon, was initially refusing labs this morning, family present now and labs to be drawn  Ammonia normal   US abdomen showed cholelithiasis with normal cbd  continue antibiotics.  f/u cultures    UTI:  Urine culture in process  Meropenem day 3 with probiotics  patient has hx of esbl ecoli before    Complicated pneumonia:  HCAP (healthcare-associated pneumonia); likely gram negative pneumonia.    - Based on living in LTC facility and sepsis with cover for HCAP gram neg/pos, MRSA and anaerobes   -Blood culture been negative  -Spiromter, OOB, DVT-P.  - patient saturating well on room air at this time and does not need O2  -was initially on levofloxacin but ab changed to meropenem day 3    TRANSAMINITIS:  Likely due to ischemia, TTE done yesterday (EF 20%)  US abdomen showed cholelithiasis but no evidence of cholecystitis, with normal cbd  trend lft, was initially refusing labs this morning, family present now and labs to be drawn  check inr, ptt, was initially refusing labs this morning, family present now and labs to be drawn  GI note appreciated  Hepatitis profile in process  ABD is benign      Acute Hypoxic respiratory failure; due to PNA. Improved  - cont. plan as above,     PVD (peripheral vascular disease).    - Based on previous documentation and family at bedside ulcers are about the same, no drainage, foul smell, or surrounding erythema to suggest this is source of infection.   - Will cont. with local wound care and off loading boots.   - Wound care   - podiatry recs appreciated  - Cont. ASA, statin.   WILL consult wound care nurse    Type 2 diabetes mellitus with diabetic peripheral angiopathy without gangrene, with long-term current use of insulin.    - ADA diet, HISS, Accu checks AC/HS.     Arterial Ulcer of left heel, unspecified ulcer stage.   - plan as above. Family states patient didn't want any aggressive care for foot/ulcer.    Elevated bun/cr:  HAS CKD stage 4  Nephrology consulted   avoid nephrotoxic drugs.   US renal showed no evidence of renal stone and hydronephrosis    Hypertension, unspecified type.  Plan: Cont. o/p regimen, dash diet.     DVT prophylaxis:  Heparin q 8 h        Plan discussed with patient daughter Chanda. She mentioned that she will leave  for her country next week. Her sister Jessika [phone] to be . Assessment and Plan:   85 y/o male with history of MCI/early dementia, severe RLE PVD with chronic non-healing heel ulcer, CAD, HTN, HLD, DM-2, ICM with EF of 35% presents to the hospital with sepsis from [facility]. Blood culture been negative. Saturating normal at room air. Course complicated by elevated bun/cr, transaminitis, UTI with hx of esbl uti in past. Patient is DNR/DNI. PT recommended KYLEE placement.     Metabolic encephalopathy:  secondary to UTI, Pneumonia, Transaminitis elevated bun/cr  follow up labs this afternoon, was initially refusing labs this morning, family present now and labs to be drawn  Ammonia normal   US abdomen showed cholelithiasis with normal cbd  continue antibiotics.  f/u cultures    UTI:  Urine culture in process  Meropenem day 3 with probiotics  patient has hx of esbl ecoli before    Complicated pneumonia:  HCAP (healthcare-associated pneumonia); likely gram negative pneumonia.    - Based on living in LTC facility and sepsis with cover for HCAP gram neg/pos, MRSA and anaerobes   -Blood culture been negative  -Spiromter, OOB, DVT-P.  - patient saturating well on room air at this time and does not need O2  -was initially on levofloxacin but ab changed to meropenem day 3    TRANSAMINITIS:  Likely due to ischemia, TTE done yesterday (EF < 20%)  US abdomen showed cholelithiasis but no evidence of cholecystitis, with normal cbd  trend lft, was initially refusing labs this morning, family present now and labs to be drawn  check inr, ptt, was initially refusing labs this morning, family present now and labs to be drawn  GI note appreciated  Hepatitis profile in process  ABD is benign      Acute Hypoxic respiratory failure; due to PNA. Improved  - cont. plan as above,     PVD (peripheral vascular disease).    - Based on previous documentation and family at bedside ulcers are about the same, no drainage, foul smell, or surrounding erythema to suggest this is source of infection.   - Will cont. with local wound care and off loading boots.   - Wound care   - podiatry recs appreciated  - Cont. ASA, statin.   WILL consult wound care nurse    Type 2 diabetes mellitus with diabetic peripheral angiopathy without gangrene, with long-term current use of insulin.    - ADA diet, HISS, Accu checks AC/HS.     Arterial Ulcer of left heel, unspecified ulcer stage.   - plan as above. Family states patient didn't want any aggressive care for foot/ulcer.    Elevated bun/cr:  HAS CKD stage 4  Nephrology consulted   avoid nephrotoxic drugs.   US renal showed no evidence of renal stone and hydronephrosis    Hypertension, unspecified type.  Plan: Cont. o/p regimen, dash diet.     DVT prophylaxis:  Heparin q 8 h        Plan discussed with patient daughter Chanda. She mentioned that she will leave  for her country next week. Her sister Jessika [phone] to be . Assessment and Plan:   85 y/o male with history of MCI/early dementia, severe RLE PVD with chronic non-healing heel ulcer, CAD, HTN, HLD, DM-2, ICM with EF of 35% presents to the hospital with sepsis from [facility]. Blood culture been negative. Saturating normal at room air. Course complicated by elevated bun/cr, transaminitis, UTI with hx of esbl uti in past with severely reduced EF <20%. Cardiology consulted. Patient is DNR/DNI. Hospice consult placed. PT recommended [facility] placement.     Acute Metabolic encephalopathy:  secondary to UTI, Pneumonia, Transaminitis elevated bun/cr  follow up labs this afternoon, was initially refusing labs this morning, family present now and labs to be drawn  Ammonia normal   US abdomen showed cholelithiasis with normal cbd  continue antibiotics.  f/u cultures    UTI:  Urine culture in process  Meropenem day 3 with probiotics  patient has hx of esbl ecoli before    Complicated pneumonia:  HCAP (healthcare-associated pneumonia); likely gram negative pneumonia.    - Based on living in LTC facility and sepsis with cover for HCAP gram neg/pos, MRSA and anaerobes   -Blood culture been negative  -Spiromter, OOB, DVT-P.  - patient saturating well on room air at this time and does not need O2  -was initially on levofloxacin but ab changed to meropenem day 3    TRANSAMINITIS:  Likely due to ischemia, TTE done yesterday (EF < 20%)  US abdomen showed cholelithiasis but no evidence of cholecystitis, with normal cbd  trend lft, was initially refusing labs this morning, family present now and labs to be drawn  check inr, ptt, was initially refusing labs this morning, family present now and labs to be drawn  GI note appreciated  Hepatitis profile negative  ABD is benign      Acute Hypoxic respiratory failure; due to PNA. Improved  - cont. plan as above,     PVD (peripheral vascular disease).    - Based on previous documentation and family at bedside ulcers are about the same, no drainage, foul smell, or surrounding erythema to suggest this is source of infection.   - Will cont. with local wound care and off loading boots.   - Wound care   - podiatry recs appreciated  - Cont. ASA, statin.   Vascular note appreciated    Type 2 diabetes mellitus with diabetic peripheral angiopathy without gangrene, with long-term current use of insulin.    - ADA diet, HISS, Accu checks AC/HS.     Arterial Ulcer of left heel, unspecified ulcer stage.   - plan as above. Family states patient didn't want any aggressive care for foot/ulcer.    Elevated bun/cr:  HAS CKD stage 4  Nephrology consulted   avoid nephrotoxic drugs.   US renal showed no evidence of renal stone and hydronephrosis    Hypertension, unspecified type.  Plan: Cont. o/p regimen, dash diet.     DVT prophylaxis:  Heparin q 8 h        Plan discussed with patient daughter Chanda. She mentioned that she will leave  for her country next week. Her sister Jessika [phone] to be .

## 2019-04-03 NOTE — CONSULT NOTE ADULT - SUBJECTIVE AND OBJECTIVE BOX
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  Patient unable to provide history due to lethargy and dementia, history obtained from family (through  [facility]) and medical record.  Per medical record patient was recently seen by Vascular for scheduled angiogram for ischemic non healing ulcer over left heel, however procedure was cancelled due to advanced CKD and risk of contrast nephropathy. Patient was at his usual baseline until over the course of the day he was noted to become more confused, coughing, and became hypoxic. He does have a hx of PNA as per family. He does not have any reported history of dysphagia on chart or by family. In the ED patient with RML infiltrate, fever, tachypnea, leukocytosis, and ruled in for sepsis. Lactate normal, MAP/SBP normal range. Consult called for severe HFrEF <20 based on 4/2 TTE.  O2 sat 97% on RA. Patient does not exhibit any obvious signs of CHF.  Daughter and son in law at bedside, awaiting arrival of HCP (oldest daughter) Camilla Lucretiaelver to discuss plan of care with Palliative and Hospice.        PAST MEDICAL & SURGICAL HISTORY:  Poor historian  Ulcer of foot: left heel  PVD (peripheral vascular disease)  Coronary heart disease  Chronic kidney disease  History of amputation of toe: every toe on left foot has been amputated  Pacemaker  PVD (peripheral vascular disease)  CRI (chronic renal insufficiency), stage 3 (moderate)  HTN (hypertension)  Diabetes  History of peripheral artery bypass: with vein graft and TMA in Aug 2017  History of amputation of toe: all toes on left foot have been amputated  Cardiac pacemaker: to right anterior chest wall      PREVIOUS DIAGNOSTIC TESTING:      ECHO  FINDINGS:  < from: TTE Echo Complete w/Doppler (04.02.19 @ 15:27) >  Summary:   1. Endocardial visualization was enhanced with intravenous echocontrast.   2. Moderately enlarged left atrium.   3. There is mild concentric left ventricular hypertrophy.   4. Segmental wall motion abnormalities that appear worse in apex.   Aneurysmal apex.      There is normal rest perfusion in all territories except the distal   LAD.      Findings are suggestive of non-ischemic + ischemic cardiomyopathy.   5. Left ventricular ejection fraction, by visual estimation, is <20%.   6. Elevated mean left atrial pressure. (LAP = 22 mm hg)   7. Severely enlarged right atrium.   8. The right ventricular size is normal. RV systolic function is mildly   reduced.   9. Moderate mitral valve regurgitation.  10. Moderate tricuspid regurgitation.  11. Estimated pulmonary artery systolic pressure is 82 mmHg-severe   pulmonary hypertension.  12. Trivial pericardial effusion.  13. Moderate pleural effusion in both left and right lateral regions.  14. Compared to study from 12/2017, the LV function has significantly   detiorated.    < end of copied text >      Allergies    No Known Allergies    Intolerances        MEDICATIONS  (STANDING):  aspirin enteric coated 81 milliGRAM(s) Oral daily  atorvastatin 40 milliGRAM(s) Oral at bedtime  cadexomer iodine 0.9% Gel 1 Application(s) Topical daily  chlorhexidine 2% Cloths 1 Application(s) Topical daily  dextrose 5%. 1000 milliLiter(s) (50 mL/Hr) IV Continuous <Continuous>  dextrose 50% Injectable 12.5 Gram(s) IV Push once  dextrose 50% Injectable 25 Gram(s) IV Push once  dextrose 50% Injectable 25 Gram(s) IV Push once  heparin  Injectable 5000 Unit(s) SubCutaneous every 8 hours  insulin lispro (HumaLOG) corrective regimen sliding scale   SubCutaneous Before meals and at bedtime  meropenem  IVPB 500 milliGRAM(s) IV Intermittent every 12 hours  metoprolol succinate ER 50 milliGRAM(s) Oral daily  saccharomyces boulardii 250 milliGRAM(s) Oral two times a day  sacubitril 24 mG/valsartan 26 mG 1 Tablet(s) Oral two times a day  sodium bicarbonate 650 milliGRAM(s) Oral three times a day  sodium chloride 0.45% 1000 milliLiter(s) (83 mL/Hr) IV Continuous <Continuous>  sodium chloride 0.9% lock flush 3 milliLiter(s) IV Push every 8 hours  tamsulosin 0.4 milliGRAM(s) Oral at bedtime    MEDICATIONS  (PRN):  acetaminophen   Tablet .. 650 milliGRAM(s) Oral every 6 hours PRN Temp greater or equal to 38C (100.4F)  dextrose 40% Gel 15 Gram(s) Oral once PRN Blood Glucose LESS THAN 70 milliGRAM(s)/deciliter  glucagon  Injectable 1 milliGRAM(s) IntraMuscular once PRN Glucose LESS THAN 70 milligrams/deciliter  ondansetron Injectable 4 milliGRAM(s) IV Push every 6 hours PRN Nausea    Home Medications:  acetaminophen 325 mg oral tablet: 2 tab(s) orally every 4 hours, As Needed (15 Mar 2019 09:37)  aspirin 81 mg oral tablet: 1 tab(s) orally once a day (15 Mar 2019 09:34)  atorvastatin 40 mg oral tablet: 1 tab(s) orally once a day (at bedtime) (06 Nov 2017 10:28)  insulin glargine: 10 unit(s) subcutaneous once a day (at bedtime) (16 Nov 2017 11:50)  lactobacillus acidophilus oral capsule: 1 cap(s) orally once a day (16 Nov 2017 11:50)  metoprolol tartrate 25 mg oral tablet: 1 tab(s) orally 3 times a day (29 Mar 2019 23:32)  Neurontin 100 mg oral capsule: 1 cap(s) orally 3 times a day (29 Mar 2019 23:31)  pantoprazole 40 mg oral delayed release tablet: 1 tab(s) orally once a day (before a meal) (06 Nov 2017 10:28)  tamsulosin 0.4 mg oral capsule: 1 cap(s) orally once a day (at bedtime) (06 Nov 2017 10:28)        FAMILY HISTORY:  No pertinent family history in first degree relatives      SOCIAL HISTORY: wheelchair bound in NH    CIGARETTES: denies    ALCOHOL: denies    REVIEW OF SYSTEMS:  Due to altered mental status, subjective information was not able to be obtained from the patient. History was obtained, to the extent possible, from review of the chart and collateral sources of information.      Vital Signs Last 24 Hrs  T(C): 37.2 (03 Apr 2019 07:36), Max: 37.2 (03 Apr 2019 07:36)  T(F): 98.9 (03 Apr 2019 07:36), Max: 98.9 (03 Apr 2019 07:36)  HR: 83 (03 Apr 2019 07:36) (70 - 83)  BP: 132/65 (03 Apr 2019 07:36) (132/65 - 148/82)  BP(mean): --  RR: 18 (03 Apr 2019 07:36) (18 - 18)  SpO2: 95% (03 Apr 2019 07:36) (95% - 96%)    Daily     Daily     I&O's Detail      PHYSICAL EXAM:  Appearance: Normal, lethargic  HEENT:   Normal oral mucosa, PERRL, EOMI, sclera non-icteric	  Lymphatic: No cervical lymphadenopathy  Cardiovascular: + JVD. Normal S1 S2, No JVD, No cardiac murmurs, No carotid bruits, No peripheral edema  Respiratory: Lungs diminished  Psychiatry: A & O to place and self.  Gastrointestinal:  Soft, Non-tender, + BS, no bruits	  Skin: Left foot wounds wrapped, dressing clean dry and intact. No rashes, No ecchymoses, No cyanosis  Neurologic: Grossly non-focal with full strength in all four extremities  Extremities: Normal range of motion, No clubbing, cyanosis or edema  Vascular: Peripheral pulses palpable 2+ bilaterally      INTERPRETATION OF TELEMETRY: no telemetry    ECG: v-paced at 74bpm    LABS:                        10.2   7.6   )-----------( 150      ( 01 Apr 2019 17:25 )             32.1     04-02    140  |  106  |  85.0<H>  ----------------------------<  132<H>  4.8   |  15.0<L>  |  3.95<H>    Ca    8.4<L>      02 Apr 2019 08:11    TPro  6.2<L>  /  Alb  2.8<L>  /  TBili  0.5  /  DBili  x   /  AST  538<H>  /  ALT  802<H>  /  AlkPhos  254<H>  04-02            I&O's Summary    BNP    RADIOLOGY & ADDITIONAL STUDIES:  < from: Xray Chest 1 View-PORTABLE IMMEDIATE (03.29.19 @ 20:56) >  EXAM:  XR CHEST PORTABLE IMMED 1V                          PROCEDURE DATE:  03/29/2019          INTERPRETATION:  Chest radiograph (one view)     CPT 75709    CLINICAL INFORMATION:  Patient is unable to communicate. Sepsis.  Short   of breath.     TECHNIQUE:  Single frontal view of the chest was obtained.    FINDINGS:  Prior study dated 12/27/2017 was available for review.    The lungs demonstrate increased pulmonary vascular congestion compatible   congestive heart failure. Small bilateral pleural effusions are noted.    The heart is difficult to evaluate. Pacemaker is noted.           IMPRESSION: Findings suggestive of congestive heart failure. Small   bilateral pleural effusions are noted.   Pacemaker present.     < end of copied text > Patient is a 85y old  Male who presents with a chief complaint of Fever, confusion, cough, Pneumonia (03 Apr 2019 11:30)      HPI:86yo Telugu speaking male with PMH CKD, HTN, HLD, Dementia, left foot amputation due to severe PVD, CAD, DM-2, mild dementia, wheelchair bound, from NH.  Patient unable to provide history due to lethargy and dementia, history obtained from family (through  [facility]) and medical record.  Per medical record patient was recently seen by Vascular for scheduled angiogram for ischemic non healing ulcer over left heel, however procedure was cancelled due to advanced CKD and risk of contrast nephropathy. Patient was at his usual baseline until over the course of the day he was noted to become more confused, coughing, and became hypoxic. He does have a hx of PNA as per family. He does not have any reported history of dysphagia on chart or by family. In the ED patient with RML infiltrate, fever, tachypnea, leukocytosis, and ruled in for sepsis. Lactate normal, MAP/SBP normal range. Consult called for severe HFrEF <20 based on 4/2 TTE.  O2 sat 97% on RA. Patient does not exhibit any obvious signs of CHF.  Daughter and son in law at bedside, awaiting arrival of HCP (oldest daughter) Camilla Lucretiaelver to discuss plan of care with Palliative and Hospice.        PAST MEDICAL & SURGICAL HISTORY:  Poor historian  Ulcer of foot: left heel  PVD (peripheral vascular disease)  Coronary heart disease  Chronic kidney disease  History of amputation of toe: every toe on left foot has been amputated  Pacemaker  PVD (peripheral vascular disease)  CRI (chronic renal insufficiency), stage 3 (moderate)  HTN (hypertension)  Diabetes  History of peripheral artery bypass: with vein graft and TMA in Aug 2017  History of amputation of toe: all toes on left foot have been amputated  Cardiac pacemaker: to right anterior chest wall      PREVIOUS DIAGNOSTIC TESTING:      ECHO  FINDINGS:  < from: TTE Echo Complete w/Doppler (04.02.19 @ 15:27) >  Summary:   1. Endocardial visualization was enhanced with intravenous echocontrast.   2. Moderately enlarged left atrium.   3. There is mild concentric left ventricular hypertrophy.   4. Segmental wall motion abnormalities that appear worse in apex.   Aneurysmal apex.      There is normal rest perfusion in all territories except the distal   LAD.      Findings are suggestive of non-ischemic + ischemic cardiomyopathy.   5. Left ventricular ejection fraction, by visual estimation, is <20%.   6. Elevated mean left atrial pressure. (LAP = 22 mm hg)   7. Severely enlarged right atrium.   8. The right ventricular size is normal. RV systolic function is mildly   reduced.   9. Moderate mitral valve regurgitation.  10. Moderate tricuspid regurgitation.  11. Estimated pulmonary artery systolic pressure is 82 mmHg-severe   pulmonary hypertension.  12. Trivial pericardial effusion.  13. Moderate pleural effusion in both left and right lateral regions.  14. Compared to study from 12/2017, the LV function has significantly   detiorated.    < end of copied text >      Allergies    No Known Allergies    Intolerances        MEDICATIONS  (STANDING):  aspirin enteric coated 81 milliGRAM(s) Oral daily  atorvastatin 40 milliGRAM(s) Oral at bedtime  cadexomer iodine 0.9% Gel 1 Application(s) Topical daily  chlorhexidine 2% Cloths 1 Application(s) Topical daily  dextrose 5%. 1000 milliLiter(s) (50 mL/Hr) IV Continuous <Continuous>  dextrose 50% Injectable 12.5 Gram(s) IV Push once  dextrose 50% Injectable 25 Gram(s) IV Push once  dextrose 50% Injectable 25 Gram(s) IV Push once  heparin  Injectable 5000 Unit(s) SubCutaneous every 8 hours  insulin lispro (HumaLOG) corrective regimen sliding scale   SubCutaneous Before meals and at bedtime  meropenem  IVPB 500 milliGRAM(s) IV Intermittent every 12 hours  metoprolol succinate ER 50 milliGRAM(s) Oral daily  saccharomyces boulardii 250 milliGRAM(s) Oral two times a day  sacubitril 24 mG/valsartan 26 mG 1 Tablet(s) Oral two times a day  sodium bicarbonate 650 milliGRAM(s) Oral three times a day  sodium chloride 0.45% 1000 milliLiter(s) (83 mL/Hr) IV Continuous <Continuous>  sodium chloride 0.9% lock flush 3 milliLiter(s) IV Push every 8 hours  tamsulosin 0.4 milliGRAM(s) Oral at bedtime    MEDICATIONS  (PRN):  acetaminophen   Tablet .. 650 milliGRAM(s) Oral every 6 hours PRN Temp greater or equal to 38C (100.4F)  dextrose 40% Gel 15 Gram(s) Oral once PRN Blood Glucose LESS THAN 70 milliGRAM(s)/deciliter  glucagon  Injectable 1 milliGRAM(s) IntraMuscular once PRN Glucose LESS THAN 70 milligrams/deciliter  ondansetron Injectable 4 milliGRAM(s) IV Push every 6 hours PRN Nausea    Home Medications:  acetaminophen 325 mg oral tablet: 2 tab(s) orally every 4 hours, As Needed (15 Mar 2019 09:37)  aspirin 81 mg oral tablet: 1 tab(s) orally once a day (15 Mar 2019 09:34)  atorvastatin 40 mg oral tablet: 1 tab(s) orally once a day (at bedtime) (06 Nov 2017 10:28)  insulin glargine: 10 unit(s) subcutaneous once a day (at bedtime) (16 Nov 2017 11:50)  lactobacillus acidophilus oral capsule: 1 cap(s) orally once a day (16 Nov 2017 11:50)  metoprolol tartrate 25 mg oral tablet: 1 tab(s) orally 3 times a day (29 Mar 2019 23:32)  Neurontin 100 mg oral capsule: 1 cap(s) orally 3 times a day (29 Mar 2019 23:31)  pantoprazole 40 mg oral delayed release tablet: 1 tab(s) orally once a day (before a meal) (06 Nov 2017 10:28)  tamsulosin 0.4 mg oral capsule: 1 cap(s) orally once a day (at bedtime) (06 Nov 2017 10:28)        FAMILY HISTORY:  No pertinent family history in first degree relatives      SOCIAL HISTORY: wheelchair bound in NH    CIGARETTES: denies    ALCOHOL: denies    REVIEW OF SYSTEMS:  Due to altered mental status, subjective information was not able to be obtained from the patient. History was obtained, to the extent possible, from review of the chart and collateral sources of information.      Vital Signs Last 24 Hrs  T(C): 37.2 (03 Apr 2019 07:36), Max: 37.2 (03 Apr 2019 07:36)  T(F): 98.9 (03 Apr 2019 07:36), Max: 98.9 (03 Apr 2019 07:36)  HR: 83 (03 Apr 2019 07:36) (70 - 83)  BP: 132/65 (03 Apr 2019 07:36) (132/65 - 148/82)  BP(mean): --  RR: 18 (03 Apr 2019 07:36) (18 - 18)  SpO2: 95% (03 Apr 2019 07:36) (95% - 96%)    Daily     Daily     I&O's Detail      PHYSICAL EXAM:  Appearance: NAD, lethargic  HEENT:   Normal oral mucosa, PERRL, EOMI, sclera non-icteric	  Lymphatic: No cervical lymphadenopathy  Cardiovascular: + JVD. Normal S1 S2, No JVD, No cardiac murmurs, No carotid bruits, No peripheral edema  Respiratory: Lungs diminished breath sounds  Psychiatry: A & O to place and self.  Gastrointestinal:  Soft, Non-tender, + BS, no bruits	  Skin: Left foot wounds wrapped, dressing clean dry and intact. No rashes, No ecchymoses, No cyanosis  Neurologic: Grossly non-focal with full strength in all four extremities  Extremities: Normal range of motion, No clubbing, cyanosis or edema  Vascular: Peripheral pulses palpable 2+ bilaterally    INTERPRETATION OF TELEMETRY: no telemetry    ECG: v-paced at 74bpm    LABS:                        10.2   7.6   )-----------( 150      ( 01 Apr 2019 17:25 )             32.1     04-02    140  |  106  |  85.0<H>  ----------------------------<  132<H>  4.8   |  15.0<L>  |  3.95<H>    Ca    8.4<L>      02 Apr 2019 08:11    TPro  6.2<L>  /  Alb  2.8<L>  /  TBili  0.5  /  DBili  x   /  AST  538<H>  /  ALT  802<H>  /  AlkPhos  254<H>  04-02    RADIOLOGY & ADDITIONAL STUDIES:  < from: Xray Chest 1 View-PORTABLE IMMEDIATE (03.29.19 @ 20:56) >  EXAM:  XR CHEST PORTABLE IMMED 1V                          PROCEDURE DATE:  03/29/2019          INTERPRETATION:  Chest radiograph (one view)     CPT 08202    CLINICAL INFORMATION:  Patient is unable to communicate. Sepsis.  Short   of breath.     TECHNIQUE:  Single frontal view of the chest was obtained.    FINDINGS:  Prior study dated 12/27/2017 was available for review.    The lungs demonstrate increased pulmonary vascular congestion compatible   congestive heart failure. Small bilateral pleural effusions are noted.    The heart is difficult to evaluate. Pacemaker is noted.           IMPRESSION: Findings suggestive of congestive heart failure. Small   bilateral pleural effusions are noted.   Pacemaker present.     < end of copied text >

## 2019-04-03 NOTE — PROGRESS NOTE ADULT - ATTENDING COMMENTS
85 y/o male with history of MCI/early dementia, severe RLE PVD with chronic non-healing heel ulcer, CAD, HTN, HLD, DM-2, ICM with EF of 35% presents to the hospital with sepsis from [facility]. Blood culture been negative. Saturating normal at room air. Course complicated by elevated bun/cr, transaminitis, UTI with hx of esbl uti in past with severely reduced EF <20%. Cardiology consulted. Patient is DNR/DNI. Hospice consult placed. PT recommended [facility] placement.     plan:  Hospice and palliative consult  Family meeting tomorrow with HCP daughter Chanda  patient has transminitis, CRYSTAL ON ckd, UTI, severely refused EF  patient is DNR/DNI and doesn't want surgical aggressive interventions.  Cardiology note appreciated, entresto added  Spoke to patient son in law and daughter marcello and update about current condition. 87 y/o male with history of MCI/early dementia, severe RLE PVD with chronic non-healing heel ulcer, CAD, HTN, HLD, DM-2, ICM with EF of 35% presents to the hospital with sepsis from [facility]. Blood culture been negative. Saturating normal at room air. Course complicated by elevated bun/cr, transaminitis, UTI with hx of esbl uti in past with severely reduced EF <20%. Cardiology consulted. Patient is DNR/DNI. Hospice consult placed. PT recommended [facility] placement.     plan:  Hospice and palliative consult  Family meeting tomorrow with HCP daughter Chanda  patient has transminitis, CRYSTAL ON ckd, UTI, severely refused EF, left PVD  patient is DNR/DNI and doesn't want surgical aggressive interventions.  Cardiology note appreciated, entresto added  Spoke to patient son in law and daughter marcello and update about current condition.

## 2019-04-03 NOTE — PROGRESS NOTE ADULT - SUBJECTIVE AND OBJECTIVE BOX
JUAN MIGUEL PARADA    [id number]    85y      Male    CC: Fever, confusion, cough, Pneumonia (29 Mar 2019 23:10)    UTI, hx of esbl ecoli uti  transaminitis  elevated bun/cr  cholelithiasis  Acute metabolic encephalopthy    overnight events:  Was seen and examined at bedside with interpeter Brisa at bedside. Daughter and son in law present. Pt responds to name verbally, opens eyes periodically, follows simple commands with yes and no answers, closes eyes after. Denies any active complaints. Family made aware of worsening lab values, family meeting set to take place tomorrow afternoon with HCP present. GI saw patient also. To see palliative    HPI:  84 y/o male sent from NH for above. 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 Patient was recently seen by Vascular for scheduled angiogram for ischemic non healing ulcer over left heel, however procedure was cancelled due to advanced CKD and risk of contrast nephropathy. Patient was at his usual baseline until over the course of the day he was noted to become more confused, coughing, and became hypoxic. He does have a hx of PNA as per family. He does not have any reported history of dysphagia on chart or by family. In the ED patient with RML infiltrate, fever, tachypnea, leukocytosis, and ruled in for sepsis.    ROS:  Difficulty to obtain due to pt medical condition    PHYSICAL EXAM:  General: Well developed; well nourished; sleepy, respond to verbal commands and follows simple commands  Eyes: PERRLA, EOMI; conjunctiva and sclera clear  Respiratory: crackles in the right lower lung field  Cardiovascular: Regular rate and rhythm. S1 and S2 Normal;   Gastrointestinal: Soft non-tender non-distended; Normal bowel sounds  Genitourinary: No costovertebral angle tenderness  Extremities: Normal range of motion, No clubbing, cyanosis or edema  Vascular: Peripheral pulses palpable 2+ bilaterally  Neurological: Alert and oriented x1    Vital Signs Last 24 Hrs  T(C): 37.2 (03 Apr 2019 07:36), Max: 37.2 (03 Apr 2019 07:36)  T(F): 98.9 (03 Apr 2019 07:36), Max: 98.9 (03 Apr 2019 07:36)  HR: 83 (03 Apr 2019 07:36) (70 - 83)  BP: 132/65 (03 Apr 2019 07:36) (132/65 - 148/82)  BP(mean): --  RR: 18 (03 Apr 2019 07:36) (18 - 18)  SpO2: 95% (03 Apr 2019 07:36) (95% - 96%)    MEDICATIONS  (STANDING):  aspirin enteric coated 81 milliGRAM(s) Oral daily  atorvastatin 40 milliGRAM(s) Oral at bedtime  cadexomer iodine 0.9% Gel 1 Application(s) Topical daily  chlorhexidine 2% Cloths 1 Application(s) Topical daily  dextrose 5%. 1000 milliLiter(s) (50 mL/Hr) IV Continuous <Continuous>  dextrose 50% Injectable 12.5 Gram(s) IV Push once  dextrose 50% Injectable 25 Gram(s) IV Push once  dextrose 50% Injectable 25 Gram(s) IV Push once  heparin  Injectable 5000 Unit(s) SubCutaneous every 8 hours  insulin lispro (HumaLOG) corrective regimen sliding scale   SubCutaneous Before meals and at bedtime  meropenem  IVPB 500 milliGRAM(s) IV Intermittent every 12 hours  metoprolol tartrate 25 milliGRAM(s) Oral three times a day  saccharomyces boulardii 250 milliGRAM(s) Oral two times a day  sodium bicarbonate 650 milliGRAM(s) Oral three times a day  sodium chloride 0.45% 1000 milliLiter(s) (83 mL/Hr) IV Continuous <Continuous>  sodium chloride 0.9% lock flush 3 milliLiter(s) IV Push every 8 hours  tamsulosin 0.4 milliGRAM(s) Oral at bedtime    MEDICATIONS  (PRN):  acetaminophen   Tablet .. 650 milliGRAM(s) Oral every 6 hours PRN Temp greater or equal to 38C (100.4F)  dextrose 40% Gel 15 Gram(s) Oral once PRN Blood Glucose LESS THAN 70 milliGRAM(s)/deciliter  glucagon  Injectable 1 milliGRAM(s) IntraMuscular once PRN Glucose LESS THAN 70 milligrams/deciliter  ondansetron Injectable 4 milliGRAM(s) IV Push every 6 hours PRN Nausea JUAN MIGUEL PARADA    [id number]    85y      Male    CC: Fever, confusion, cough, Pneumonia (29 Mar 2019 23:10)    UTI, hx of esbl ecoli uti  transaminitis  elevated bun/cr  cholelithiasis  Acute metabolic encephalopthy    overnight events:  Was seen and examined at bedside with interpeter Brisa at bedside. Daughter and son in law present. Pt responds to name verbally, opens eyes periodically, follows simple commands with yes and no answers, closes eyes after. Denies any active complaints. Family made aware of worsening lab values, family meeting set to take place tomorrow afternoon with HCP present. GI saw patient also. To see palliative    HPI:  86 y/o male sent from NH for above. 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 Patient was recently seen by Vascular for scheduled angiogram for ischemic non healing ulcer over left heel, however procedure was cancelled due to advanced CKD and risk of contrast nephropathy. Patient was at his usual baseline until over the course of the day he was noted to become more confused, coughing, and became hypoxic. He does have a hx of PNA as per family. He does not have any reported history of dysphagia on chart or by family. In the ED patient with RML infiltrate, fever, tachypnea, leukocytosis, and ruled in for sepsis.    ROS:  ROS:  Constitutional: No fevers, no chills, no fatigue  ENT/Mouth: No Hearing Changes, no ear pain, No Nasal Congestion  Cardiovascular: No Chest Pain, no palpitations  Respiratory: No Cough, No Sputum, No Wheezing, No Smoke Exposure, No Dyspnea   Gastrointestinal: No Nausea, No Vomiting, No Diarrhea, No Constipation, No Pain,   Genitourinary: no dysuria, no urinary frequency  Musculoskeletal: No pain, no weakness  Skin: No rash, No lesion  Neuro: No Weakness, No Numbness, No Paresthesias, No Loss of Consciousness  Psych: No Anxiety/Panic, No Depression, No Insomnia,   and confusionHeme/Lymph: No Bruising, No Bleeding:iculty to obtain due to pt medical condition     PHYSICAL EXAM:  General: Well developed; well nourished; sleepy, respond to verbal commands and follows simple commands  Eyes: PERRLA, EOMI; conjunctiva and sclera clear  Respiratory: rales in the right lower lung field  Cardiovascular: Regular rate and rhythm. S1 and S2 Normal;   Gastrointestinal: Soft non-tender non-distended; Normal bowel sounds  Genitourinary: No costovertebral angle tenderness  Extremities: Normal range of motion, No clubbing, cyanosis or edema  Vascular: Peripheral pulses palpable 2+ bilaterally  Neurological: Alert and oriented x1    Vital Signs Last 24 Hrs  T(C): 37.2 (03 Apr 2019 07:36), Max: 37.2 (03 Apr 2019 07:36)  T(F): 98.9 (03 Apr 2019 07:36), Max: 98.9 (03 Apr 2019 07:36)  HR: 83 (03 Apr 2019 07:36) (70 - 83)  BP: 132/65 (03 Apr 2019 07:36) (132/65 - 148/82)  BP(mean): --  RR: 18 (03 Apr 2019 07:36) (18 - 18)  SpO2: 95% (03 Apr 2019 07:36) (95% - 96%)    MEDICATIONS  (STANDING):  aspirin enteric coated 81 milliGRAM(s) Oral daily  atorvastatin 40 milliGRAM(s) Oral at bedtime  cadexomer iodine 0.9% Gel 1 Application(s) Topical daily  chlorhexidine 2% Cloths 1 Application(s) Topical daily  dextrose 5%. 1000 milliLiter(s) (50 mL/Hr) IV Continuous <Continuous>  dextrose 50% Injectable 12.5 Gram(s) IV Push once  dextrose 50% Injectable 25 Gram(s) IV Push once  dextrose 50% Injectable 25 Gram(s) IV Push once  heparin  Injectable 5000 Unit(s) SubCutaneous every 8 hours  insulin lispro (HumaLOG) corrective regimen sliding scale   SubCutaneous Before meals and at bedtime  meropenem  IVPB 500 milliGRAM(s) IV Intermittent every 12 hours  metoprolol tartrate 25 milliGRAM(s) Oral three times a day  saccharomyces boulardii 250 milliGRAM(s) Oral two times a day  sodium bicarbonate 650 milliGRAM(s) Oral three times a day  sodium chloride 0.45% 1000 milliLiter(s) (83 mL/Hr) IV Continuous <Continuous>  sodium chloride 0.9% lock flush 3 milliLiter(s) IV Push every 8 hours  tamsulosin 0.4 milliGRAM(s) Oral at bedtime    MEDICATIONS  (PRN):  acetaminophen   Tablet .. 650 milliGRAM(s) Oral every 6 hours PRN Temp greater or equal to 38C (100.4F)  dextrose 40% Gel 15 Gram(s) Oral once PRN Blood Glucose LESS THAN 70 milliGRAM(s)/deciliter  glucagon  Injectable 1 milliGRAM(s) IntraMuscular once PRN Glucose LESS THAN 70 milligrams/deciliter  ondansetron Injectable 4 milliGRAM(s) IV Push every 6 hours PRN Nausea JUAN MIGUEL PARADA    [id number]    85y      Male    CC: Fever, confusion, cough, Pneumonia (29 Mar 2019 23:10)    UTI, hx of esbl ecoli uti  transaminitis  elevated bun/cr  cholelithiasis  Acute metabolic encephalopthy    overnight events:  Was seen and examined at bedside with interpeter Brisa at bedside. Daughter and son in law present. Pt responds to name verbally, opens eyes periodically, follows simple commands with yes and no answers, closes eyes after. Denies any active complaints. Family made aware of worsening lab values, family meeting set to take place tomorrow afternoon with HCP present. GI saw patient also. To see palliative. SHAE showed EF < 20%, will consult cardiology    HPI:  86 y/o male sent from NH for above. 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 Patient was recently seen by Vascular for scheduled angiogram for ischemic non healing ulcer over left heel, however procedure was cancelled due to advanced CKD and risk of contrast nephropathy. Patient was at his usual baseline until over the course of the day he was noted to become more confused, coughing, and became hypoxic. He does have a hx of PNA as per family. He does not have any reported history of dysphagia on chart or by family. In the ED patient with RML infiltrate, fever, tachypnea, leukocytosis, and ruled in for sepsis.    ROS:  ROS:  Constitutional: No fevers, no chills, no fatigue  ENT/Mouth: No Hearing Changes, no ear pain, No Nasal Congestion  Cardiovascular: No Chest Pain, no palpitations  Respiratory: No Cough, No Sputum, No Wheezing, No Smoke Exposure, No Dyspnea   Gastrointestinal: No Nausea, No Vomiting, No Diarrhea, No Constipation, No Pain,   Genitourinary: no dysuria, no urinary frequency  Musculoskeletal: No pain, no weakness  Skin: No rash, No lesion  Neuro: No Weakness, No Numbness, No Paresthesias, No Loss of Consciousness  Psych: No Anxiety/Panic, No Depression, No Insomnia,   and confusionHeme/Lymph: No Bruising, No Bleeding:iculty to obtain due to pt medical condition     PHYSICAL EXAM:  General: Well developed; well nourished; sleepy, respond to verbal commands and follows simple commands  Eyes: PERRLA, EOMI; conjunctiva and sclera clear  Respiratory: rales in the right lower lung field  Cardiovascular: Regular rate and rhythm. S1 and S2 Normal;   Gastrointestinal: Soft non-tender non-distended; Normal bowel sounds  Genitourinary: No costovertebral angle tenderness  Extremities: Normal range of motion, No clubbing, cyanosis or edema  Vascular: Peripheral pulses palpable 2+ bilaterally  Neurological: Alert and oriented x1    Vital Signs Last 24 Hrs  T(C): 37.2 (03 Apr 2019 07:36), Max: 37.2 (03 Apr 2019 07:36)  T(F): 98.9 (03 Apr 2019 07:36), Max: 98.9 (03 Apr 2019 07:36)  HR: 83 (03 Apr 2019 07:36) (70 - 83)  BP: 132/65 (03 Apr 2019 07:36) (132/65 - 148/82)  BP(mean): --  RR: 18 (03 Apr 2019 07:36) (18 - 18)  SpO2: 95% (03 Apr 2019 07:36) (95% - 96%)    MEDICATIONS  (STANDING):  aspirin enteric coated 81 milliGRAM(s) Oral daily  atorvastatin 40 milliGRAM(s) Oral at bedtime  cadexomer iodine 0.9% Gel 1 Application(s) Topical daily  chlorhexidine 2% Cloths 1 Application(s) Topical daily  dextrose 5%. 1000 milliLiter(s) (50 mL/Hr) IV Continuous <Continuous>  dextrose 50% Injectable 12.5 Gram(s) IV Push once  dextrose 50% Injectable 25 Gram(s) IV Push once  dextrose 50% Injectable 25 Gram(s) IV Push once  heparin  Injectable 5000 Unit(s) SubCutaneous every 8 hours  insulin lispro (HumaLOG) corrective regimen sliding scale   SubCutaneous Before meals and at bedtime  meropenem  IVPB 500 milliGRAM(s) IV Intermittent every 12 hours  metoprolol tartrate 25 milliGRAM(s) Oral three times a day  saccharomyces boulardii 250 milliGRAM(s) Oral two times a day  sodium bicarbonate 650 milliGRAM(s) Oral three times a day  sodium chloride 0.45% 1000 milliLiter(s) (83 mL/Hr) IV Continuous <Continuous>  sodium chloride 0.9% lock flush 3 milliLiter(s) IV Push every 8 hours  tamsulosin 0.4 milliGRAM(s) Oral at bedtime    MEDICATIONS  (PRN):  acetaminophen   Tablet .. 650 milliGRAM(s) Oral every 6 hours PRN Temp greater or equal to 38C (100.4F)  dextrose 40% Gel 15 Gram(s) Oral once PRN Blood Glucose LESS THAN 70 milliGRAM(s)/deciliter  glucagon  Injectable 1 milliGRAM(s) IntraMuscular once PRN Glucose LESS THAN 70 milligrams/deciliter  ondansetron Injectable 4 milliGRAM(s) IV Push every 6 hours PRN Nausea JUAN MIGUEL PARADA    [id number]    85y      Male    CC: Fever, confusion, cough, Pneumonia (29 Mar 2019 23:10)    UTI, hx of esbl ecoli uti  transaminitis  elevated bun/cr  cholelithiasis  Acute metabolic encephalopthy    overnight events:  Was seen and examined at bedside with interpeter Brisa at bedside. Daughter and son in law present. Pt responds to name verbally, opens eyes periodically, follows simple commands with yes and no answers, closes eyes after. Denies any active complaints. Family made aware of worsening lab values, family meeting set to take place tomorrow afternoon with HCP present. GI saw patient also. To see palliative. SHAE showed EF < 20%, will consult cardiology    HPI:  84 y/o male sent from NH for above. 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 Patient was recently seen by Vascular for scheduled angiogram for ischemic non healing ulcer over left heel, however procedure was cancelled due to advanced CKD and risk of contrast nephropathy. Patient was at his usual baseline until over the course of the day he was noted to become more confused, coughing, and became hypoxic. He does have a hx of PNA as per family. He does not have any reported history of dysphagia on chart or by family. In the ED patient with RML infiltrate, fever, tachypnea, leukocytosis, and ruled in for sepsis.    ROS:  ROS:  Constitutional: No fevers, no chills, no fatigue  ENT/Mouth: No Hearing Changes, no ear pain, No Nasal Congestion  Cardiovascular: No Chest Pain, no palpitations  Respiratory: No Cough, No Sputum, No Wheezing, No Smoke Exposure, No Dyspnea   Gastrointestinal: No Nausea, No Vomiting, No Diarrhea, No Constipation, No Pain,   Genitourinary: no dysuria, no urinary frequency  Musculoskeletal: No pain, no weakness  Skin: No rash, No lesion  Neuro: No Weakness, No Numbness, No Paresthesias, No Loss of Consciousness  Psych: No Anxiety/Panic, No Depression, No Insomnia,   and confusionHeme/Lymph: No Bruising, No Bleeding:iculty to obtain due to pt medical condition     PHYSICAL EXAM:  General: Well developed; well nourished; sleepy, respond to verbal commands and follows simple commands  Eyes: PERRLA, EOMI; conjunctiva and sclera clear  Respiratory: rales in the right lower lung field  Cardiovascular: Regular rate and rhythm. S1 and S2 Normal;   Gastrointestinal: Soft non-tender non-distended; Normal bowel sounds  Genitourinary: No costovertebral angle tenderness  Extremities: Normal range of motion, No clubbing, cyanosis or edema  Vascular:  Left tma , healed surgical margins. left lateral healed ulceration painted with betadine.  no noted active drainage     Neurological: Alert and oriented x1    Vital Signs Last 24 Hrs  T(C): 37.2 (03 Apr 2019 07:36), Max: 37.2 (03 Apr 2019 07:36)  T(F): 98.9 (03 Apr 2019 07:36), Max: 98.9 (03 Apr 2019 07:36)  HR: 83 (03 Apr 2019 07:36) (70 - 83)  BP: 132/65 (03 Apr 2019 07:36) (132/65 - 148/82)  BP(mean): --  RR: 18 (03 Apr 2019 07:36) (18 - 18)  SpO2: 95% (03 Apr 2019 07:36) (95% - 96%)    MEDICATIONS  (STANDING):  aspirin enteric coated 81 milliGRAM(s) Oral daily  atorvastatin 40 milliGRAM(s) Oral at bedtime  cadexomer iodine 0.9% Gel 1 Application(s) Topical daily  chlorhexidine 2% Cloths 1 Application(s) Topical daily  dextrose 5%. 1000 milliLiter(s) (50 mL/Hr) IV Continuous <Continuous>  dextrose 50% Injectable 12.5 Gram(s) IV Push once  dextrose 50% Injectable 25 Gram(s) IV Push once  dextrose 50% Injectable 25 Gram(s) IV Push once  heparin  Injectable 5000 Unit(s) SubCutaneous every 8 hours  insulin lispro (HumaLOG) corrective regimen sliding scale   SubCutaneous Before meals and at bedtime  meropenem  IVPB 500 milliGRAM(s) IV Intermittent every 12 hours  metoprolol tartrate 25 milliGRAM(s) Oral three times a day  saccharomyces boulardii 250 milliGRAM(s) Oral two times a day  sodium bicarbonate 650 milliGRAM(s) Oral three times a day  sodium chloride 0.45% 1000 milliLiter(s) (83 mL/Hr) IV Continuous <Continuous>  sodium chloride 0.9% lock flush 3 milliLiter(s) IV Push every 8 hours  tamsulosin 0.4 milliGRAM(s) Oral at bedtime    MEDICATIONS  (PRN):  acetaminophen   Tablet .. 650 milliGRAM(s) Oral every 6 hours PRN Temp greater or equal to 38C (100.4F)  dextrose 40% Gel 15 Gram(s) Oral once PRN Blood Glucose LESS THAN 70 milliGRAM(s)/deciliter  glucagon  Injectable 1 milliGRAM(s) IntraMuscular once PRN Glucose LESS THAN 70 milligrams/deciliter  ondansetron Injectable 4 milliGRAM(s) IV Push every 6 hours PRN Nausea

## 2019-04-03 NOTE — PROGRESS NOTE ADULT - SUBJECTIVE AND OBJECTIVE BOX
NEPHROLOGY INTERVAL HPI/OVERNIGHT EVENTS:  Remains on 1/2 NS at 83 ml/hr   No post   ID and GI follow up noted   Abx adjustments noted   + Entresto remains   LFT's trending better       MEDICATIONS  (STANDING):  aspirin enteric coated 81 milliGRAM(s) Oral daily  atorvastatin 40 milliGRAM(s) Oral at bedtime  cadexomer iodine 0.9% Gel 1 Application(s) Topical daily  chlorhexidine 2% Cloths 1 Application(s) Topical daily  dextrose 5%. 1000 milliLiter(s) (50 mL/Hr) IV Continuous <Continuous>  dextrose 50% Injectable 12.5 Gram(s) IV Push once  dextrose 50% Injectable 25 Gram(s) IV Push once  dextrose 50% Injectable 25 Gram(s) IV Push once  heparin  Injectable 5000 Unit(s) SubCutaneous every 8 hours  insulin lispro (HumaLOG) corrective regimen sliding scale   SubCutaneous Before meals and at bedtime  meropenem  IVPB 500 milliGRAM(s) IV Intermittent every 12 hours  metoprolol succinate ER 50 milliGRAM(s) Oral daily  saccharomyces boulardii 250 milliGRAM(s) Oral two times a day  sacubitril 24 mG/valsartan 26 mG 1 Tablet(s) Oral two times a day  sodium bicarbonate 650 milliGRAM(s) Oral three times a day  sodium chloride 0.45% 1000 milliLiter(s) (83 mL/Hr) IV Continuous <Continuous>  sodium chloride 0.9% lock flush 3 milliLiter(s) IV Push every 8 hours  tamsulosin 0.4 milliGRAM(s) Oral at bedtime    MEDICATIONS  (PRN):  acetaminophen   Tablet .. 650 milliGRAM(s) Oral every 6 hours PRN Temp greater or equal to 38C (100.4F)  dextrose 40% Gel 15 Gram(s) Oral once PRN Blood Glucose LESS THAN 70 milliGRAM(s)/deciliter  glucagon  Injectable 1 milliGRAM(s) IntraMuscular once PRN Glucose LESS THAN 70 milligrams/deciliter  ondansetron Injectable 4 milliGRAM(s) IV Push every 6 hours PRN Nausea      Allergies    No Known Allergies    Intolerances          Vital Signs Last 24 Hrs  T(C): 36.4 (2019 15:19), Max: 37.2 (2019 07:36)  T(F): 97.6 (2019 15:19), Max: 98.9 (2019 07:36)  HR: 71 (2019 15:19) (71 - 83)  BP: 127/58 (2019 15:19) (127/58 - 148/82)  BP(mean): --  RR: 18 (2019 15:19) (18 - 18)  SpO2: 97% (2019 15:19) (95% - 97%)  Daily     Daily   I&O's Detail    2019 07:  -  2019 07:00  --------------------------------------------------------  IN:    sodium chloride 0.45%: 83 mL  Total IN: 83 mL    OUT:  Total OUT: 0 mL    Total NET: 83 mL      2019 07:  -  2019 17:04  --------------------------------------------------------  IN:    sodium chloride 0.45%: 830 mL  Total IN: 830 mL    OUT:  Total OUT: 0 mL    Total NET: 830 mL        I&O's Summary    2019 07:  -  2019 07:00  --------------------------------------------------------  IN: 83 mL / OUT: 0 mL / NET: 83 mL    2019 07:  -  2019 17:04  --------------------------------------------------------  IN: 830 mL / OUT: 0 mL / NET: 830 mL        PHYSICAL EXAM:    GENERAL: appears chronically ill  HEAD:  NCAT, dry mm  EYES: EOMI  NECK: Supple, neck  veins full  CHEST/LUNG: Clear to percussion bilaterally; No rales  HEART: Regular rate and rhythm; No murmurs  ABDOMEN: Soft, Nontender, Nondistended; Bowel sounds present  EXTREMITIES:  no edema    LABS:                        9.2    6.7   )-----------( 164      ( 2019 13:43 )             28.4     04-03    141  |  106  |  88.0<H>  ----------------------------<  179<H>  4.2   |  17.0<L>  |  3.76<H>    Ca    7.8<L>      2019 13:43  Phos  4.0     -  Mg     1.6     -    TPro  5.7<L>  /  Alb  2.5<L>  /  TBili  0.4  /  DBili  0.2  /  AST  168<H>  /  ALT  435<H>  /  AlkPhos  207<H>  -    PT/INR - ( 2019 13:43 )   PT: 18.2 sec;   INR: 1.56 ratio             Magnesium, Serum: 1.6 mg/dL ( @ 13:43)  Phosphorus Level, Serum: 4.0 mg/dL ( @ 13:43)    PROCEDURE DATE:  2019          INTERPRETATION:  Ultrasound of the abdomen       CLINICAL INFORMATION:       Worsening elevated LFTs.    TECHNIQUE:   Transcutaneous ultrasonography of the abdomen was performed.    FINDINGS:    CT chest 2017. available for review.    LEFT hepatic lobe cyst measures 1.4 x 1.4 x 1.2 cm.    The liver shows normal parenchymal echogenicity.    Hepatic size and contours are maintained.  Hepatic and portalveins   appear patent and are not displaced.  No intrahepatic or ductal   dilatation is found.  The common duct  measures 0.33 cm.       The gallbladder  contains punctate echogenic foci within the neck of the   gallbladder without shadowing concerning for gallstone. Lumbar is not   distended.  There is no gallbladder wall thickening.   No tenderness was elicited with direct compression by the transducer; no   sonographic Vargas's sign identified.      The pancreatic head, neck, and proximal bodyare intact. The midbody and   tail of pancreas are obscured by bowel gas.   The spleen size is normal.    The right kidney measures 9.7 cm in length.  It demonstrates no mass,   calculus or hydronephrosis.      The left kidney measures 10.0 cm in length.  It demonstrates no mass,   calculus or hydronephrosis.    Visualized segments of abdominal aorta are within normal limits by   sonographic criteria. IVC is unremarkable. No retroperitoneal mass seen.  There are bilateral moderate pleural effusions.     IMPRESSION: Suspect cholelithiasis without gallbladder distention or   positive Vargas sign.  Common bile duct normal.  Pack parenchyma aside from 1.5 cm LEFT hepatic lobe cyst unremarkable.  Bilateral pleural effusions.                RICHY KINGSLEY M.D., ATTENDING RADIOLOGIST  This document has been electronically signed. 2019 12:38PM       EXAM:  US PHYSIOL LWR EXT 3+ LEV BI                          PROCEDURE DATE:  2019          INTERPRETATION:  History:History of amputation of the left midfoot.   Nonhealing left heel ulcer. Left lower extremity pain. Prior smoker with   diabetes.    Technique: Bilateral AWILDA/PVR    Comparison: None     Findings:     RIGHT  AWILDA:      The vessels are calcified and noncompressible.  Flow study: Pulsatile waveforms from the high thigh through the calf with   diminished flow at the ankle and distally    LEFT  AWILDA: 0.45 . There is diminished pressure throughout the left lower   extremity. A significant pressure drop is seen between the upper thigh   and lower thigh compatible with intervening disease.  Flow study: Pulsatile waveforms are seen in the thigh, with diminished   flow in the calf and ankle.      Impression:     Ankle-brachial index on the right could not be determined as vessels are   calcified and noncompressible. There is good flow to the upper calf with   diminished flow at the ankle and distally.    Severe disease on the left, with ankle-brachial index less than 0.5   compatible with tissue loss, likely multifocal hemodynamically   significant disease. There is diminished pressure throughout the left   lower extremity, which could represent iliofemoral disease. There is also   diminished flow below the knee compatible with tibial arterial disease.                VU SWANSON M.D., ATTENDING RADIOLOGIST  This document has been electronically signed. Apr  3 2019  9:00AM                    EXAM:  ECHO TRANSTHORACIC COMP W DOPP      PROCEDURE DATE:  2019   .      INTERPRETATION:  REPORT:    TRANSTHORACIC ECHOCARDIOGRAM REPORT         Patient Name:   JUAN MIGUEL PARADA Patient Location: Inpatient  Medical Rec #:  [id number]           Accession #:      [id number]  Account #:                             Height:           68.1 in 173.0 cm  YOB: 1934               Weight:           158.7 lb 72.00 kg  Patient Age:    85 years               BSA:              1.85 m²  Patient Gender: M                      BP:               141/73 mmHg       Date of Exam:        2019 3:27:52 PM  Sonographer:         Morenita Mathur  Referring Physician: Idania Ball MD    Procedure:   2D Echo/Doppler/Color Doppler Complete and Echocardiogram   with               Definity Contrast.  Indications: Dyspnea, unspecified - R06.00  Diagnosis:   Cardiomyopathy, unspecified - I42.9; Hypertensive heart   disease               with heart failure - I11.0         2D AND M-MODE MEASUREMENTS (normal ranges within parentheses):  Left                 Normal   Aorta/Left            Normal  Ventricle:                    Atrium:  IVSd (2D):    1.32  (0.7-1.1) Aortic Root  3.50 cm (2.4-3.7)                 cm             (Mmode):  LVPWd(2D):   1.17  (0.7-1.1) AoV Cusp     1.40 cm (1.5-2.6)                 cm             Separation:  LVIDd (2D):   5.16  (3.4-5.7) LA Volume     46.6                 cm             Index         ml/m²  LVIDs (2D):   4.39            Right Ventricle:              cm             TAPSE:           1.20 cm  LV FS (2D):   14.9   (>25%)                  %  Relative Wall 0.45   (<0.42)  Thickness    LV SYSTOLIC FUNCTION BY 2D PLANIMETRY (MOD):  EF-A4C View: 12.0 % EF-A2C View: 29.9 % EF-Biplane: 22 %    LV DIASTOLIC FUNCTION:  MV Peak E: 0.97 m/s E/e' Ratio: 16.50  MV Peak A: 0.89 m/s Decel Time: 127 msec  E/A Ratio: 1.09    SPECTRAL DOPPLER ANALYSIS (where applicable):  Mitral Valve:  MV P1/2 Time: 36.83 msec  MV Area, PHT: 5.97 cm²    Mitral Insufficiency by PISA:  MR Volume:  MR Flow Rate: 67.63 ml/s MR EROA: 13.80 mm²    Aortic Valve: AoV Max Tayo: 1.19 m/s AoV Peak P.7 mmHg AoV Mean PG:   3.0 mmHg    LVOT Vmax: 0.66 m/s LVOT VTI: 0.137 m LVOT Diameter: 2.20 cm    AoV Area, Vmax: 2.11 cm² AoV Area, VTI: 2.10 cm² AoV Area, Vmn: 2.07 cm²  Ao VTI: 0.248  Tricuspid Valve and PA/RV Systolic Pressure: TR Max Velocity: 4.10 m/s RA   Pressure: 15 mmHg RVSP/PASP: 82.2 mmHg       PHYSICIAN INTERPRETATION:  Left Ventricle: Endocardial visualization was enhanced with intravenous   echo contrast. The left ventricular internal cavity size is normal.  Global LV systolic function was severely decreased. Left ventricular   ejection fraction, by visual estimation, is <20%. Elevated mean left   atrial pressure. Segmental wall motion abnormalities that appear worse in   apex. Aneurysmal apex.  There is normal rest perfusion in all territories except the distal LAD.  Findings are suggestive of non-ischemic + ischemic cardiomyopathy.  Right Ventricle: The right ventricular size is mildly enlarged. RV   systolic function is mildly reduced. TV S' 0.1 m/s.  Left Atrium: Moderately enlarged left atrium.  Right Atrium: Severely enlarged right atrium.  Pericardium: Trivial pericardial effusion is present. There is a moderate   pleural effusion in both left and right lateral regions.  Mitral Valve: The mitral valve leaflets are tethered which is due to   reduced systolic function and elevated LVDP. Moderate mitral valve   regurgitation is seen.  Tricuspid Valve: The tricuspid valve is not well seen. Moderate tricuspid   regurgitation is visualized. Estimated pulmonary artery systolic pressure   is 82.2 mmHg assuming a right atrial pressure of 15 mmHg, which is   consistent with severe pulmonaryhypertension.  Aortic Valve: The aortic valve is trileaflet. Peak Av velocity is 1.19   m/s, mean transaortic gradient equals 3.0 mmHg, the calculated aortic   valve area equals 2.10 cm² by the continuity equation consistent with   mild aortic stenosis. No evidence of aortic valve regurgitation is seen.  Pulmonic Valve: The pulmonic valve is normal. Trace pulmonic valve   regurgitation.  Aorta: The aortic root is normal in size and structure.  Pulmonary Artery: The pulmonary artery is not well seen. PVR = 5.6 woods   unit.  Venous: The inferior vena cava was dilated, with respiratory size   variation less than 50%.  Additional Comments: A pacer wire is visualized in the right atrium and   right ventricle.  In comparison to the previous echocardiogram(s): Prior examinations are   available and were reviewed for comparison purposes. Compared to study   from 2017, the LV function has significantly detiorated.       Summary:   1. Endocardial visualization was enhanced with intravenous echocontrast.   2. Moderately enlarged left atrium.   3. There is mild concentric left ventricular hypertrophy.   4. Segmental wall motion abnormalities that appear worse in apex.   Aneurysmal apex.      There is normal rest perfusion in all territories except the distal   LAD.      Findings are suggestive of non-ischemic + ischemic cardiomyopathy.   5. Left ventricular ejection fraction, by visual estimation, is <20%.   6. Elevated mean left atrial pressure. (LAP = 22 mm hg)   7. Severely enlarged right atrium.   8. The right ventricular size is normal. RV systolic function is mildly   reduced.   9. Moderate mitral valve regurgitation.  10. Moderate tricuspid regurgitation.  11. Estimated pulmonary artery systolic pressure is 82 mmHg-severe   pulmonary hypertension.  12. Trivial pericardial effusion.  13. Moderate pleural effusion in both left and right lateral regions.  14. Compared to study from 2017, the LV function has significantly   detiorated.    [id number] Manuel Gregorio MD, VI, Electronically signed on 2019 at   6:58:08 PM        EXAM:  XR CHEST PORTABLE IMMED 1V                          PROCEDURE DATE:  2019          INTERPRETATION:  Chest radiograph (one view)     CPT 76340    CLINICAL INFORMATION:  Patient is unable to communicate. Sepsis.  Short   of breath.     TECHNIQUE:  Single frontal view of the chest was obtained.    FINDINGS:  Prior study dated 2017 was available for review.    The lungs demonstrate increased pulmonary vascular congestion compatible   congestive heart failure. Small bilateral pleural effusions are noted.    The heart is difficult to evaluate. Pacemaker is noted.           IMPRESSION: Findings suggestive of congestive heart failure. Small   bilateral pleural effusions are noted.   Pacemaker present.                 DEIDRA TEE M.D., ATTENDING RADIOLOGIST  This document has been electronically signed. Mar 30 2019  8:26AM            *** Final ***      RADIOLOGY & ADDITIONAL TESTS:

## 2019-04-04 LAB
ALBUMIN SERPL ELPH-MCNC: 2.7 G/DL — LOW (ref 3.3–5.2)
ALP SERPL-CCNC: 194 U/L — HIGH (ref 40–120)
ALT FLD-CCNC: 376 U/L — HIGH
ANION GAP SERPL CALC-SCNC: 18 MMOL/L — HIGH (ref 5–17)
AST SERPL-CCNC: 153 U/L — HIGH
BILIRUB SERPL-MCNC: 0.4 MG/DL — SIGNIFICANT CHANGE UP (ref 0.4–2)
BUN SERPL-MCNC: 83 MG/DL — HIGH (ref 8–20)
CALCIUM SERPL-MCNC: 7.9 MG/DL — LOW (ref 8.6–10.2)
CHLORIDE SERPL-SCNC: 108 MMOL/L — HIGH (ref 98–107)
CO2 SERPL-SCNC: 18 MMOL/L — LOW (ref 22–29)
CREAT SERPL-MCNC: 3.94 MG/DL — HIGH (ref 0.5–1.3)
GLUCOSE BLDC GLUCOMTR-MCNC: 155 MG/DL — HIGH (ref 70–99)
GLUCOSE BLDC GLUCOMTR-MCNC: 167 MG/DL — HIGH (ref 70–99)
GLUCOSE BLDC GLUCOMTR-MCNC: 179 MG/DL — HIGH (ref 70–99)
GLUCOSE BLDC GLUCOMTR-MCNC: 186 MG/DL — HIGH (ref 70–99)
GLUCOSE SERPL-MCNC: 139 MG/DL — HIGH (ref 70–115)
HCT VFR BLD CALC: 29 % — LOW (ref 42–52)
HGB BLD-MCNC: 9.3 G/DL — LOW (ref 14–18)
MCHC RBC-ENTMCNC: 29.6 PG — SIGNIFICANT CHANGE UP (ref 27–31)
MCHC RBC-ENTMCNC: 32.1 G/DL — SIGNIFICANT CHANGE UP (ref 32–36)
MCV RBC AUTO: 92.4 FL — SIGNIFICANT CHANGE UP (ref 80–94)
PLATELET # BLD AUTO: 189 K/UL — SIGNIFICANT CHANGE UP (ref 150–400)
POTASSIUM SERPL-MCNC: 4.4 MMOL/L — SIGNIFICANT CHANGE UP (ref 3.5–5.3)
POTASSIUM SERPL-SCNC: 4.4 MMOL/L — SIGNIFICANT CHANGE UP (ref 3.5–5.3)
PROT SERPL-MCNC: 5.9 G/DL — LOW (ref 6.6–8.7)
RBC # BLD: 3.14 M/UL — LOW (ref 4.6–6.2)
RBC # FLD: 15.9 % — HIGH (ref 11–15.6)
SODIUM SERPL-SCNC: 144 MMOL/L — SIGNIFICANT CHANGE UP (ref 135–145)
WBC # BLD: 7.7 K/UL — SIGNIFICANT CHANGE UP (ref 4.8–10.8)
WBC # FLD AUTO: 7.7 K/UL — SIGNIFICANT CHANGE UP (ref 4.8–10.8)

## 2019-04-04 PROCEDURE — 99498 ADVNCD CARE PLAN ADDL 30 MIN: CPT

## 2019-04-04 PROCEDURE — 99497 ADVNCD CARE PLAN 30 MIN: CPT | Mod: 25

## 2019-04-04 PROCEDURE — 99223 1ST HOSP IP/OBS HIGH 75: CPT

## 2019-04-04 PROCEDURE — 99497 ADVNCD CARE PLAN 30 MIN: CPT | Mod: GC

## 2019-04-04 PROCEDURE — 99232 SBSQ HOSP IP/OBS MODERATE 35: CPT

## 2019-04-04 PROCEDURE — 99233 SBSQ HOSP IP/OBS HIGH 50: CPT | Mod: GC

## 2019-04-04 PROCEDURE — 99233 SBSQ HOSP IP/OBS HIGH 50: CPT

## 2019-04-04 RX ORDER — FOLIC ACID 0.8 MG
1 TABLET ORAL DAILY
Qty: 0 | Refills: 0 | Status: DISCONTINUED | OUTPATIENT
Start: 2019-04-04 | End: 2019-04-05

## 2019-04-04 RX ORDER — ONDANSETRON 8 MG/1
4 TABLET, FILM COATED ORAL THREE TIMES A DAY
Qty: 0 | Refills: 0 | Status: DISCONTINUED | OUTPATIENT
Start: 2019-04-04 | End: 2019-04-05

## 2019-04-04 RX ORDER — FENTANYL CITRATE 50 UG/ML
1 INJECTION INTRAVENOUS
Qty: 0 | Refills: 0 | Status: DISCONTINUED | OUTPATIENT
Start: 2019-04-04 | End: 2019-04-05

## 2019-04-04 RX ORDER — SENNA PLUS 8.6 MG/1
5 TABLET ORAL AT BEDTIME
Qty: 0 | Refills: 0 | Status: DISCONTINUED | OUTPATIENT
Start: 2019-04-04 | End: 2019-04-05

## 2019-04-04 RX ORDER — GABAPENTIN 400 MG/1
100 CAPSULE ORAL AT BEDTIME
Qty: 0 | Refills: 0 | Status: DISCONTINUED | OUTPATIENT
Start: 2019-04-04 | End: 2019-04-05

## 2019-04-04 RX ORDER — ERYTHROPOIETIN 10000 [IU]/ML
20000 INJECTION, SOLUTION INTRAVENOUS; SUBCUTANEOUS
Qty: 0 | Refills: 0 | Status: DISCONTINUED | OUTPATIENT
Start: 2019-04-04 | End: 2019-04-05

## 2019-04-04 RX ADMIN — SODIUM CHLORIDE 3 MILLILITER(S): 9 INJECTION INTRAMUSCULAR; INTRAVENOUS; SUBCUTANEOUS at 05:44

## 2019-04-04 RX ADMIN — MEROPENEM 100 MILLIGRAM(S): 1 INJECTION INTRAVENOUS at 05:43

## 2019-04-04 RX ADMIN — Medication 1 APPLICATION(S): at 11:44

## 2019-04-04 RX ADMIN — Medication 2: at 23:18

## 2019-04-04 RX ADMIN — TAMSULOSIN HYDROCHLORIDE 0.4 MILLIGRAM(S): 0.4 CAPSULE ORAL at 23:18

## 2019-04-04 RX ADMIN — ONDANSETRON 4 MILLIGRAM(S): 8 TABLET, FILM COATED ORAL at 23:18

## 2019-04-04 RX ADMIN — ERYTHROPOIETIN 20000 UNIT(S): 10000 INJECTION, SOLUTION INTRAVENOUS; SUBCUTANEOUS at 19:01

## 2019-04-04 RX ADMIN — Medication 50 MILLIGRAM(S): at 05:43

## 2019-04-04 RX ADMIN — FENTANYL CITRATE 1 PATCH: 50 INJECTION INTRAVENOUS at 13:44

## 2019-04-04 RX ADMIN — Medication 1 MILLIGRAM(S): at 17:25

## 2019-04-04 RX ADMIN — GABAPENTIN 100 MILLIGRAM(S): 400 CAPSULE ORAL at 13:53

## 2019-04-04 RX ADMIN — Medication 81 MILLIGRAM(S): at 11:44

## 2019-04-04 RX ADMIN — HEPARIN SODIUM 5000 UNIT(S): 5000 INJECTION INTRAVENOUS; SUBCUTANEOUS at 05:43

## 2019-04-04 RX ADMIN — SODIUM CHLORIDE 3 MILLILITER(S): 9 INJECTION INTRAMUSCULAR; INTRAVENOUS; SUBCUTANEOUS at 23:19

## 2019-04-04 RX ADMIN — Medication 2: at 11:44

## 2019-04-04 RX ADMIN — SODIUM CHLORIDE 3 MILLILITER(S): 9 INJECTION INTRAMUSCULAR; INTRAVENOUS; SUBCUTANEOUS at 13:53

## 2019-04-04 RX ADMIN — ATORVASTATIN CALCIUM 40 MILLIGRAM(S): 80 TABLET, FILM COATED ORAL at 23:18

## 2019-04-04 RX ADMIN — CHLORHEXIDINE GLUCONATE 1 APPLICATION(S): 213 SOLUTION TOPICAL at 11:45

## 2019-04-04 RX ADMIN — Medication 250 MILLIGRAM(S): at 05:43

## 2019-04-04 RX ADMIN — Medication 650 MILLIGRAM(S): at 13:55

## 2019-04-04 RX ADMIN — Medication 2: at 07:43

## 2019-04-04 RX ADMIN — GABAPENTIN 100 MILLIGRAM(S): 400 CAPSULE ORAL at 23:18

## 2019-04-04 RX ADMIN — HEPARIN SODIUM 5000 UNIT(S): 5000 INJECTION INTRAVENOUS; SUBCUTANEOUS at 13:52

## 2019-04-04 RX ADMIN — Medication 650 MILLIGRAM(S): at 05:43

## 2019-04-04 RX ADMIN — Medication 650 MILLIGRAM(S): at 23:18

## 2019-04-04 RX ADMIN — FENTANYL CITRATE 1 PATCH: 50 INJECTION INTRAVENOUS at 19:19

## 2019-04-04 RX ADMIN — SACUBITRIL AND VALSARTAN 1 TABLET(S): 24; 26 TABLET, FILM COATED ORAL at 05:43

## 2019-04-04 NOTE — CONSULT NOTE ADULT - ATTENDING COMMENTS
COUNSELING:  MEETING with family at 1200    Face to face meeting to discuss Advanced Care Planning - Time Spent ______ Minutes.  See goals of care note.    More than 50% time spent in counseling and coordinating care. ______ Minutes.     Thank you for the opportunity to assist with the care of this patient.   [facility] [phone].
Patient seen and examined earlier today.  A&P amended above.

## 2019-04-04 NOTE — PROGRESS NOTE ADULT - ASSESSMENT
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  Patient unable to provide history due to lethargy and dementia, history obtained from family (through  [facility]) and medical record.  Per medical record patient was recently seen by [facility] for scheduled angiogram for ischemic non healing ulcer over left heel, however procedure was cancelled due to advanced CKD and risk of contrast nephropathy. Patient was at his usual baseline until over the course of the day he was noted to become more confused, coughing, and became hypoxic. He does have a hx of PNA as per family. He does not have any reported history of dysphagia on chart or by family. In the ED patient with RML infiltrate, fever, tachypnea, leukocytosis, and ruled in for sepsis. Lactate normal, MAP/SBP normal range. Consult called for severe HFrEF <20 based on 4/2 TTE.  O2 sat 97% on RA. Patient does not exhibit any obvious signs of CHF.  Daughter and son in law at bedside, awaiting arrival of HCP (oldest daughter) Camillaabner Roberson to discuss plan of care with Palliative and Hospice.     Problem/Recommendation - 1:  Problem: HFrEF (heart failure with reduced ejection fraction). Recommendation: - TTE, EF <20% drastically deteriorated from 2017  - add Entresto 24/26 mg BID, for mild diuresis  - change Lopressor to Toprol XL 50mg QD, for better Heart Failure management  - no invasive procedures, such as cath,  at this time due to CKD (would commit him to hemodialysis if so)  - c/w medical management due to overall poor prognosis.     Problem/Recommendation - 2:  ·  Problem: Essential hypertension.  Recommendation: currently well controlled  continue current medications.      Problem/Recommendation - 3:  ·  Problem: Coronary artery disease involving native coronary artery of native heart without angina pectoris.  Recommendation: continue aspirin, beta blocker, statin  no invasive therapies at this time.     Being evaluated for hospice.

## 2019-04-04 NOTE — PROGRESS NOTE ADULT - SUBJECTIVE AND OBJECTIVE BOX
[facility]  INFECTIOUS DISEASES AND INTERNAL MEDICINE at [place]  =======================================================  Selvin El MD  Diplomates American Board of Internal Medicine and Infectious Diseases  =======================================================    MRN-[id number]  JUAN MIGUEL PARADA     Follow up: Fever and altered mental status    Afebrile now, seems alert and awake, some pain in left leg.     PAST MEDICAL & SURGICAL HISTORY:  Poor historian  Ulcer of foot: left heel  PVD (peripheral vascular disease)  Coronary heart disease  Chronic kidney disease  History of amputation of toe: every toe on left foot has been amputated  Pacemaker  PVD (peripheral vascular disease)  CRI (chronic renal insufficiency), stage 3 (moderate)  HTN (hypertension)  Diabetes  History of peripheral artery bypass: with vein graft and TMA in Aug 2017  History of amputation of toe: all toes on left foot have been amputated  Cardiac pacemaker: to right anterior chest wall    FAMILY HISTORY:  No pertinent family history in first degree relatives    Allergies  No Known Allergies    Antibiotics:  levoFLOXacin  Tablet 500 milliGRAM(s) Oral every 48 hours     REVIEW OF SYSTEMS:  as above     Physical Exam:  Vital Signs Last 24 Hrs  T(C): 36.8 (04 Apr 2019 09:02), Max: 37.2 (04 Apr 2019 00:04)  T(F): 98.2 (04 Apr 2019 09:02), Max: 99 (04 Apr 2019 00:04)  HR: 79 (04 Apr 2019 09:02) (71 - 79)  BP: 137/64 (04 Apr 2019 09:02) (119/59 - 137/64)  BP(mean): --  RR: 18 (04 Apr 2019 09:02) (18 - 18)  SpO2: 98% (04 Apr 2019 09:02) (96% - 98%)  GEN: NAD, lethargic but opens his eyes and answers questions (just received pain medication?)  HEENT: normocephalic and atraumatic. EOMI. PERRL.    NECK: Supple.  No lymphadenopathy   LUNGS: Clear to auscultation.  HEART: Regular rate and rhythm without murmur.  ABDOMEN: Soft, nontender, and nondistended.  Positive bowel sounds.    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   PSYCHIATRIC: Appropriate affect .  SKIN: as above.     Labs:  04-04    144  |  108<H>  |  83.0<H>  ----------------------------<  139<H>  4.4   |  18.0<L>  |  3.94<H>    Ca    7.9<L>      04 Apr 2019 06:54  Phos  4.0     04-03  Mg     1.6     04-03    TPro  5.9<L>  /  Alb  2.7<L>  /  TBili  0.4  /  DBili  x   /  AST  153<H>  /  ALT  376<H>  /  AlkPhos  194<H>  04-04                        9.3    7.7   )-----------( 189      ( 04 Apr 2019 06:54 )             29.0     PT/INR - ( 03 Apr 2019 13:43 )   PT: 18.2 sec;   INR: 1.56 ratio      LIVER FUNCTIONS - ( 04 Apr 2019 06:54 )  Alb: 2.7 g/dL / Pro: 5.9 g/dL / ALK PHOS: 194 U/L / ALT: 376 U/L / AST: 153 U/L / GGT: x           RECENT CULTURES:  04-02 @ 08:43 .Urine     <10,000 CFU/ml Gram Positive Cocci in Pairs and Chains    03-30 @ 08:52      NotDetec    03-29 @ 20:55 .Blood     No growth at 5 days.    All imaging and other data have been reviewed

## 2019-04-04 NOTE — DIETITIAN INITIAL EVALUATION ADULT. - PERTINENT LABORATORY DATA
04-04 Na144 mmol/L Glu 139 mg/dL<H> K+ 4.4 mmol/L Cr  3.94 mg/dL<H> BUN 83.0 mg/dL<H> Phos n/a   Alb 2.7 g/dL<L> PAB n/a

## 2019-04-04 NOTE — PROGRESS NOTE ADULT - SUBJECTIVE AND OBJECTIVE BOX
Patient is a 85y old  Male who presents with a chief complaint of Fever, confusion, cough, Pneumonia (03 Apr 2019 17:03)      HPI:  84 y/o male sent from NH for above. 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 Pt is seen at bedside, awake and alert. No abdominal pain. Tolerating breakfast        REVIEW OF SYSTEMS:  Constitutional: No fever, weight loss or fatigue  ENMT:  No difficulty hearing, tinnitus, vertigo; No sinus or throat pain  Respiratory: +cough,no  wheezing, chills or hemoptysis  Cardiovascular: No chest pain, palpitations, dizziness or leg swelling  Gastrointestinal: No abdominal or epigastric pain. No nausea, vomiting or hematemesis; No diarrhea or constipation. No melena or hematochezia.  Skin: No itching, burning, rashes or lesions   Musculoskeletal: No joint pain or swelling; No muscle, back or extremity pain    PAST MEDICAL & SURGICAL HISTORY:  Poor historian  Ulcer of foot: left heel  PVD (peripheral vascular disease)  Coronary heart disease  Chronic kidney disease  History of amputation of toe: every toe on left foot has been amputated  Pacemaker  PVD (peripheral vascular disease)  CRI (chronic renal insufficiency), stage 3 (moderate)  HTN (hypertension)  Diabetes  History of peripheral artery bypass: with vein graft and TMA in Aug 2017  History of amputation of toe: all toes on left foot have been amputated  Cardiac pacemaker: to right anterior chest wall      FAMILY HISTORY:  No pertinent family history in first degree relatives      SOCIAL HISTORY:  Smoking Status: [ ] Current, [ ] Former, [ ] Never  Pack Years:  [  ] EtOH-no    MEDICATIONS:  MEDICATIONS  (STANDING):  aspirin enteric coated 81 milliGRAM(s) Oral daily  atorvastatin 40 milliGRAM(s) Oral at bedtime  cadexomer iodine 0.9% Gel 1 Application(s) Topical daily  chlorhexidine 2% Cloths 1 Application(s) Topical daily  dextrose 5%. 1000 milliLiter(s) (50 mL/Hr) IV Continuous <Continuous>  dextrose 50% Injectable 12.5 Gram(s) IV Push once  dextrose 50% Injectable 25 Gram(s) IV Push once  dextrose 50% Injectable 25 Gram(s) IV Push once  heparin  Injectable 5000 Unit(s) SubCutaneous every 8 hours  insulin lispro (HumaLOG) corrective regimen sliding scale   SubCutaneous Before meals and at bedtime  meropenem  IVPB 500 milliGRAM(s) IV Intermittent every 12 hours  metoprolol succinate ER 50 milliGRAM(s) Oral daily  saccharomyces boulardii 250 milliGRAM(s) Oral two times a day  sacubitril 24 mG/valsartan 26 mG 1 Tablet(s) Oral two times a day  sodium bicarbonate 650 milliGRAM(s) Oral three times a day  sodium chloride 0.45% 1000 milliLiter(s) (83 mL/Hr) IV Continuous <Continuous>  sodium chloride 0.9% lock flush 3 milliLiter(s) IV Push every 8 hours  tamsulosin 0.4 milliGRAM(s) Oral at bedtime    MEDICATIONS  (PRN):  acetaminophen   Tablet .. 650 milliGRAM(s) Oral every 6 hours PRN Temp greater or equal to 38C (100.4F)  dextrose 40% Gel 15 Gram(s) Oral once PRN Blood Glucose LESS THAN 70 milliGRAM(s)/deciliter  glucagon  Injectable 1 milliGRAM(s) IntraMuscular once PRN Glucose LESS THAN 70 milligrams/deciliter  ondansetron Injectable 4 milliGRAM(s) IV Push every 6 hours PRN Nausea      Allergies    No Known Allergies    Intolerances        Vital Signs Last 24 Hrs  T(C): 37.2 (04 Apr 2019 00:04), Max: 37.2 (04 Apr 2019 00:04)  T(F): 99 (04 Apr 2019 00:04), Max: 99 (04 Apr 2019 00:04)  HR: 77 (04 Apr 2019 00:04) (71 - 77)  BP: 119/59 (04 Apr 2019 00:04) (119/59 - 127/58)  BP(mean): --  RR: 18 (04 Apr 2019 00:04) (18 - 18)  SpO2: 96% (04 Apr 2019 00:04) (96% - 97%)    04-03 @ 07:01  -  04-04 @ 07:00  --------------------------------------------------------  IN: 880 mL / OUT: 0 mL / NET: 880 mL          PHYSICAL EXAM:    General: Well developed; well nourished; in no acute distress  HEENT: MMM, conjunctiva and sclera clear  H- RRR  L- rhonchi B/L  Gastrointestinal: Soft, non-tender non-distended; Normal bowel sounds; No rebound or guarding  Extremities: Normal range of motion, No clubbing, cyanosis or edema  Neurological: Alert and oriented x3  Skin: Warm and dry. No obvious rash      LABS:                        9.3    7.7   )-----------( 189      ( 04 Apr 2019 06:54 )             29.0     04 Apr 2019 06:54    144    |  108    |  83.0   ----------------------------<  139    4.4     |  18.0   |  3.94     Ca    7.9        04 Apr 2019 06:54  Phos  4.0       03 Apr 2019 13:43  Mg     1.6       03 Apr 2019 13:43    TPro  5.9    /  Alb  2.7    /  TBili  0.4    /  DBili  x      /  AST  153    /  ALT  376    /  AlkPhos  194    / Amylase x      /Lipase x      04 Apr 2019 06:54              RADIOLOGY & ADDITIONAL STUDIES:     < from: Xray Chest 1 View-PORTABLE IMMEDIATE (03.29.19 @ 20:56) >  MPRESSION: Findings suggestive of congestive heart failure. Small   bilateral pleural effusions are noted.   Pacemaker present.       < end of copied text >

## 2019-04-04 NOTE — PROGRESS NOTE ADULT - SUBJECTIVE AND OBJECTIVE BOX
JUAN MIGUEL PARADA    [id number]    85y      Male      CC: Fever, confusion, cough, Pneumonia (29 Mar 2019 23:10)    Problem list:  UTI, hx of esbl ecoli uti  transaminitis  elevated bun/cr  cholelithiasis  Acute metabolic encephalopathy  Severely reduced EF <20%    Overnight events:  Was seen and examined at bedside with  Sandrita [id number]. Patient daughter at bedside.  Patient was awake and alert today, was c/o pain in RLE and mild pain in left.  Family meeting held today at 11:30-1pm. family decided dnr, dni, comfort measures only. sw and cm aware to work on dc planning.    HPI:  86 y/o male sent from NH for above. 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 Patient was recently seen by Vascular for scheduled angiogram for ischemic non healing ulcer over left heel, however procedure was cancelled due to advanced CKD and risk of contrast nephropathy. Patient was at his usual baseline until over the course of the day he was noted to become more confused, coughing, and became hypoxic. He does have a hx of PNA as per family. He does not have any reported history of dysphagia on chart or by family. In the ED patient with RML infiltrate, fever, tachypnea, leukocytosis, and ruled in for sepsis.    ROS:  negative except as above      Vital Signs Last 24 Hrs  T(C): 36.8 (04 Apr 2019 09:02), Max: 37.2 (04 Apr 2019 00:04)  T(F): 98.2 (04 Apr 2019 09:02), Max: 99 (04 Apr 2019 00:04)  HR: 79 (04 Apr 2019 09:02) (71 - 79)  BP: 137/64 (04 Apr 2019 09:02) (119/59 - 137/64)  BP(mean): --  RR: 18 (04 Apr 2019 09:02) (18 - 18)  SpO2: 98% (04 Apr 2019 09:02) (96% - 98%)    PHYSICAL EXAM:  General: awake and alert, fragile, on room air.  Eyes: PERRLA, EOMI; conjunctiva and sclera clear  Respiratory: rales in the right lower lung field  Cardiovascular: Regular rate and rhythm. S1 and S2 Normal;   Gastrointestinal: Soft non-tender non-distended; Normal bowel sounds  Genitourinary: No costovertebral angle tenderness  Extremities: Normal range of motion, No clubbing, cyanosis or edema  Vascular:  Left tma , healed surgical margins. left lateral healed ulceration covered with dressing.  Neurological: Alert and oriented x1        LABS:                        9.3    7.7   )-----------( 189      ( 04 Apr 2019 06:54 )             29.0     04-04    144  |  108<H>  |  83.0<H>  ----------------------------<  139<H>  4.4   |  18.0<L>  |  3.94<H>    Ca    7.9<L>      04 Apr 2019 06:54  Phos  4.0     04-03  Mg     1.6     04-03    TPro  5.9<L>  /  Alb  2.7<L>  /  TBili  0.4  /  DBili  x   /  AST  153<H>  /  ALT  376<H>  /  AlkPhos  194<H>  04-04    PT/INR - ( 03 Apr 2019 13:43 )   PT: 18.2 sec;   INR: 1.56 ratio                 MEDICATIONS  (STANDING):  aspirin enteric coated 81 milliGRAM(s) Oral daily  atorvastatin 40 milliGRAM(s) Oral at bedtime  cadexomer iodine 0.9% Gel 1 Application(s) Topical daily  dextrose 5%. 1000 milliLiter(s) (50 mL/Hr) IV Continuous <Continuous>  dextrose 50% Injectable 12.5 Gram(s) IV Push once  dextrose 50% Injectable 25 Gram(s) IV Push once  dextrose 50% Injectable 25 Gram(s) IV Push once  fentaNYL   Patch  12 MICROgram(s)/Hr 1 Patch Transdermal every 72 hours  gabapentin 100 milliGRAM(s) Oral at bedtime  insulin lispro (HumaLOG) corrective regimen sliding scale   SubCutaneous Before meals and at bedtime  metoprolol succinate ER 50 milliGRAM(s) Oral daily  ondansetron   Disintegrating Tablet 4 milliGRAM(s) Oral three times a day  senna Syrup 5 milliLiter(s) Oral at bedtime  sodium bicarbonate 650 milliGRAM(s) Oral three times a day  sodium chloride 0.9% lock flush 3 milliLiter(s) IV Push every 8 hours  tamsulosin 0.4 milliGRAM(s) Oral at bedtime    MEDICATIONS  (PRN):  acetaminophen   Tablet .. 650 milliGRAM(s) Oral every 6 hours PRN Temp greater or equal to 38C (100.4F)  dextrose 40% Gel 15 Gram(s) Oral once PRN Blood Glucose LESS THAN 70 milliGRAM(s)/deciliter  glucagon  Injectable 1 milliGRAM(s) IntraMuscular once PRN Glucose LESS THAN 70 milligrams/deciliter  ondansetron Injectable 4 milliGRAM(s) IV Push every 6 hours PRN Nausea      RADIOLOGY & ADDITIONAL TESTS: JUAN MIGUEL PARADA    [id number]    85y      Male      CC: Fever, confusion, cough, Pneumonia (29 Mar 2019 23:10)    Problem list:  UTI, hx of esbl ecoli uti  transaminitis  elevated bun/cr  cholelithiasis  Acute metabolic encephalopathy  Severely reduced EF <20%  severe pvd    Overnight events:  Was seen and examined at bedside with  Sandrita [id number]. Patient daughter at bedside.  Patient was awake and alert today, was c/o pain in RLE and mild pain in left.  Family meeting held today at 11:30-1pm. family decided dnr, dni, comfort measures only. sw and cm aware to work on dc planning.    HPI:  86 y/o male sent from NH for above. 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 Patient was recently seen by Vascular for scheduled angiogram for ischemic non healing ulcer over left heel, however procedure was cancelled due to advanced CKD and risk of contrast nephropathy. Patient was at his usual baseline until over the course of the day he was noted to become more confused, coughing, and became hypoxic. He does have a hx of PNA as per family. He does not have any reported history of dysphagia on chart or by family. In the ED patient with RML infiltrate, fever, tachypnea, leukocytosis, and ruled in for sepsis.    ROS:  negative except as above      Vital Signs Last 24 Hrs  T(C): 36.8 (04 Apr 2019 09:02), Max: 37.2 (04 Apr 2019 00:04)  T(F): 98.2 (04 Apr 2019 09:02), Max: 99 (04 Apr 2019 00:04)  HR: 79 (04 Apr 2019 09:02) (71 - 79)  BP: 137/64 (04 Apr 2019 09:02) (119/59 - 137/64)  BP(mean): --  RR: 18 (04 Apr 2019 09:02) (18 - 18)  SpO2: 98% (04 Apr 2019 09:02) (96% - 98%)    PHYSICAL EXAM:  General: awake and alert, fragile, on room air.  Eyes: PERRLA, EOMI; conjunctiva and sclera clear  Respiratory: rales in the right lower lung field  Cardiovascular: Regular rate and rhythm. S1 and S2 Normal;   Gastrointestinal: Soft non-tender non-distended; Normal bowel sounds  Genitourinary: No costovertebral angle tenderness  Extremities: Normal range of motion, No clubbing, cyanosis or edema  Vascular:  Left tma , healed surgical margins. left lateral healed ulceration covered with dressing.  Neurological: Alert and oriented x1        LABS:                        9.3    7.7   )-----------( 189      ( 04 Apr 2019 06:54 )             29.0     04-04    144  |  108<H>  |  83.0<H>  ----------------------------<  139<H>  4.4   |  18.0<L>  |  3.94<H>    Ca    7.9<L>      04 Apr 2019 06:54  Phos  4.0     04-03  Mg     1.6     04-03    TPro  5.9<L>  /  Alb  2.7<L>  /  TBili  0.4  /  DBili  x   /  AST  153<H>  /  ALT  376<H>  /  AlkPhos  194<H>  04-04    PT/INR - ( 03 Apr 2019 13:43 )   PT: 18.2 sec;   INR: 1.56 ratio                 MEDICATIONS  (STANDING):  aspirin enteric coated 81 milliGRAM(s) Oral daily  atorvastatin 40 milliGRAM(s) Oral at bedtime  cadexomer iodine 0.9% Gel 1 Application(s) Topical daily  dextrose 5%. 1000 milliLiter(s) (50 mL/Hr) IV Continuous <Continuous>  dextrose 50% Injectable 12.5 Gram(s) IV Push once  dextrose 50% Injectable 25 Gram(s) IV Push once  dextrose 50% Injectable 25 Gram(s) IV Push once  fentaNYL   Patch  12 MICROgram(s)/Hr 1 Patch Transdermal every 72 hours  gabapentin 100 milliGRAM(s) Oral at bedtime  insulin lispro (HumaLOG) corrective regimen sliding scale   SubCutaneous Before meals and at bedtime  metoprolol succinate ER 50 milliGRAM(s) Oral daily  ondansetron   Disintegrating Tablet 4 milliGRAM(s) Oral three times a day  senna Syrup 5 milliLiter(s) Oral at bedtime  sodium bicarbonate 650 milliGRAM(s) Oral three times a day  sodium chloride 0.9% lock flush 3 milliLiter(s) IV Push every 8 hours  tamsulosin 0.4 milliGRAM(s) Oral at bedtime    MEDICATIONS  (PRN):  acetaminophen   Tablet .. 650 milliGRAM(s) Oral every 6 hours PRN Temp greater or equal to 38C (100.4F)  dextrose 40% Gel 15 Gram(s) Oral once PRN Blood Glucose LESS THAN 70 milliGRAM(s)/deciliter  glucagon  Injectable 1 milliGRAM(s) IntraMuscular once PRN Glucose LESS THAN 70 milligrams/deciliter  ondansetron Injectable 4 milliGRAM(s) IV Push every 6 hours PRN Nausea      RADIOLOGY & ADDITIONAL TESTS:

## 2019-04-04 NOTE — DIETITIAN INITIAL EVALUATION ADULT. - NS AS NUTRI INTERV MEALS SNACK
Continue with current nutrition plan- RD to provide food preferences; Pt declined nutrition supplements

## 2019-04-04 NOTE — PROGRESS NOTE ADULT - ASSESSMENT
CRYSTAL on CKD ===> Baseline creat 0f 2.5 <--> 3.0   Severe CM with EF < 20   No hydro on renal imaging   Improving ischemic hepatitis ? , should hold statin for now   Watch on Entresto for now   Repeat CXR to reassess effusions   PAD with nonhealing heel ulcer   Dementia   PNA , neg urine Legionella   Given all issues , Palliative care evaluation is appropriate   I discussed with the family at bedside , they do not want HD     MA - Add Bicarb bid     Anemia - Add Epogen and folate CRYSTAL on CKD ===> Baseline creat 0f 2.5 <--> 3.0   Severe CM with EF < 20   No hydro on renal imaging   Improving ischemic hepatitis ? , should hold statin for now   Watch on Entresto for now   Repeat CXR to reassess effusions   PAD with nonhealing heel ulcer   Dementia   PNA , neg urine Legionella   Given all issues , Palliative care evaluation is appropriate   I discussed with the family at bedside , they do not want HD     MA - Added Bicarb     Anemia - Add Epogen and folate

## 2019-04-04 NOTE — CHART NOTE - NSCHARTNOTEFT_GEN_A_CORE
Upon Nutritional Assessment by the Registered Dietitian your patient was determined to meet criteria / has evidence of the following diagnosis/diagnoses:          [X]  Moderate Chronic Protein Calorie Malnutrition    Findings as based on:  •  Comprehensive nutrition assessment and consultation  •  Calorie counts (nutrient intake analysis)  •  Food acceptance and intake status from observations by staff  •  Follow up  •  Patient education  •  Intervention secondary to interdisciplinary rounds  •   concerns    Pt presents with malnutrition (moderate chronic) related to inability to consume adequate protein-energy in setting of Pna, HLD, HFrEF, AMS, insufficient renal function as evidenced by meeting <75% of estimated protein-energy needs >1mo, mild muscle wasting (temples, clavicle). Noted Pt with b/l pleural effusion secondary to HFrEF (EF<20%), possible weight loss masked by fluid accumulation. Aware comfort care measures only.    Treatment:    The following diet has been recommended:  RD to provide food preferences, consistency as tolerated   RD to respect Pt/family wishes regarding alternate means of nutrition     PROVIDER Section:     By signing this assessment you are acknowledging and agree with the diagnosis/diagnoses assigned by the Registered Dietitian    Comments:

## 2019-04-04 NOTE — GOALS OF CARE CONVERSATION - PERSONAL ADVANCE DIRECTIVE - NS PRO AD PATIENT TYPE
Health Care Proxy (HCP)/Do Not Resuscitate (DNR)
Health Care Proxy (HCP)/Do Not Resuscitate (DNR)/Medical Orders for Life-Sustaining Treatment (MOLST)

## 2019-04-04 NOTE — DIETITIAN INITIAL EVALUATION ADULT. - OTHER INFO
Pt admitted with Pna, CKD, HTN, HLD, PVD s/p L foot amp, HFrEF (EF <20%) with b/l pleural effusions, DM (7.5%) hx of dementia. Pt family at bedside aiding in interpreting, reports no change in weight and generally has a small appetite. Denies any difficulty chew/swallow. Pt is being followed by palliative care, family decided comfort care measures per MD note. RD to remain available.

## 2019-04-04 NOTE — CONSULT NOTE ADULT - ASSESSMENT
86 yo M recovering from HCAP, Sepsis,  Multiple Medical Co-morbidiites    Pneumonia  ID following   Antibiotics Meropenum IVPB    Heart Failure with reduced LV Function  EF < 20%   Echo much worse since 12/2017  B/L Pleural Effusions

## 2019-04-04 NOTE — CONSULT NOTE ADULT - REASON FOR ADMISSION
Fever, confusion, cough, Pneumonia

## 2019-04-04 NOTE — CONSULT NOTE ADULT - SUBJECTIVE AND OBJECTIVE BOX
This is an 86 yo frail elderly chronically ill Male, PMH Dementia,  CAD, PVD, CRI, PPM, HTN, DM. Admitted from [facility] with AMS, fever and Pneumonia.  He is being followed by ID for Pneumonia, Cardiology for Heart Failure, Renal for CKD 3,. He recently had a L Mid foot amputation and has a nonhealing heel ulcer.  Family at bedside. No fever, CP, Palpitations, N/V/D dizziness. Incontinent no post    CC: Lfoot and lower extremity pain- ischemic neuropathic pain. throbbing stabbing burning. He is sleeping, easily arouseable, had coffee and ate this morning.     HPI:  86 y/o male sent from NH for above. 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 Patient was recently seen by Vascular for scheduled angiogram for ischemic non healing ulcer over left heel, however procedure was cancelled due to advanced CKD and risk of contrast nephropathy. Patient was at his usual baseline until over the course of the day he was noted to become more confused, coughing, and became hypoxic. He does have a hx of PNA as per family. He does not have any reported history of dysphagia on chart or by family. In the ED patient with RML infiltrate, fever, tachypnea, leukocytosis, and ruled in for sepsis. Lactate normal, MAP/SBP normal range. (29 Mar 2019 23:10)      PERTINENT PMH REVIEWED: Yes     PAST MEDICAL & SURGICAL HISTORY:  Poor historian  Ulcer of foot: left heel  PVD (peripheral vascular disease)  Coronary heart disease  Chronic kidney disease  History of amputation of toe: every toe on left foot has been amputated  Pacemaker  PVD (peripheral vascular disease)  CRI (chronic renal insufficiency), stage 3 (moderate)  HTN (hypertension)  Diabetes  History of peripheral artery bypass: with vein graft and TMA in Aug 2017  History of amputation of toe: all toes on left foot have been amputated  Cardiac pacemaker: to right anterior chest wall      SOCIAL HISTORY:  EtOH    No                                    Drugs  No                           former  smoker                                      Admitted from: home  [facility]__HCP Daughter Camilla Nair Phone#:[phone]    FAMILY HISTORY:  No pertinent family history in first degree relatives    No Known Allergies    Baseline ADLs (prior to admission):   Dependent      Present Symptoms:     Dyspnea: 0    Nausea/Vomiting: No  Anxiety:  No  Depression:  No  Fatigue: Yes   Loss of appetite: Yes     Pain: L foot and lower extremity            Character-            Duration-            Effect-            Factors-            Frequency-            Location-            Severity-    Review of Systems: Reviewed                                     Unable to obtain due to poor mentation       MEDICATIONS  (STANDING):  aspirin enteric coated 81 milliGRAM(s) Oral daily  atorvastatin 40 milliGRAM(s) Oral at bedtime  cadexomer iodine 0.9% Gel 1 Application(s) Topical daily  chlorhexidine 2% Cloths 1 Application(s) Topical daily  dextrose 5%. 1000 milliLiter(s) (50 mL/Hr) IV Continuous <Continuous>  dextrose 50% Injectable 12.5 Gram(s) IV Push once  dextrose 50% Injectable 25 Gram(s) IV Push once  dextrose 50% Injectable 25 Gram(s) IV Push once  gabapentin 100 milliGRAM(s) Oral at bedtime  heparin  Injectable 5000 Unit(s) SubCutaneous every 8 hours  insulin lispro (HumaLOG) corrective regimen sliding scale   SubCutaneous Before meals and at bedtime  meropenem  IVPB 500 milliGRAM(s) IV Intermittent every 12 hours  metoprolol succinate ER 50 milliGRAM(s) Oral daily  saccharomyces boulardii 250 milliGRAM(s) Oral two times a day  sacubitril 24 mG/valsartan 26 mG 1 Tablet(s) Oral two times a day  sodium bicarbonate 650 milliGRAM(s) Oral three times a day  sodium chloride 0.45% 1000 milliLiter(s) (83 mL/Hr) IV Continuous <Continuous>  sodium chloride 0.9% lock flush 3 milliLiter(s) IV Push every 8 hours  tamsulosin 0.4 milliGRAM(s) Oral at bedtime    MEDICATIONS  (PRN):  acetaminophen   Tablet .. 650 milliGRAM(s) Oral every 6 hours PRN Temp greater or equal to 38C (100.4F)  dextrose 40% Gel 15 Gram(s) Oral once PRN Blood Glucose LESS THAN 70 milliGRAM(s)/deciliter  glucagon  Injectable 1 milliGRAM(s) IntraMuscular once PRN Glucose LESS THAN 70 milligrams/deciliter  ondansetron Injectable 4 milliGRAM(s) IV Push every 6 hours PRN Nausea      PHYSICAL EXAM:    Vital Signs Last 24 Hrs  T(C): 36.8 (04 Apr 2019 09:02), Max: 37.2 (04 Apr 2019 00:04)  T(F): 98.2 (04 Apr 2019 09:02), Max: 99 (04 Apr 2019 00:04)  HR: 79 (04 Apr 2019 09:02) (71 - 79)  BP: 137/64 (04 Apr 2019 09:02) (119/59 - 137/64)  BP(mean): --  RR: 18 (04 Apr 2019 09:02) (18 - 18)  SpO2: 98% (04 Apr 2019 09:02) (96% - 98%)    Alert oriented to self [place] Speaking with Dementia              frail thin      HEENT:   dry mouth      Lungs: comfortable     CV: normal      GI: normal  distended GI signed off              constipation  last BM:     :   incontinent      MSK:  weakness               bedbound/wheelchair bound    Skin:  pressure ulcers- non healing L heel ulcer  no rash    LABS:                        9.3    7.7   )-----------( 189      ( 04 Apr 2019 06:54 )             29.0     04-04    144  |  108<H>  |  83.0<H>  ----------------------------<  139<H>  4.4   |  18.0<L>  |  3.94<H>    Ca    7.9<L>      04 Apr 2019 06:54  Phos  4.0     04-03  Mg     1.6     04-03    TPro  5.9<L>  /  Alb  2.7<L>  /  TBili  0.4  /  DBili  x   /  AST  153<H>  /  ALT  376<H>  /  AlkPhos  194<H>  04-04    PT/INR - ( 03 Apr 2019 13:43 )   PT: 18.2 sec;   INR: 1.56 ratio         I&O's Summary    03 Apr 2019 07:01  -  04 Apr 2019 07:00  --------------------------------------------------------  IN: 880 mL / OUT: 0 mL / NET: 880 mL    RADIOLOGY & ADDITIONAL STUDIES:    ADVANCE DIRECTIVES:   DNR YES   Completed on:  12/2017                   MOLST  YES    Completed on: 12/2017  Living Will  NO   Completed on:

## 2019-04-04 NOTE — PROGRESS NOTE ADULT - ASSESSMENT
Assessment and Plan:   87 y/o male with history of MCI/early dementia, severe RLE PVD with chronic non-healing heel ulcer, CAD, HTN, HLD, DM-2, ICM with EF of 35% presents to the hospital with sepsis from HCAP. Blood culture been negative. Saturating normal at room air. Course complicated by elevated bun/cr, transaminitis, UTI and Repeat TTE showed severely reduced EF <20%. Cardiology consulted and recommended medical management given high risk and CKD. Patient is DNR/DNI. Family decided comfort measures only. SW AND CM aware for discharge planning.     Acute Metabolic encephalopathy:  secondary to UTI, Pneumonia, Transaminitis elevated bun/cr  GI, ID AND Nephrology note appreciated  As per GI elevated LFT due to ischemia from CHF <20 EF  ID mentioned Completed about 3 days for possible UTI. can stop Abx.     Complicated pneumonia:  HCAP (healthcare-associated pneumonia); likely gram negative pneumonia.    completed treatment    TRANSAMINITIS:  Likely due to ischemia, TTE showed (EF < 20%)  US abdomen showed cholelithiasis but no evidence of cholecystitis, with normal cbd  LFT trending down, GI signed off  Hepatitis profile negative      Acute Hypoxic respiratory failure; due to PNA.   Improved      SeverePVD (peripheral vascular disease).    AWILDA/PVR results discussed with patient family, vascular and podiatry involved.  Family decided comfort measures only, no surgical intervention.   - Cont. ASA, statin.       Type 2 diabetes mellitus with diabetic peripheral angiopathy without gangrene, with long-term current use of insulin.    HBAIC 7.5  - ADA diet, HISS, Accu checks AC/HS.     Arterial Ulcer of left heel, unspecified ulcer stage.   - plan as above. Family states patient didn't want any aggressive care for foot/ulcer.    CRYSTAL on CKD:  COMFORT MEAURES ONLY,  family decided no IV fluid given his low EF and risk of fluid overload  Avoid nephrotoxic drugs.   US renal showed no evidence of renal stone and hydronephrosis    Hypertension, unspecified type.  Plan: Cont. o/p regimen, dash diet.     Problem: HFrEF (heart failure with reduced ejection fraction).   Recommendation: - TTE, EF <20% drastically deteriorated from 2017  - change Lopressor to Toprol XL 50mg QD, for better Heart Failure management  - no invasive procedures, such as cath,  at this time due to CKD (would commit him to hemodialysis if so)  - c/w medical management due to overall poor prognosis.      ·  Problem: Coronary artery disease involving native coronary artery of native heart without angina pectoris.  Recommendation: continue aspirin, beta blocker, statin  no invasive therapies at this time.     GOALS of care;  Had extensive discussion with family members with Slovak interpretator Sandrita [phone] with Brett NP Betty Irvin  Family made aware of his underlying medical problems. Family decided comfort measures only, DNR/DNI  No IV fluids, no blood draws, no re hospitalization  60 min spent in discussing above discussion.  CM AND SW made aware of discharge planning. Assessment and Plan:   87 y/o male with history of MCI/early dementia, severe RLE PVD with chronic non-healing heel ulcer, CAD, HTN, HLD, DM-2, ICM with EF of 35% presents to the hospital with sepsis from HCAP. Blood culture been negative. Saturating normal at room air. Course complicated by elevated bun/cr, transaminitis, UTI and Repeat TTE showed severely reduced EF <20%. Cardiology consulted and recommended medical management given high risk and CKD. Patient is DNR/DNI. Family decided comfort measures only. SW AND CM aware for discharge planning.     Acute Metabolic encephalopathy:  secondary to UTI, Pneumonia, Transaminitis elevated bun/cr  GI, ID AND Nephrology note appreciated  As per GI elevated LFT due to ischemia from CHF <20 EF  ID mentioned Completed about 3 days for possible UTI. can stop Abx.     Complicated pneumonia:  HCAP (healthcare-associated pneumonia); likely gram negative pneumonia.    completed treatment    TRANSAMINITIS:  Likely due to ischemia, TTE showed (EF < 20%)  US abdomen showed cholelithiasis but no evidence of cholecystitis, with normal cbd  LFT trending down, GI signed off  Hepatitis profile negative      Acute Hypoxic respiratory failure; due to PNA.   Improved      SeverePVD (peripheral vascular disease).    AWILDA/PVR results discussed with patient family, vascular and podiatry involved.  Family decided comfort measures only, no surgical intervention.   - Cont. ASA, statin.       Type 2 diabetes mellitus with diabetic peripheral angiopathy without gangrene, with long-term current use of insulin.    HBAIC 7.5  - ADA diet, HISS, Accu checks AC/HS.     Arterial Ulcer of left heel, unspecified ulcer stage.   - plan as above. Family states patient didn't want any aggressive care for foot/ulcer.    CRYSTAL on CKD:  COMFORT MEAURES ONLY,  family decided no IV fluid given his low EF and risk of fluid overload  Avoid nephrotoxic drugs.   US renal showed no evidence of renal stone and hydronephrosis    Hypertension, unspecified type.  Plan: Cont. o/p regimen, dash diet.     Problem: HFrEF (heart failure with reduced ejection fraction).   Recommendation: - TTE, EF <20% drastically deteriorated from 2017  - change Lopressor to Toprol XL 50mg QD, for better Heart Failure management  - no invasive procedures, such as cath,  at this time due to CKD (would commit him to hemodialysis if so)  - c/w medical management due to overall poor prognosis.      ·  Problem: Coronary artery disease involving native coronary artery of native heart without angina pectoris.  Recommendation: continue aspirin, beta blocker, statin  no invasive therapies at this time.     GOALS of care;  Had extensive discussion with family members with Sri [place]ankan interpretator Sandrita [phone] with Brett NP Betty Irvin  Family made aware of his underlying medical problems. Family decided comfort measures only, DNR/DNI  No IV fluids, no blood draws, no re hospitalization  60 min spent in discussing above discussion.  Fentanyl patch and gabapentin added.  CM AND SW made aware of discharge planning.

## 2019-04-04 NOTE — GOALS OF CARE CONVERSATION - PERSONAL ADVANCE DIRECTIVE - CONVERSATION DETAILS
Hospice order noted  pt has only medicaid is long term  at [facility] spoke with González at [facility] who states that if family is agreeable  pt could have palliative and or comfort at [facility] . Hospice generally requires  medicare and medicaid  which pt does not have . pt at present does not appear to have active symptom management needs per medex  for inpt .
I spoke at length about worsening medical condition, multi organ failure.  poor circulation pain, non healing Left Heel wound.  Family asking for clarification regarding choosing Comfort Care at this time. How will [facility]  deliver these services. Medicaid only.    I was specific about "[place] effect" of Heart Failure and Diabetes can cause such pain and distress    I reviewed MOLST Directive using an  for entire conversation.  Provided [place] version of Advance Directive , reviewed each treatment choice, what would and woulkd not be done  to care for him, rationale x 2 given

## 2019-04-04 NOTE — PROGRESS NOTE ADULT - PROBLEM SELECTOR PLAN 1
- slightly better yesterday.  elevated LFt are likely due to passive congestion ( given EF less 20%  and renal failure . No further work up at this time. Will sign off

## 2019-04-04 NOTE — GOALS OF CARE CONVERSATION - PERSONAL ADVANCE DIRECTIVE - NS PRO AD PATIENT TYPE ON CHART
Power of  (POA) for Healthcare Issues
Medical Orders for Life-Sustaining Treatment (MOLST)/Power of  (POA) for Healthcare Issues/Do Not Resuscitate (DNR)

## 2019-04-04 NOTE — GOALS OF CARE CONVERSATION - PERSONAL ADVANCE DIRECTIVE - TREATMENT GUIDELINE COMMENT
Conservative, non escalating Care of acute symptoms and setbacks  Meeting 45 min. Used .  Family joined meeting late, had to review everything already said.

## 2019-04-04 NOTE — PROGRESS NOTE ADULT - SUBJECTIVE AND OBJECTIVE BOX
[facility]                                                        Office: [address] [place] [address]                                                       Telephone: [phone]. Fax:[phone]      CARDIOLOGY PROGRESS NOTE   ([facility])    Subjective: Patient seen and examined. Sitting up, drinking coffee. Family is bedside.      ROS: No headache, no chest pain, no SOB, no palpitations, no dizziness, no nausea, no bleeding    Chronic Conditions:     CURRENT MEDICATIONS  metoprolol succinate ER 50 milliGRAM(s) Oral daily  sacubitril 24 mG/valsartan 26 mG 1 Tablet(s) Oral two times a day  tamsulosin 0.4 milliGRAM(s) Oral at bedtime  meropenem  IVPB 500 milliGRAM(s) IV Intermittent every 12 hours  acetaminophen   Tablet .. 650 milliGRAM(s) Oral every 6 hours PRN  ondansetron Injectable 4 milliGRAM(s) IV Push every 6 hours PRN  atorvastatin 40 milliGRAM(s) Oral at bedtime  dextrose 40% Gel 15 Gram(s) Oral once PRN  dextrose 50% Injectable 12.5 Gram(s) IV Push once  dextrose 50% Injectable 25 Gram(s) IV Push once  dextrose 50% Injectable 25 Gram(s) IV Push once  glucagon  Injectable 1 milliGRAM(s) IntraMuscular once PRN  insulin lispro (HumaLOG) corrective regimen sliding scale   SubCutaneous Before meals and at bedtime  aspirin enteric coated 81 milliGRAM(s) Oral daily  cadexomer iodine 0.9% Gel 1 Application(s) Topical daily  chlorhexidine 2% Cloths 1 Application(s) Topical daily  dextrose 5%. 1000 milliLiter(s) IV Continuous <Continuous>  heparin  Injectable 5000 Unit(s) SubCutaneous every 8 hours  sodium bicarbonate 650 milliGRAM(s) Oral three times a day  sodium chloride 0.45% 1000 milliLiter(s) IV Continuous <Continuous>  sodium chloride 0.9% lock flush 3 milliLiter(s) IV Push every 8 hours  	  TELEMETRY:   Vitals:  T(C): 36.8 (04-04-19 @ 09:02), Max: 37.2 (04-04-19 @ 00:04)  HR: 79 (04-04-19 @ 09:02) (71 - 79)  BP: 137/64 (04-04-19 @ 09:02) (119/59 - 137/64)  RR: 18 (04-04-19 @ 09:02) (18 - 18)  SpO2: 98% (04-04-19 @ 09:02) (96% - 98%)  Wt(kg): --  I&O's Summary    03 Apr 2019 07:01  -  04 Apr 2019 07:00  --------------------------------------------------------  IN: 880 mL / OUT: 0 mL / NET: 880 mL    Daily T(C): 36.8 (04-04-19 @ 09:02), Max: 37.2 (04-04-19 @ 00:04)  HR: 79 (04-04-19 @ 09:02) (71 - 79)  BP: 137/64 (04-04-19 @ 09:02) (119/59 - 137/64)  RR: 18 (04-04-19 @ 09:02) (18 - 18)  SpO2: 98% (04-04-19 @ 09:02) (96% - 98%)  Wt(kg): --    Daily     PHYSICAL EXAM:  Appearance: NAD	  HEENT:   Normal oral mucosa, PERRL, EOMI	  Lymphatic: No lymphadenopathy  Cardiovascular: Normal S1 S2, No JVD, No murmurs, No edema  Respiratory: Lungs clear to auscultation	  Psychiatry: A & O x 3, Mood & affect appropriate  Gastrointestinal:  Soft, Non-tender, + BS	  Skin: No rashes, No ecchymoses, No cyanosis  Neurologic: Non-focal  Extremities: Normal range of motion, No clubbing, cyanosis or edema  Vascular: poor peripheral pulses; left foot amputation      ECG (tracing reviewed by me):  	    DIAGNOSTIC TESTING:  Echocardiogram (images reviewed by me): < from: TTE Echo Complete w/Doppler (04.02.19 @ 15:27) >  Summary:   1. Endocardial visualization was enhanced with intravenous echocontrast.   2. Moderately enlarged left atrium.   3. There is mild concentric left ventricular hypertrophy.   4. Segmental wall motion abnormalities that appear worse in apex.   Aneurysmal apex.      There is normal rest perfusion in all territories except the distal   LAD.      Findings are suggestive of non-ischemic + ischemic cardiomyopathy.   5. Left ventricular ejection fraction, by visual estimation, is <20%.   6. Elevated mean left atrial pressure. (LAP = 22 mm hg)   7. Severely enlarged right atrium.   8. The right ventricular size is normal. RV systolic function is mildly   reduced.   9. Moderate mitral valve regurgitation.  10. Moderate tricuspid regurgitation.  11. Estimated pulmonary artery systolic pressure is 82 mmHg-severe   pulmonary hypertension.  12. Trivial pericardial effusion.  13. Moderate pleural effusion in both left and right lateral regions.  14. Compared to study from 12/2017, the LV function has significantly   detiorated.    [id number] Manuel Gregorio MD, RPVI, Electronically signed on 4/2/2019 at   6:58:08 PM       < end of copied text >                         9.3    7.7   )-----------( 189      ( 04 Apr 2019 06:54 )             29.0     04-04    144  |  108<H>  |  83.0<H>  ----------------------------<  139<H>  4.4   |  18.0<L>  |  3.94<H>    Ca    7.9<L>      04 Apr 2019 06:54  Phos  4.0     04-03  Mg     1.6     04-03    TPro  5.9<L>  /  Alb  2.7<L>  /  TBili  0.4  /  DBili  x   /  AST  153<H>  /  ALT  376<H>  /  AlkPhos  194<H>  04-04    BNP: Serum Pro-Brain Natriuretic Peptide: >95431 pg/mL (04-03 @ 13:43)    HgA1c: Hemoglobin A1C, Whole Blood: 7.5 % (04-03 @ 13:43)

## 2019-04-04 NOTE — PROGRESS NOTE ADULT - ASSESSMENT
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   He has been started on ABx initially ceftriaxone and azithromycin and after 2 days switched to levaquin. Today even though on ABx had a very high WBC in urine and he has history of ESBL ecoli UTI multiple times in the past.     Pneumonia  UTI  DM  PVD and foot ulcer     - Blood culture neg  - UA with high WBC, UC with GPC <88725 CFU  - Renal USG neg   - Completed about 3 days for possible UTI. can stop Abx.   - Foot ulcer need wound care and podiatry/vascular follow up.   - Lungs most likely congested due to CHF, Pneumonia less likely.   - Neg legionella Urinary Ag  - Trend LFTs, Seen by GI, most likely related to congestion.   - Palliative care     Will follow.

## 2019-04-05 ENCOUNTER — TRANSCRIPTION ENCOUNTER (OUTPATIENT)
Age: 84
End: 2019-04-05

## 2019-04-05 VITALS
DIASTOLIC BLOOD PRESSURE: 60 MMHG | OXYGEN SATURATION: 95 % | SYSTOLIC BLOOD PRESSURE: 111 MMHG | RESPIRATION RATE: 18 BRPM | TEMPERATURE: 98 F | HEART RATE: 70 BPM

## 2019-04-05 LAB
ANION GAP SERPL CALC-SCNC: 14 MMOL/L — SIGNIFICANT CHANGE UP (ref 5–17)
BUN SERPL-MCNC: 79 MG/DL — HIGH (ref 8–20)
CALCIUM SERPL-MCNC: 8.2 MG/DL — LOW (ref 8.6–10.2)
CHLORIDE SERPL-SCNC: 106 MMOL/L — SIGNIFICANT CHANGE UP (ref 98–107)
CO2 SERPL-SCNC: 22 MMOL/L — SIGNIFICANT CHANGE UP (ref 22–29)
CREAT SERPL-MCNC: 3.96 MG/DL — HIGH (ref 0.5–1.3)
GLUCOSE BLDC GLUCOMTR-MCNC: 131 MG/DL — HIGH (ref 70–99)
GLUCOSE BLDC GLUCOMTR-MCNC: 237 MG/DL — HIGH (ref 70–99)
GLUCOSE SERPL-MCNC: 167 MG/DL — HIGH (ref 70–115)
HCT VFR BLD CALC: 30.6 % — LOW (ref 42–52)
HGB BLD-MCNC: 9.6 G/DL — LOW (ref 14–18)
MCHC RBC-ENTMCNC: 29.4 PG — SIGNIFICANT CHANGE UP (ref 27–31)
MCHC RBC-ENTMCNC: 31.4 G/DL — LOW (ref 32–36)
MCV RBC AUTO: 93.9 FL — SIGNIFICANT CHANGE UP (ref 80–94)
PLATELET # BLD AUTO: 174 K/UL — SIGNIFICANT CHANGE UP (ref 150–400)
POTASSIUM SERPL-MCNC: 4.7 MMOL/L — SIGNIFICANT CHANGE UP (ref 3.5–5.3)
POTASSIUM SERPL-SCNC: 4.7 MMOL/L — SIGNIFICANT CHANGE UP (ref 3.5–5.3)
RBC # BLD: 3.26 M/UL — LOW (ref 4.6–6.2)
RBC # FLD: 16.8 % — HIGH (ref 11–15.6)
SODIUM SERPL-SCNC: 142 MMOL/L — SIGNIFICANT CHANGE UP (ref 135–145)
WBC # BLD: 8.7 K/UL — SIGNIFICANT CHANGE UP (ref 4.8–10.8)
WBC # FLD AUTO: 8.7 K/UL — SIGNIFICANT CHANGE UP (ref 4.8–10.8)

## 2019-04-05 PROCEDURE — 87449 NOS EACH ORGANISM AG IA: CPT

## 2019-04-05 PROCEDURE — 36415 COLL VENOUS BLD VENIPUNCTURE: CPT

## 2019-04-05 PROCEDURE — 87633 RESP VIRUS 12-25 TARGETS: CPT

## 2019-04-05 PROCEDURE — 82947 ASSAY GLUCOSE BLOOD QUANT: CPT

## 2019-04-05 PROCEDURE — T1013: CPT

## 2019-04-05 PROCEDURE — 76775 US EXAM ABDO BACK WALL LIM: CPT

## 2019-04-05 PROCEDURE — 71045 X-RAY EXAM CHEST 1 VIEW: CPT

## 2019-04-05 PROCEDURE — 83735 ASSAY OF MAGNESIUM: CPT

## 2019-04-05 PROCEDURE — 84484 ASSAY OF TROPONIN QUANT: CPT

## 2019-04-05 PROCEDURE — 93306 TTE W/DOPPLER COMPLETE: CPT

## 2019-04-05 PROCEDURE — 99232 SBSQ HOSP IP/OBS MODERATE 35: CPT

## 2019-04-05 PROCEDURE — 80074 ACUTE HEPATITIS PANEL: CPT

## 2019-04-05 PROCEDURE — 87486 CHLMYD PNEUM DNA AMP PROBE: CPT

## 2019-04-05 PROCEDURE — 99285 EMERGENCY DEPT VISIT HI MDM: CPT | Mod: 25

## 2019-04-05 PROCEDURE — 87040 BLOOD CULTURE FOR BACTERIA: CPT

## 2019-04-05 PROCEDURE — 87640 STAPH A DNA AMP PROBE: CPT

## 2019-04-05 PROCEDURE — 85014 HEMATOCRIT: CPT

## 2019-04-05 PROCEDURE — 82962 GLUCOSE BLOOD TEST: CPT

## 2019-04-05 PROCEDURE — 93005 ELECTROCARDIOGRAM TRACING: CPT

## 2019-04-05 PROCEDURE — 99239 HOSP IP/OBS DSCHRG MGMT >30: CPT

## 2019-04-05 PROCEDURE — 80202 ASSAY OF VANCOMYCIN: CPT

## 2019-04-05 PROCEDURE — 96375 TX/PRO/DX INJ NEW DRUG ADDON: CPT

## 2019-04-05 PROCEDURE — 81001 URINALYSIS AUTO W/SCOPE: CPT

## 2019-04-05 PROCEDURE — 80053 COMPREHEN METABOLIC PANEL: CPT

## 2019-04-05 PROCEDURE — 82140 ASSAY OF AMMONIA: CPT

## 2019-04-05 PROCEDURE — 87581 M.PNEUMON DNA AMP PROBE: CPT

## 2019-04-05 PROCEDURE — 97163 PT EVAL HIGH COMPLEX 45 MIN: CPT

## 2019-04-05 PROCEDURE — 84132 ASSAY OF SERUM POTASSIUM: CPT

## 2019-04-05 PROCEDURE — 87798 DETECT AGENT NOS DNA AMP: CPT

## 2019-04-05 PROCEDURE — 87086 URINE CULTURE/COLONY COUNT: CPT

## 2019-04-05 PROCEDURE — 93923 UPR/LXTR ART STDY 3+ LVLS: CPT

## 2019-04-05 PROCEDURE — 83605 ASSAY OF LACTIC ACID: CPT

## 2019-04-05 PROCEDURE — 80076 HEPATIC FUNCTION PANEL: CPT

## 2019-04-05 PROCEDURE — 84145 PROCALCITONIN (PCT): CPT

## 2019-04-05 PROCEDURE — 84295 ASSAY OF SERUM SODIUM: CPT

## 2019-04-05 PROCEDURE — 82330 ASSAY OF CALCIUM: CPT

## 2019-04-05 PROCEDURE — 87641 MR-STAPH DNA AMP PROBE: CPT

## 2019-04-05 PROCEDURE — 83036 HEMOGLOBIN GLYCOSYLATED A1C: CPT

## 2019-04-05 PROCEDURE — 82803 BLOOD GASES ANY COMBINATION: CPT

## 2019-04-05 PROCEDURE — 85610 PROTHROMBIN TIME: CPT

## 2019-04-05 PROCEDURE — 80048 BASIC METABOLIC PNL TOTAL CA: CPT

## 2019-04-05 PROCEDURE — 85730 THROMBOPLASTIN TIME PARTIAL: CPT

## 2019-04-05 PROCEDURE — 85027 COMPLETE CBC AUTOMATED: CPT

## 2019-04-05 PROCEDURE — 82435 ASSAY OF BLOOD CHLORIDE: CPT

## 2019-04-05 PROCEDURE — 83880 ASSAY OF NATRIURETIC PEPTIDE: CPT

## 2019-04-05 PROCEDURE — 76700 US EXAM ABDOM COMPLETE: CPT

## 2019-04-05 PROCEDURE — 96374 THER/PROPH/DIAG INJ IV PUSH: CPT

## 2019-04-05 PROCEDURE — 84100 ASSAY OF PHOSPHORUS: CPT

## 2019-04-05 RX ORDER — SENNA PLUS 8.6 MG/1
5 TABLET ORAL
Qty: 0 | Refills: 0 | COMMUNITY
Start: 2019-04-05

## 2019-04-05 RX ORDER — CADEXOMER IODINE 0.9 %
1 PADS, MEDICATED (EA) TOPICAL
Qty: 0 | Refills: 0 | COMMUNITY
Start: 2019-04-05

## 2019-04-05 RX ORDER — FENTANYL CITRATE 50 UG/ML
1 INJECTION INTRAVENOUS
Qty: 0 | Refills: 0 | COMMUNITY
Start: 2019-04-05

## 2019-04-05 RX ORDER — ONDANSETRON 8 MG/1
1 TABLET, FILM COATED ORAL
Qty: 0 | Refills: 0 | COMMUNITY
Start: 2019-04-05

## 2019-04-05 RX ORDER — SODIUM BICARBONATE 1 MEQ/ML
1 SYRINGE (ML) INTRAVENOUS
Qty: 0 | Refills: 0 | COMMUNITY
Start: 2019-04-05

## 2019-04-05 RX ORDER — METOPROLOL TARTRATE 50 MG
1 TABLET ORAL
Qty: 0 | Refills: 0 | COMMUNITY

## 2019-04-05 RX ORDER — FOLIC ACID 0.8 MG
1 TABLET ORAL
Qty: 0 | Refills: 0 | COMMUNITY
Start: 2019-04-05

## 2019-04-05 RX ORDER — METOPROLOL TARTRATE 50 MG
1 TABLET ORAL
Qty: 0 | Refills: 0 | COMMUNITY
Start: 2019-04-05

## 2019-04-05 RX ADMIN — ONDANSETRON 4 MILLIGRAM(S): 8 TABLET, FILM COATED ORAL at 06:23

## 2019-04-05 RX ADMIN — Medication 4: at 12:04

## 2019-04-05 RX ADMIN — Medication 1 MILLIGRAM(S): at 12:03

## 2019-04-05 RX ADMIN — Medication 650 MILLIGRAM(S): at 13:54

## 2019-04-05 RX ADMIN — Medication 1 APPLICATION(S): at 12:03

## 2019-04-05 RX ADMIN — ONDANSETRON 4 MILLIGRAM(S): 8 TABLET, FILM COATED ORAL at 13:54

## 2019-04-05 RX ADMIN — Medication 650 MILLIGRAM(S): at 06:23

## 2019-04-05 RX ADMIN — SODIUM CHLORIDE 3 MILLILITER(S): 9 INJECTION INTRAMUSCULAR; INTRAVENOUS; SUBCUTANEOUS at 06:23

## 2019-04-05 RX ADMIN — Medication 81 MILLIGRAM(S): at 12:03

## 2019-04-05 RX ADMIN — SODIUM CHLORIDE 3 MILLILITER(S): 9 INJECTION INTRAMUSCULAR; INTRAVENOUS; SUBCUTANEOUS at 13:54

## 2019-04-05 RX ADMIN — Medication 50 MILLIGRAM(S): at 06:23

## 2019-04-05 NOTE — PROGRESS NOTE ADULT - SUBJECTIVE AND OBJECTIVE BOX
[facility]  INFECTIOUS DISEASES AND INTERNAL MEDICINE at [place]  =======================================================  Selvin El MD  Diplomates American Board of Internal Medicine and Infectious Diseases  =======================================================    [id number]  JUAN MIGUEL PARADA     Follow up: Fever and altered mental status    Afebrile now, seems alert and awake. Had a family meeting on 4/5, decided about comfort and palliative care.     PAST MEDICAL & SURGICAL HISTORY:  Poor historian  Ulcer of foot: left heel  PVD (peripheral vascular disease)  Coronary heart disease  Chronic kidney disease  History of amputation of toe: every toe on left foot has been amputated  Pacemaker  PVD (peripheral vascular disease)  CRI (chronic renal insufficiency), stage 3 (moderate)  HTN (hypertension)  Diabetes  History of peripheral artery bypass: with vein graft and TMA in Aug 2017  History of amputation of toe: all toes on left foot have been amputated  Cardiac pacemaker: to right anterior chest wall    FAMILY HISTORY:  No pertinent family history in first degree relatives    Allergies  No Known Allergies    Antibiotics:  None      REVIEW OF SYSTEMS:  as above     Physical Exam:  Vital Signs Last 24 Hrs  T(C): 36.8 (05 Apr 2019 08:02), Max: 37.9 (04 Apr 2019 15:03)  T(F): 98.2 (05 Apr 2019 08:02), Max: 100.2 (04 Apr 2019 15:03)  HR: 62 (05 Apr 2019 08:02) (62 - 76)  BP: 125/71 (05 Apr 2019 08:02) (125/71 - 139/74)  BP(mean): --  RR: 18 (05 Apr 2019 08:02) (18 - 18)  SpO2: 98% (05 Apr 2019 08:02) (98% - 99%)  GEN: NAD, lethargic but opens his eyes and answers questions (just received pain medication?)  HEENT: normocephalic and atraumatic. EOMI. PERRL.    NECK: Supple.  No lymphadenopathy   LUNGS: Clear to auscultation.  HEART: Regular rate and rhythm without murmur.  ABDOMEN: Soft, nontender, and nondistended.  Positive bowel sounds.    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   PSYCHIATRIC: Appropriate affect .  SKIN: as above.     Labs:  04-04    144  |  108<H>  |  83.0<H>  ----------------------------<  139<H>  4.4   |  18.0<L>  |  3.94<H>    Ca    7.9<L>      04 Apr 2019 06:54  Phos  4.0     04-03  Mg     1.6     04-03    TPro  5.9<L>  /  Alb  2.7<L>  /  TBili  0.4  /  DBili  x   /  AST  153<H>  /  ALT  376<H>  /  AlkPhos  194<H>  04-04                        9.3    7.7   )-----------( 189      ( 04 Apr 2019 06:54 )             29.0     PT/INR - ( 03 Apr 2019 13:43 )   PT: 18.2 sec;   INR: 1.56 ratio      LIVER FUNCTIONS - ( 04 Apr 2019 06:54 )  Alb: 2.7 g/dL / Pro: 5.9 g/dL / ALK PHOS: 194 U/L / ALT: 376 U/L / AST: 153 U/L / GGT: x           RECENT CULTURES:  04-02 @ 08:43 .Urine     <10,000 CFU/ml Gram Positive Cocci in Pairs and Chains    03-30 @ 08:52      NotDetec    03-29 @ 20:55 .Blood     No growth at 5 days.    All imaging and other data have been reviewed

## 2019-04-05 NOTE — PROGRESS NOTE ADULT - SUBJECTIVE AND OBJECTIVE BOX
JUAN MIGUEL PARADA    [id number]    85y      Male    CC: Fever, confusion, cough, Pneumonia (29 Mar 2019 23:10)    Problem list:  UTI, hx of esbl ecoli uti  transaminitis  elevated bun/cr  cholelithiasis  Acute metabolic encephalopathy  Severely reduced EF <20%  severe pvd        INTERVAL HPI/OVERNIGHT EVENTS:  was seen and examined at  bedside denied active complains. More alert today      Vital Signs Last 24 Hrs  T(C): 36.8 (05 Apr 2019 08:02), Max: 37.9 (04 Apr 2019 15:03)  T(F): 98.2 (05 Apr 2019 08:02), Max: 100.2 (04 Apr 2019 15:03)  HR: 62 (05 Apr 2019 08:02) (62 - 76)  BP: 125/71 (05 Apr 2019 08:02) (125/71 - 139/74)  BP(mean): --  RR: 18 (05 Apr 2019 08:02) (18 - 18)  SpO2: 98% (05 Apr 2019 08:02) (98% - 99%)    PHYSICAL EXAM:  General: awake and alert, fragile, on room air.  Eyes: PERRLA, EOMI; conjunctiva and sclera clear  Respiratory: rales in the right lower lung field  Cardiovascular: Regular rate and rhythm. S1 and S2 Normal;   Gastrointestinal: Soft non-tender non-distended; Normal bowel sounds  Genitourinary: No costovertebral angle tenderness  Extremities: Normal range of motion, No clubbing, cyanosis or edema  Vascular:  Left tma , healed surgical margins. left lateral healed ulceration covered with dressing.  Neurological: Alert and oriented x1    LABS:                        9.6    8.7   )-----------( 174      ( 05 Apr 2019 09:31 )             30.6     04-05    142  |  106  |  79.0<H>  ----------------------------<  167<H>  4.7   |  22.0  |  3.96<H>    Ca    8.2<L>      05 Apr 2019 09:31  Phos  4.0     04-03  Mg     1.6     04-03    TPro  5.9<L>  /  Alb  2.7<L>  /  TBili  0.4  /  DBili  x   /  AST  153<H>  /  ALT  376<H>  /  AlkPhos  194<H>  04-04    PT/INR - ( 03 Apr 2019 13:43 )   PT: 18.2 sec;   INR: 1.56 ratio                 MEDICATIONS  (STANDING):  aspirin enteric coated 81 milliGRAM(s) Oral daily  cadexomer iodine 0.9% Gel 1 Application(s) Topical daily  dextrose 5%. 1000 milliLiter(s) (50 mL/Hr) IV Continuous <Continuous>  dextrose 50% Injectable 12.5 Gram(s) IV Push once  dextrose 50% Injectable 25 Gram(s) IV Push once  dextrose 50% Injectable 25 Gram(s) IV Push once  epoetin maia Injectable 42261 Unit(s) SubCutaneous every 7 days  fentaNYL   Patch  12 MICROgram(s)/Hr 1 Patch Transdermal every 72 hours  folic acid 1 milliGRAM(s) Oral daily  gabapentin 100 milliGRAM(s) Oral at bedtime  insulin lispro (HumaLOG) corrective regimen sliding scale   SubCutaneous Before meals and at bedtime  metoprolol succinate ER 50 milliGRAM(s) Oral daily  ondansetron   Disintegrating Tablet 4 milliGRAM(s) Oral three times a day  senna Syrup 5 milliLiter(s) Oral at bedtime  sodium bicarbonate 650 milliGRAM(s) Oral three times a day  sodium chloride 0.9% lock flush 3 milliLiter(s) IV Push every 8 hours  tamsulosin 0.4 milliGRAM(s) Oral at bedtime    MEDICATIONS  (PRN):  acetaminophen   Tablet .. 650 milliGRAM(s) Oral every 6 hours PRN Temp greater or equal to 38C (100.4F)  dextrose 40% Gel 15 Gram(s) Oral once PRN Blood Glucose LESS THAN 70 milliGRAM(s)/deciliter  glucagon  Injectable 1 milliGRAM(s) IntraMuscular once PRN Glucose LESS THAN 70 milligrams/deciliter                  35 min spent in discharging the patient

## 2019-04-05 NOTE — DISCHARGE NOTE NURSING/CASE MANAGEMENT/SOCIAL WORK - NSDCPEPT PROEDHF_GEN_ALL_CORE
Report signs and symptoms to primary care provider/Call primary care provider for follow up after discharge/Low salt diet/Monitor weight daily/Activities as tolerated

## 2019-04-05 NOTE — PROGRESS NOTE ADULT - ASSESSMENT
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   He has been started on ABx initially ceftriaxone and azithromycin and after 2 days switched to levaquin. Since yesterday family have decided about comfort care. No more Antibiotics.     Pneumonia  UTI  DM  PVD and foot ulcer     - Blood culture neg  - UA with high WBC, UC with GPC <09439 CFU  - Renal USG neg   - Completed about 3 days of antibiotics for possible UTI.  - Wound care for foot ulcer.   - Lungs most likely congested due to CHF, Pneumonia less likely.   - Neg legionella Urinary Ag  - Trend LFTs, Seen by GI, most likely related to congestion.   - Palliative care     Will sign off please call with any question.

## 2019-04-05 NOTE — PROGRESS NOTE ADULT - ASSESSMENT
87 y/o male with history of MCI/early dementia, severe RLE PVD with chronic non-healing heel ulcer, CAD, HTN, HLD, DM-2, ICM with EF of 35% presents to the hospital with sepsis from HCAP. Blood culture been negative. Saturating normal at room air. Course complicated by elevated bun/cr, transaminitis, UTI and Repeat TTE showed severely reduced EF <20%. Cardiology consulted and recommended medical management given high risk and CKD. Patient is DNR/DNI. Family decided comfort measures only, Do not rehospitalize, no IV fluids. Patient is medically ready for discharge today.    Acute Metabolic encephalopathy:  secondary to UTI, Pneumonia, Transaminitis elevated bun/cr  GI, ID AND Nephrology note appreciated  As per GI elevated LFT due to ischemia from CHF <20 EF  ID mentioned Completed about 3 days for possible UTI. can stop Abx.     Complicated pneumonia:  HCAP (healthcare-associated pneumonia); likely gram negative pneumonia.    completed treatment    TRANSAMINITIS:  Likely due to ischemia, TTE showed (EF < 20%)  US abdomen showed cholelithiasis but no evidence of cholecystitis, with normal cbd  LFT trending down, GI signed off  Hepatitis profile negative  statin on hold      Acute Hypoxic respiratory failure; due to PNA.   Improved      SeverePVD (peripheral vascular disease).    AWILDA/PVR results discussed with patient family, vascular and podiatry involved.  Family decided comfort measures only, no surgical intervention.   - Cont. ASA,       Type 2 diabetes mellitus with diabetic peripheral angiopathy without gangrene, with long-term current use of insulin.    HBAIC 7.5  - ADA diet, HISS, Accu checks AC/HS.   insulin decreased to lantus 5 U    Arterial Ulcer of left heel, unspecified ulcer stage.   - plan as above. Family states patient didn't want any aggressive care for foot/ulcer.    CRYSTAL on CKD:  COMFORT MEAURES ONLY, Family do not want HD  family decided no IV fluid given his low EF and risk of fluid overload  Avoid nephrotoxic drugs.   US renal showed no evidence of renal stone and hydronephrosis    Hypertension, unspecified type.  Plan: Cont. o/p regimen, dash diet.     Problem: HFrEF (heart failure with reduced ejection fraction).   Recommendation: - TTE, EF <20% drastically deteriorated from 2017  - change Lopressor to Toprol XL 50mg QD, for better Heart Failure management  - no invasive procedures, such as cath,  at this time due to CKD (would commit him to hemodialysis if so)  - c/w medical management due to overall poor prognosis.      ·  Problem: Coronary artery disease involving native coronary artery of native heart without angina pectoris.  Recommendation: continue aspirin, beta blocker, statin  no invasive therapies at this time.     GOALS of care;   Family decided comfort measures only, DNR/DNI  No IV fluids, no blood draws, no re hospitalization

## 2019-04-05 NOTE — PROGRESS NOTE ADULT - REASON FOR ADMISSION
Fever, confusion, cough, Pneumonia

## 2019-04-05 NOTE — PROGRESS NOTE ADULT - ASSESSMENT
CRYSTAL on CKD ===> Baseline creat 3.0   Severe CM with EF < 20   No hydro on renal imaging    Dementia   PNA , neg urine Legionella   - off Entresto and diuretics for now  - family not agreeable to HD if needed  - palliative care     Anemia - Added Epogen and folate

## 2019-04-05 NOTE — PROGRESS NOTE ADULT - SUBJECTIVE AND OBJECTIVE BOX
NEPHROLOGY INTERVAL HPI/OVERNIGHT EVENTS:  pt comfortable when seen earlier  no cp, sob, n/v/d; tolerating breakfast  Wife at bedside    MEDICATIONS  (STANDING):  aspirin enteric coated 81 milliGRAM(s) Oral daily  cadexomer iodine 0.9% Gel 1 Application(s) Topical daily  dextrose 5%. 1000 milliLiter(s) (50 mL/Hr) IV Continuous <Continuous>  dextrose 50% Injectable 12.5 Gram(s) IV Push once  dextrose 50% Injectable 25 Gram(s) IV Push once  dextrose 50% Injectable 25 Gram(s) IV Push once  epoetin maia Injectable 68732 Unit(s) SubCutaneous every 7 days  fentaNYL   Patch  12 MICROgram(s)/Hr 1 Patch Transdermal every 72 hours  folic acid 1 milliGRAM(s) Oral daily  gabapentin 100 milliGRAM(s) Oral at bedtime  insulin lispro (HumaLOG) corrective regimen sliding scale   SubCutaneous Before meals and at bedtime  metoprolol succinate ER 50 milliGRAM(s) Oral daily  ondansetron   Disintegrating Tablet 4 milliGRAM(s) Oral three times a day  senna Syrup 5 milliLiter(s) Oral at bedtime  sodium bicarbonate 650 milliGRAM(s) Oral three times a day  sodium chloride 0.9% lock flush 3 milliLiter(s) IV Push every 8 hours  tamsulosin 0.4 milliGRAM(s) Oral at bedtime    MEDICATIONS  (PRN):  acetaminophen   Tablet .. 650 milliGRAM(s) Oral every 6 hours PRN Temp greater or equal to 38C (100.4F)  dextrose 40% Gel 15 Gram(s) Oral once PRN Blood Glucose LESS THAN 70 milliGRAM(s)/deciliter  glucagon  Injectable 1 milliGRAM(s) IntraMuscular once PRN Glucose LESS THAN 70 milligrams/deciliter      Allergies    No Known Allergies          Vital Signs Last 24 Hrs  T(C): 36.8 (05 Apr 2019 08:02), Max: 37.9 (04 Apr 2019 15:03)  T(F): 98.2 (05 Apr 2019 08:02), Max: 100.2 (04 Apr 2019 15:03)  HR: 62 (05 Apr 2019 08:02) (62 - 76)  BP: 125/71 (05 Apr 2019 08:02) (125/71 - 139/74)  BP(mean): --  RR: 18 (05 Apr 2019 08:02) (18 - 18)  SpO2: 98% (05 Apr 2019 08:02) (98% - 99%)    PHYSICAL EXAM:  GENERAL: Frail  EYES: EOMI  NECK: Supple, +jvd  CHEST/LUNG: Clear with diminished BS  HEART: Regular rate and rhythm; No rub  ABDOMEN: Soft, Nontender, Nondistended; Bowel sounds present  EXTREMITIES:  no edema    LABS:                        9.6    8.7   )-----------( 174      ( 05 Apr 2019 09:31 )             30.6     04-05    142  |  106  |  79.0<H>  ----------------------------<  167<H>  4.7   |  22.0  |  3.96<H>    Ca    8.2<L>      05 Apr 2019 09:31  Phos  4.0     04-03  Mg     1.6     04-03    TPro  5.9<L>  /  Alb  2.7<L>  /  TBili  0.4  /  DBili  x   /  AST  153<H>  /  ALT  376<H>  /  AlkPhos  194<H>  04-04    PT/INR - ( 03 Apr 2019 13:43 )   PT: 18.2 sec;   INR: 1.56 ratio                 RADIOLOGY & ADDITIONAL TESTS:  < from: US Abdomen Complete (04.02.19 @ 11:45) >     EXAM:  US ABDOMEN COMPLETE                          PROCEDURE DATE:  04/02/2019          INTERPRETATION:  Ultrasound of the abdomen       CLINICAL INFORMATION:       Worsening elevated LFTs.    TECHNIQUE:   Transcutaneous ultrasonography of the abdomen was performed.    FINDINGS:    CT chest 12/26/2017. available for review.    LEFT hepatic lobe cyst measures 1.4 x 1.4 x 1.2 cm.    The liver shows normal parenchymal echogenicity.    Hepatic size and contours are maintained.  Hepatic and portalveins   appear patent and are not displaced.  No intrahepatic or ductal   dilatation is found.  The common duct  measures 0.33 cm.       The gallbladder  contains punctate echogenic foci within the neck of the   gallbladder without shadowing concerning for gallstone. Lumbar is not   distended.  There is no gallbladder wall thickening.   No tenderness was elicited with direct compression by the transducer; no   sonographic Vargas's sign identified.      The pancreatic head, neck, and proximal bodyare intact. The midbody and   tail of pancreas are obscured by bowel gas.   The spleen size is normal.    The right kidney measures 9.7 cm in length.  It demonstrates no mass,   calculus or hydronephrosis.      The left kidney measures 10.0 cm in length.  It demonstrates no mass,   calculus or hydronephrosis.    Visualized segments of abdominal aorta are within normal limits by   sonographic criteria. IVC is unremarkable. No retroperitoneal mass seen.  There are bilateral moderate pleural effusions.     IMPRESSION: Suspect cholelithiasis without gallbladder distention or   positive Vargas sign.  Common bile duct normal.  Pack parenchyma aside from 1.5 cm LEFT hepatic lobe cyst unremarkable.  Bilateral pleural effusions.    < end of copied text >

## 2019-04-05 NOTE — PROGRESS NOTE ADULT - PROVIDER SPECIALTY LIST ADULT
Cardiology
Gastroenterology
Gastroenterology
Hospitalist
Infectious Disease
Infectious Disease
Nephrology
Nephrology
Podiatry
Infectious Disease
Nephrology

## 2019-04-05 NOTE — DISCHARGE NOTE NURSING/CASE MANAGEMENT/SOCIAL WORK - NSDCDPATPORTLINK_GEN_ALL_CORE
You can access the [facility], offered by [facility], by registering with the following website: [email or web address]

## 2019-05-09 NOTE — PHYSICAL THERAPY INITIAL EVALUATION ADULT - STANDING BALANCE: DYNAMIC, REHAB EVAL
poor plus Complex Repair And W Plasty Text: The defect edges were debeveled with a #15 scalpel blade.  The primary defect was closed partially with a complex linear closure.  Given the location of the remaining defect, shape of the defect and the proximity to free margins a W plasty was deemed most appropriate for complete closure of the defect.  Using a sterile surgical marker, an appropriate advancement flap was drawn incorporating the defect and placing the expected incisions within the relaxed skin tension lines where possible.    The area thus outlined was incised deep to adipose tissue with a #15 scalpel blade.  The skin margins were undermined to an appropriate distance in all directions utilizing iris scissors.

## 2019-10-18 NOTE — CDI QUERY NOTE - NSCDIOTHERTXTBX_GEN_ALL_CORE_HH
-- follows with cardiology   -- Controlled  -- Blood pressure goals discussed with patient   H&P- "Patient was at his usual baseline until over the course of the day he was noted to become more confused, coughing, and became hypoxic."  87 y/o male with HCAP, Sepsis, hypoxia    4/1 Hospitalist note- Was seen and examined at bedside, he was sleeping this am, and he open eyes by verbal commands, follow simple commands. Patient is poor historian.  daughter at bedside, he is not acting as usual. As per RN patient was confused last night and didn't slept last night. As per daughter patient had frequent UTI. UA was sent and was positive    Please provide a diagnosis associated with AMS and infection if clinically significant    1) Acute metabolic encephalopathy POA  2)  Acute toxic metabolic encephalopathy POA  3) Other  4) Not clinically significant

## 2019-12-31 NOTE — ED ADULT TRIAGE NOTE - MODE OF ARRIVAL
What Type Of Note Output Would You Prefer (Optional)?: Standard Output What Is The Reason For Today's Visit?: Full Body Skin Examination What Is The Reason For Today's Visit? (Being Monitored For X): concerning skin lesions on an annual basis EMS

## 2020-11-17 NOTE — DISCHARGE NOTE ADULT - FUNCTIONAL STATUS DATE
Called patient to schedule for genetics.  Left voicemail for patient to return call to schedule appointment.  
29-Dec-2017

## 2021-01-13 NOTE — H&P ADULT - AS BP NONINV METHOD
electronic
Detail Level: Detailed
Quality 130: Documentation Of Current Medications In The Medical Record: Current Medications Documented

## 2022-01-12 NOTE — H&P ADULT - GENERAL
The patient presents for f/u appt.  h/o abnormal liver enzymes, stable on previous checks.  h/o diabetes, states last a1c was in the 9's.  denies new gi complaints including abd pain, jaundice, weight loss, etc.
details…

## 2022-06-25 NOTE — ED ADULT NURSE NOTE - FALL HARM RISK TYPE OF ASSESSMENT
Admission Orbicularis Oris Muscle Flap Text: The defect edges were debeveled with a #15 scalpel blade.  Given that the defect affected the competency of the oral sphincter an orbicularis oris muscle flap was deemed most appropriate to restore this competency and normal muscle function.  Using a sterile surgical marker, an appropriate flap was drawn incorporating the defect. The area thus outlined was incised with a #15 scalpel blade.

## 2022-07-28 NOTE — DIETITIAN INITIAL EVALUATION ADULT. - PROBLEM SELECTOR PLAN 3
Adderall xr Pending    Insurance response  Prescription Drug Insurance: Medicaid  Notes: Emailed prior authorization request to Joanne for signature. Please sign and fax to [facility] [phone], and they will complete the PA.     Please update encounter when faxed to pharmacy. Thank you!        due to PNA. cont. plan as above, supplemental 02

## 2023-05-03 NOTE — DISCHARGE NOTE ADULT - NS AS DC FU INST LIST INST
Has bleeding at times and loose BMs  Continue current meds per Dr Joi Cartagena  Last colonoscopy was in July 2022  yes

## 2023-10-23 NOTE — ED ADULT NURSE NOTE - NURSING SKIN WOUND APPEAR #1
dry drainage, + nectotic Cantharidin Pregnancy And Lactation Text: This medication has not been proven safe during pregnancy. It is unknown if this medication is excreted in breast milk.

## 2024-04-23 NOTE — PATIENT PROFILE ADULT. - PHONE #
Pt [facility] c/o knee pain. Pt seen here for same yesterday.   
[phone]

## 2024-06-20 NOTE — H&P ADULT - MENTAL STATUS
Chief Complaint   Patient presents with    Port Draw     Vitals taken, port accessed, labs drawn, heparin locked, checked into next appt     /71 (BP Location: Left arm, Patient Position: Sitting, Cuff Size: Adult Large)   Pulse 88   Temp 98.3  F (36.8  C) (Oral)   Resp 16   Wt 73.9 kg (163 lb)   SpO2 97%   BMI 27.49 kg/m    Jayy Higgins RN on 6/20/2024 at 1:16 PM    
[id number]

## 2024-09-23 NOTE — PROVIDER CONTACT NOTE (CRITICAL VALUE NOTIFICATION) - NS PROVIDER READ BACK TO LAB
[Sprain/Strain] : sprain/strain yes [Was the competent medical cause of the injury] : was the competent medical cause of the injury [Are consistent with the injury] : are consistent with the injury [Consistent with my objective findings] : consistent with my objective findings [Total (100%)] : total (100%) [Does not reveal pre-existing condition(s) that may affect treatment/prognosis] : does not reveal pre-existing condition(s) that may affect treatment/prognosis [Patient] : patient [No Rx restrictions] : No Rx restrictions. [I provided the services listed above] :  I provided the services listed above.

## 2024-12-02 NOTE — ED ADULT NURSE NOTE - WHEN FALL OCCURRED
unable to determine I have personally provided the amount of critical care time documented below concurrently with the resident/fellow.  This time excludes time spent on separate procedures and time spent teaching. I have reviewed the resident’s / fellow’s documentation and I agree with the history, exam, and assessment and plan of care.

## 2025-04-25 NOTE — ED PROVIDER NOTE - CROS ED ROS STATEMENT
Biometrics completed.    Results reviewed with a Registered Nurse; understanding of results and   educational materials was verbalized.  
all other ROS negative except as per HPI